# Patient Record
Sex: FEMALE | Race: WHITE | NOT HISPANIC OR LATINO | Employment: OTHER | URBAN - NONMETROPOLITAN AREA
[De-identification: names, ages, dates, MRNs, and addresses within clinical notes are randomized per-mention and may not be internally consistent; named-entity substitution may affect disease eponyms.]

---

## 2017-02-09 DIAGNOSIS — R11.0 NAUSEA: ICD-10-CM

## 2017-02-09 RX ORDER — PROMETHAZINE HYDROCHLORIDE 25 MG/1
25 TABLET ORAL EVERY 6 HOURS PRN
Qty: 30 TABLET | Refills: 1 | Status: CANCELLED | OUTPATIENT
Start: 2017-02-09

## 2017-02-09 RX ORDER — HYDROCODONE BITARTRATE AND ACETAMINOPHEN 7.5; 325 MG/1; MG/1
1 TABLET ORAL EVERY 6 HOURS PRN
Qty: 100 TABLET | Refills: 0 | Status: CANCELLED | OUTPATIENT
Start: 2017-02-09

## 2017-02-09 RX ORDER — PROMETHAZINE HYDROCHLORIDE 25 MG/1
25 TABLET ORAL EVERY 6 HOURS PRN
Qty: 30 TABLET | Refills: 1 | Status: SHIPPED | OUTPATIENT
Start: 2017-02-09 | End: 2017-02-21 | Stop reason: SDUPTHER

## 2017-02-21 ENCOUNTER — OFFICE VISIT (OUTPATIENT)
Dept: FAMILY MEDICINE CLINIC | Facility: CLINIC | Age: 57
End: 2017-02-21

## 2017-02-21 VITALS
SYSTOLIC BLOOD PRESSURE: 152 MMHG | HEIGHT: 60 IN | BODY MASS INDEX: 50.06 KG/M2 | WEIGHT: 255 LBS | DIASTOLIC BLOOD PRESSURE: 82 MMHG

## 2017-02-21 DIAGNOSIS — R11.0 NAUSEA: ICD-10-CM

## 2017-02-21 DIAGNOSIS — M19.072 ARTHRITIS OF LEFT ANKLE: Primary | ICD-10-CM

## 2017-02-21 PROCEDURE — 99214 OFFICE O/P EST MOD 30 MIN: CPT | Performed by: FAMILY MEDICINE

## 2017-02-21 RX ORDER — HYDROCODONE BITARTRATE AND ACETAMINOPHEN 7.5; 325 MG/1; MG/1
1 TABLET ORAL EVERY 6 HOURS PRN
Qty: 100 TABLET | Refills: 0 | Status: SHIPPED | OUTPATIENT
Start: 2017-02-21 | End: 2017-02-21 | Stop reason: SDUPTHER

## 2017-02-21 RX ORDER — HYDROCODONE BITARTRATE AND ACETAMINOPHEN 7.5; 325 MG/1; MG/1
1 TABLET ORAL EVERY 6 HOURS PRN
Qty: 100 TABLET | Refills: 0 | Status: SHIPPED | OUTPATIENT
Start: 2017-02-21 | End: 2017-04-12 | Stop reason: SDUPTHER

## 2017-02-21 RX ORDER — PROMETHAZINE HYDROCHLORIDE 25 MG/1
25 TABLET ORAL EVERY 6 HOURS PRN
Qty: 30 TABLET | Refills: 1 | Status: SHIPPED | OUTPATIENT
Start: 2017-02-21 | End: 2017-04-12 | Stop reason: SDUPTHER

## 2017-02-21 NOTE — PROGRESS NOTES
Subjective   Prema Curtis is a 57 y.o. female.  Patient is slightly mentally impaired and works at the [employer].  She's been having increasing times with pain in the joints including shoulders back and knees.  Most recently she is having increasing pain and stiffness in the left ankle which is maybe difficult for her to stand and walk.    History of Present Illness   Arthritis   Presents for follow-up visit. She complains of pain, stiffness and joint swelling. She reports no joint warmth. The symptoms have been worsening. Affected location knees, left ankle, shoulders. Her pain is at a severity of 8/10. Associated symptoms include fatigue, pain at night and pain while resting. Pertinent negatives include no diarrhea, dry eyes, dry mouth, dysuria, fever, rash, Raynaud's syndrome, uveitis or weight loss. Compliance with total regimen is %. Compliance with medications is %.       The following portions of the patient's history were reviewed and updated as appropriate: allergies, current medications, past family history, past medical history, past social history, past surgical history and problem list.    Review of Systems   Constitutional: Negative.    HENT: Negative.    Respiratory: Negative.  Negative for shortness of breath.    Cardiovascular: Negative.  Negative for chest pain.   Gastrointestinal: Negative.    Musculoskeletal: Positive for arthralgias, back pain, gait problem, joint swelling, myalgias and neck pain.   Skin: Negative.    Allergic/Immunologic: Negative for immunocompromised state.   Neurological: Negative for dizziness, tremors, seizures, syncope, weakness and numbness.   Hematological: Negative.    Psychiatric/Behavioral: Negative for agitation, confusion, dysphoric mood and sleep disturbance. The patient is nervous/anxious.        Objective   Physical Exam   Constitutional: She is oriented to person, place, and time. She appears well-developed and well-nourished.    HENT:   Head: Normocephalic and atraumatic.   Nose: Nose normal.   Mouth/Throat: Oropharynx is clear and moist.   Eyes: Conjunctivae and EOM are normal. Pupils are equal, round, and reactive to light.   Neck: Normal range of motion. Neck supple. No JVD present. No tracheal deviation present. No thyromegaly present.   Cardiovascular: Normal rate, regular rhythm, normal heart sounds and intact distal pulses.    No murmur heard.  Pulmonary/Chest: Effort normal and breath sounds normal. She has no wheezes.   Abdominal: Soft. Bowel sounds are normal. She exhibits no distension. There is no tenderness.   Musculoskeletal: She exhibits tenderness. She exhibits no edema.   Swelling and tenderness over the left ankle lateral malleolus.  Tenderness with crepitus noted in both knees anteriorly.  Low back tenderness, bilateral shoulder joint tenderness and decreased range of motion   Lymphadenopathy:     She has no cervical adenopathy.   Neurological: She is alert and oriented to person, place, and time. She displays normal reflexes. Coordination abnormal.   Skin: Skin is warm and dry. No rash noted.   Psychiatric: She has a normal mood and affect.   Nursing note and vitals reviewed.      Assessment/Plan   Prema was seen today for ankle pain.    Diagnoses and all orders for this visit:    Arthritis of left ankle    Nausea  -     promethazine (PHENERGAN) 25 MG tablet; Take 1 tablet by mouth Every 6 (Six) Hours As Needed for nausea.  -     XR Ankle 3+ View Left    Other orders  -     Discontinue: HYDROcodone-acetaminophen (NORCO) 7.5-325 MG per tablet; Take 1 tablet by mouth Every 6 (Six) Hours As Needed for moderate pain (4-6).  -     HYDROcodone-acetaminophen (NORCO) 7.5-325 MG per tablet; Take 1 tablet by mouth Every 6 (Six) Hours As Needed for moderate pain (4-6).    The patient has read and signed the [facility] Controlled Substance Contract.  I will continue to see patient for regular follow up appointments. Patient is  well controlled on the medication.  SHAYLA has been reviewed by me and is updated every 3 months. The patient is aware of the potential for addiction and dependence.  X-ray shows arthritis, bone spur heel spur.  Discussed with the patient today whether or not she may be able to continue working.  I've recommended that she consider a job where she can set if she has to continue working as a think the continuing arthritis pain will progress with exertion.    Continue with hydrocodone when needed for pain.  Prescribed an AFO for the left ankle, consider physical therapy if not improving

## 2017-03-21 ENCOUNTER — OFFICE VISIT (OUTPATIENT)
Dept: FAMILY MEDICINE CLINIC | Facility: CLINIC | Age: 57
End: 2017-03-21

## 2017-03-21 VITALS
WEIGHT: 250.2 LBS | DIASTOLIC BLOOD PRESSURE: 82 MMHG | HEIGHT: 60 IN | BODY MASS INDEX: 49.12 KG/M2 | SYSTOLIC BLOOD PRESSURE: 140 MMHG

## 2017-03-21 DIAGNOSIS — E55.9 VITAMIN D DEFICIENCY: ICD-10-CM

## 2017-03-21 DIAGNOSIS — I10 ESSENTIAL HYPERTENSION: Primary | ICD-10-CM

## 2017-03-21 DIAGNOSIS — M25.561 CHRONIC PAIN OF RIGHT KNEE: ICD-10-CM

## 2017-03-21 DIAGNOSIS — G89.29 CHRONIC PAIN OF RIGHT KNEE: ICD-10-CM

## 2017-03-21 DIAGNOSIS — E66.01 MORBID OBESITY, UNSPECIFIED OBESITY TYPE (HCC): ICD-10-CM

## 2017-03-21 PROCEDURE — 99214 OFFICE O/P EST MOD 30 MIN: CPT | Performed by: FAMILY MEDICINE

## 2017-03-21 NOTE — PROGRESS NOTES
Subjective   Prema Curtis is a 57 y.o. female.  She's here today to follow-up on weight issues as well as arthritis pretty having increasing pain in the right knee both at rest and with movement or walking.  She has been able to set more at work which is septic the pressure off her back in the office helped a bit    She is very proud that she's lost 5 pounds.  We discussed diet and exercise when tolerated.  Hopefully with weight loss she'll be able to tolerate more exercise    History of Present Illness   Obesity   This is a chronic problem. The current episode started more than 1 year ago. The problem occurs constantly. The problem has been gradually worsening. Associated symptoms include arthralgias and fatigue. Pertinent negatives include no abdominal pain, anorexia, change in bowel habit, chest pain, chills, congestion, coughing, diaphoresis, fever, headaches, joint swelling, myalgias, nausea, neck pain, numbness, rash, sore throat, swollen glands, urinary symptoms, vertigo, visual change, vomiting or weakness. The symptoms are aggravated by eating. Treatments tried: diet, exercise. The treatment provided mild relief.     The following portions of the patient's history were reviewed and updated as appropriate: allergies, current medications, past family history, past medical history, past social history, past surgical history and problem list.    Review of Systems   Constitutional: Negative.    HENT: Negative.    Respiratory: Negative.  Negative for shortness of breath.    Cardiovascular: Negative.  Negative for chest pain.   Gastrointestinal: Negative.    Musculoskeletal: Negative.  Negative for myalgias.   Skin: Negative.    Allergic/Immunologic: Negative for immunocompromised state.   Neurological: Negative for dizziness, tremors, seizures, syncope, weakness and numbness.   Hematological: Negative.    Psychiatric/Behavioral: Negative for agitation, confusion, dysphoric mood and sleep disturbance. The  patient is not nervous/anxious.        Objective   Physical Exam   Constitutional: She is oriented to person, place, and time. She appears well-developed and well-nourished.   HENT:   Head: Normocephalic and atraumatic.   Nose: Nose normal.   Mouth/Throat: Oropharynx is clear and moist.   Eyes: Conjunctivae and EOM are normal. Pupils are equal, round, and reactive to light.   Neck: Normal range of motion. Neck supple. No JVD present. No tracheal deviation present. No thyromegaly present.   Cardiovascular: Normal rate, regular rhythm, normal heart sounds and intact distal pulses.    No murmur heard.  Pulmonary/Chest: Effort normal and breath sounds normal. She has no wheezes.   Abdominal: Soft. Bowel sounds are normal. She exhibits no distension. There is no tenderness.   Musculoskeletal: She exhibits tenderness. She exhibits no edema.   Pain and stiffness in the right knee especially in the anterior lateral area   Lymphadenopathy:     She has no cervical adenopathy.   Neurological: She is alert and oriented to person, place, and time. Coordination normal.   Skin: Skin is warm and dry. No rash noted.   Psychiatric: She has a normal mood and affect.   Nursing note and vitals reviewed.      Assessment/Plan   Prema was seen today for follow-up.    Diagnoses and all orders for this visit:    Essential hypertension  -     CBC & Differential  -     Comprehensive Metabolic Panel  -     Lipid Panel  -     Vitamin B12 & Folate  -     T4, Free  -     TSH    Vitamin D deficiency  -     Vitamin D 25 Hydroxy    Chronic pain of right knee  -     XR Knee 3 View Right    Morbid obesity, unspecified obesity type     she is due for some labs and certainly will assess thyroid due to the difficulty she is having with weight loss    Recheck vitamin D as she has a history of deficiency    We'll x-ray the right knee and follow-up accordingly.    Continue with diet and exercise changes and she is commended for her weight loss

## 2017-03-22 LAB
ALBUMIN SERPL-MCNC: 3.9 G/DL (ref 3.2–5.5)
ALBUMIN/GLOB SERPL: 1.1 G/DL (ref 1–3)
ALP SERPL-CCNC: 74 U/L (ref 15–121)
ALT SERPL W P-5'-P-CCNC: 26 U/L (ref 10–60)
ANION GAP SERPL CALCULATED.3IONS-SCNC: 10 MMOL/L (ref 5–15)
AST SERPL-CCNC: 27 U/L (ref 10–60)
BASOPHILS # BLD AUTO: 0.03 10*3/MM3 (ref 0–0.2)
BASOPHILS NFR BLD AUTO: 0.4 % (ref 0–2)
BILIRUB SERPL-MCNC: 0.7 MG/DL (ref 0.2–1)
BUN BLD-MCNC: 14 MG/DL (ref 8–25)
BUN/CREAT SERPL: 20 (ref 7–25)
CALCIUM SPEC-SCNC: 9.4 MG/DL (ref 8.4–10.8)
CHLORIDE SERPL-SCNC: 102 MMOL/L (ref 100–112)
CHOLEST SERPL-MCNC: 234 MG/DL (ref 150–200)
CO2 SERPL-SCNC: 25 MMOL/L (ref 20–32)
CREAT BLD-MCNC: 0.7 MG/DL (ref 0.4–1.3)
DEPRECATED RDW RBC AUTO: 43.3 FL (ref 36.4–46.3)
EOSINOPHIL # BLD AUTO: 0.28 10*3/MM3 (ref 0–0.7)
EOSINOPHIL NFR BLD AUTO: 3.9 % (ref 0–7)
ERYTHROCYTE [DISTWIDTH] IN BLOOD BY AUTOMATED COUNT: 13.6 % (ref 11.5–14.5)
GFR SERPL CREATININE-BSD FRML MDRD: 86 ML/MIN/1.73 (ref 51–120)
GLOBULIN UR ELPH-MCNC: 3.6 GM/DL (ref 2.5–4.6)
GLUCOSE BLD-MCNC: 161 MG/DL (ref 70–100)
HCT VFR BLD AUTO: 42.1 % (ref 35–45)
HDLC SERPL-MCNC: 44 MG/DL (ref 35–100)
HGB BLD-MCNC: 13.6 G/DL (ref 12–15.5)
LDLC SERPL CALC-MCNC: 155 MG/DL
LDLC/HDLC SERPL: 3.52 {RATIO}
LYMPHOCYTES # BLD AUTO: 1.96 10*3/MM3 (ref 0.6–4.2)
LYMPHOCYTES NFR BLD AUTO: 27.2 % (ref 10–50)
MCH RBC QN AUTO: 28.5 PG (ref 26.5–34)
MCHC RBC AUTO-ENTMCNC: 32.3 G/DL (ref 31.4–36)
MCV RBC AUTO: 88.1 FL (ref 80–98)
MONOCYTES # BLD AUTO: 0.68 10*3/MM3 (ref 0–0.9)
MONOCYTES NFR BLD AUTO: 9.4 % (ref 0–12)
NEUTROPHILS # BLD AUTO: 4.25 10*3/MM3 (ref 2–8.6)
NEUTROPHILS NFR BLD AUTO: 59.1 % (ref 37–80)
PLATELET # BLD AUTO: 330 10*3/MM3 (ref 150–450)
PMV BLD AUTO: 10.5 FL (ref 8–12)
POTASSIUM BLD-SCNC: 3.9 MMOL/L (ref 3.4–5.4)
PROT SERPL-MCNC: 7.5 G/DL (ref 6.7–8.2)
RBC # BLD AUTO: 4.78 10*6/MM3 (ref 3.77–5.16)
SODIUM BLD-SCNC: 137 MMOL/L (ref 134–146)
TRIGL SERPL-MCNC: 176 MG/DL (ref 35–160)
VLDLC SERPL-MCNC: 35.2 MG/DL
WBC NRBC COR # BLD: 7.2 10*3/MM3 (ref 3.2–9.8)

## 2017-03-22 PROCEDURE — 80061 LIPID PANEL: CPT | Performed by: FAMILY MEDICINE

## 2017-03-22 PROCEDURE — 82306 VITAMIN D 25 HYDROXY: CPT | Performed by: FAMILY MEDICINE

## 2017-03-22 PROCEDURE — 84443 ASSAY THYROID STIM HORMONE: CPT | Performed by: FAMILY MEDICINE

## 2017-03-22 PROCEDURE — 82746 ASSAY OF FOLIC ACID SERUM: CPT | Performed by: FAMILY MEDICINE

## 2017-03-22 PROCEDURE — 36415 COLL VENOUS BLD VENIPUNCTURE: CPT | Performed by: FAMILY MEDICINE

## 2017-03-22 PROCEDURE — 85025 COMPLETE CBC W/AUTO DIFF WBC: CPT | Performed by: FAMILY MEDICINE

## 2017-03-22 PROCEDURE — 84439 ASSAY OF FREE THYROXINE: CPT | Performed by: FAMILY MEDICINE

## 2017-03-22 PROCEDURE — 80053 COMPREHEN METABOLIC PANEL: CPT | Performed by: FAMILY MEDICINE

## 2017-03-22 PROCEDURE — 82607 VITAMIN B-12: CPT | Performed by: FAMILY MEDICINE

## 2017-03-22 NOTE — PROGRESS NOTES
Please call the patient regarding her abnormal result.  Tell her her cholesterol was high.  We can send her information about low cholesterol diet if she would like.  Her blood sugars also a bit high and we need to talk about changing her medications.  She supposed to come in in June but I'd like to see her in one month to check back about her sugar

## 2017-03-23 DIAGNOSIS — E03.9 HYPOTHYROIDISM, UNSPECIFIED TYPE: Primary | ICD-10-CM

## 2017-03-23 LAB
25(OH)D3 SERPL-MCNC: <12.8 NG/ML (ref 30–100)
FOLATE SERPL-MCNC: 5.84 NG/ML (ref 2.76–21)
T4 FREE SERPL-MCNC: 1.13 NG/DL (ref 0.78–2.19)
TSH SERPL DL<=0.05 MIU/L-ACNC: 5.15 MIU/ML (ref 0.46–4.68)
VIT B12 BLD-MCNC: 314 PG/ML (ref 239–931)

## 2017-03-23 RX ORDER — ERGOCALCIFEROL 1.25 MG/1
50000 CAPSULE ORAL 2 TIMES WEEKLY
Qty: 8 CAPSULE | Refills: 5 | Status: SHIPPED | OUTPATIENT
Start: 2017-03-23 | End: 2017-04-12 | Stop reason: SDUPTHER

## 2017-03-23 RX ORDER — LEVOTHYROXINE SODIUM 0.03 MG/1
25 TABLET ORAL DAILY
Qty: 30 TABLET | Refills: 5 | Status: SHIPPED | OUTPATIENT
Start: 2017-03-23 | End: 2019-06-20

## 2017-03-23 NOTE — PROGRESS NOTES
Please call the patient regarding her abnormal result.  Needs vitamin D 50,000 units twice weekly, send in for a year and the other labs are still pending

## 2017-03-23 NOTE — PROGRESS NOTES
Please call the patient regarding her abnormal result.  Her thyroid is underactive.  Send in synthroid 25 mcg daily.  Tell her this will give her energy, may help lose weight.  Recheck a TSH in 6 weeks.

## 2017-04-12 ENCOUNTER — OFFICE VISIT (OUTPATIENT)
Dept: FAMILY MEDICINE CLINIC | Facility: CLINIC | Age: 57
End: 2017-04-12

## 2017-04-12 VITALS
DIASTOLIC BLOOD PRESSURE: 78 MMHG | BODY MASS INDEX: 47.71 KG/M2 | HEIGHT: 60 IN | WEIGHT: 243 LBS | SYSTOLIC BLOOD PRESSURE: 134 MMHG

## 2017-04-12 DIAGNOSIS — R73.9 HYPERGLYCEMIA: ICD-10-CM

## 2017-04-12 DIAGNOSIS — E78.5 HYPERLIPIDEMIA, UNSPECIFIED HYPERLIPIDEMIA TYPE: ICD-10-CM

## 2017-04-12 DIAGNOSIS — R11.0 NAUSEA: ICD-10-CM

## 2017-04-12 DIAGNOSIS — I10 ESSENTIAL HYPERTENSION: ICD-10-CM

## 2017-04-12 DIAGNOSIS — E55.9 VITAMIN D DEFICIENCY: Primary | ICD-10-CM

## 2017-04-12 PROCEDURE — 99214 OFFICE O/P EST MOD 30 MIN: CPT | Performed by: FAMILY MEDICINE

## 2017-04-12 RX ORDER — HYDROCODONE BITARTRATE AND ACETAMINOPHEN 7.5; 325 MG/1; MG/1
1 TABLET ORAL EVERY 6 HOURS PRN
Qty: 100 TABLET | Refills: 0 | Status: SHIPPED | OUTPATIENT
Start: 2017-04-12 | End: 2017-06-20 | Stop reason: SDUPTHER

## 2017-04-12 RX ORDER — ERGOCALCIFEROL 1.25 MG/1
50000 CAPSULE ORAL 2 TIMES WEEKLY
Qty: 8 CAPSULE | Refills: 5 | Status: SHIPPED | OUTPATIENT
Start: 2017-04-13 | End: 2019-06-20

## 2017-04-12 RX ORDER — PROMETHAZINE HYDROCHLORIDE 25 MG/1
25 TABLET ORAL EVERY 6 HOURS PRN
Qty: 30 TABLET | Refills: 5 | Status: SHIPPED | OUTPATIENT
Start: 2017-04-12 | End: 2017-06-20 | Stop reason: SDUPTHER

## 2017-04-12 NOTE — PROGRESS NOTES
Subjective   Prema Curtis is a 57 y.o. female.  She had recent labs and his here today to review them.  Has a history of hypothyroidism, hypertension.    History of Present Illness     The following portions of the patient's history were reviewed and updated as appropriate: allergies, current medications, past family history, past medical history, past social history, past surgical history and problem list.    Review of Systems   Constitutional: Positive for fatigue.   HENT: Negative.    Respiratory: Negative.  Negative for shortness of breath.    Cardiovascular: Negative.  Negative for chest pain.   Gastrointestinal: Negative.    Musculoskeletal: Positive for arthralgias, gait problem, joint swelling and myalgias.   Skin: Negative.    Allergic/Immunologic: Negative for immunocompromised state.   Neurological: Negative for dizziness, tremors, seizures, syncope, weakness and numbness.   Hematological: Negative.    Psychiatric/Behavioral: Negative for agitation, confusion, dysphoric mood and sleep disturbance. The patient is nervous/anxious.        Objective   Physical Exam   Constitutional: She is oriented to person, place, and time. She appears well-developed and well-nourished.   HENT:   Head: Normocephalic and atraumatic.   Nose: Nose normal.   Mouth/Throat: Oropharynx is clear and moist.   Eyes: Conjunctivae and EOM are normal. Pupils are equal, round, and reactive to light.   Neck: Normal range of motion. Neck supple. No JVD present. No tracheal deviation present. No thyromegaly present.   Cardiovascular: Normal rate, regular rhythm, normal heart sounds and intact distal pulses.    No murmur heard.  Pulmonary/Chest: Effort normal and breath sounds normal. She has no wheezes.   Abdominal: Soft. Bowel sounds are normal. She exhibits no distension. There is no tenderness.   Musculoskeletal: She exhibits tenderness. She exhibits no edema.   Pain and stiffness in the right knee especially in the anterior  lateral area   Lymphadenopathy:     She has no cervical adenopathy.   Neurological: She is alert and oriented to person, place, and time. Coordination normal.   Skin: Skin is warm and dry. No rash noted.   Psychiatric: She has a normal mood and affect.   Nursing note and vitals reviewed.    Office Visit on 03/21/2017   Component Date Value Ref Range Status   • Glucose 03/22/2017 161* 70 - 100 mg/dL Final   • BUN 03/22/2017 14  8 - 25 mg/dL Final   • Creatinine 03/22/2017 0.70  0.40 - 1.30 mg/dL Final   • Sodium 03/22/2017 137  134 - 146 mmol/L Final   • Potassium 03/22/2017 3.9  3.4 - 5.4 mmol/L Final   • Chloride 03/22/2017 102  100 - 112 mmol/L Final   • CO2 03/22/2017 25.0  20.0 - 32.0 mmol/L Final   • Calcium 03/22/2017 9.4  8.4 - 10.8 mg/dL Final   • Total Protein 03/22/2017 7.5  6.7 - 8.2 g/dL Final   • Albumin 03/22/2017 3.90  3.20 - 5.50 g/dL Final   • ALT (SGPT) 03/22/2017 26  10 - 60 U/L Final   • AST (SGOT) 03/22/2017 27  10 - 60 U/L Final   • Alkaline Phosphatase 03/22/2017 74  15 - 121 U/L Final   • Total Bilirubin 03/22/2017 0.7  0.2 - 1.0 mg/dL Final   • eGFR Non African Amer 03/22/2017 86  51 - 120 mL/min/1.73 Final   • Globulin 03/22/2017 3.6  2.5 - 4.6 gm/dL Final   • A/G Ratio 03/22/2017 1.1  1.0 - 3.0 g/dL Final   • BUN/Creatinine Ratio 03/22/2017 20.0  7.0 - 25.0 Final   • Anion Gap 03/22/2017 10.0  5.0 - 15.0 mmol/L Final   • Total Cholesterol 03/22/2017 234* 150 - 200 mg/dL Final   • Triglycerides 03/22/2017 176* 35 - 160 mg/dL Final   • HDL Cholesterol 03/22/2017 44  35 - 100 mg/dL Final   • LDL Cholesterol  03/22/2017 155  mg/dL Final   • VLDL Cholesterol 03/22/2017 35.2  mg/dL Final   • LDL/HDL Ratio 03/22/2017 3.52   Final   • Folate 03/22/2017 5.84  2.76 - 21.00 ng/mL Final   • Vitamin B-12 03/22/2017 314  239 - 931 pg/mL Final   • 25 Hydroxy, Vitamin D 03/22/2017 <12.8* 30.0 - 100.0 ng/ml Final   • Free T4 03/22/2017 1.13  0.78 - 2.19 ng/dL Final   • TSH 03/22/2017 5.150* 0.460 - 4.680  mIU/mL Final   • WBC 03/22/2017 7.20  3.20 - 9.80 10*3/mm3 Final   • RBC 03/22/2017 4.78  3.77 - 5.16 10*6/mm3 Final   • Hemoglobin 03/22/2017 13.6  12.0 - 15.5 g/dL Final   • Hematocrit 03/22/2017 42.1  35.0 - 45.0 % Final   • MCV 03/22/2017 88.1  80.0 - 98.0 fL Final   • MCH 03/22/2017 28.5  26.5 - 34.0 pg Final   • MCHC 03/22/2017 32.3  31.4 - 36.0 g/dL Final   • RDW 03/22/2017 13.6  11.5 - 14.5 % Final   • RDW-SD 03/22/2017 43.3  36.4 - 46.3 fl Final   • MPV 03/22/2017 10.5  8.0 - 12.0 fL Final   • Platelets 03/22/2017 330  150 - 450 10*3/mm3 Final   • Neutrophil % 03/22/2017 59.1  37.0 - 80.0 % Final   • Lymphocyte % 03/22/2017 27.2  10.0 - 50.0 % Final   • Monocyte % 03/22/2017 9.4  0.0 - 12.0 % Final   • Eosinophil % 03/22/2017 3.9  0.0 - 7.0 % Final   • Basophil % 03/22/2017 0.4  0.0 - 2.0 % Final   • Neutrophils, Absolute 03/22/2017 4.25  2.00 - 8.60 10*3/mm3 Final   • Lymphocytes, Absolute 03/22/2017 1.96  0.60 - 4.20 10*3/mm3 Final   • Monocytes, Absolute 03/22/2017 0.68  0.00 - 0.90 10*3/mm3 Final   • Eosinophils, Absolute 03/22/2017 0.28  0.00 - 0.70 10*3/mm3 Final   • Basophils, Absolute 03/22/2017 0.03  0.00 - 0.20 10*3/mm3 Final   ]    Assessment/Plan   Prema was seen today for labs only.    Diagnoses and all orders for this visit:    Vitamin D deficiency    Nausea  -     promethazine (PHENERGAN) 25 MG tablet; Take 1 tablet by mouth Every 6 (Six) Hours As Needed for Nausea.    Essential hypertension    Hyperlipidemia, unspecified hyperlipidemia type    Hyperglycemia    Other orders  -     HYDROcodone-acetaminophen (NORCO) 7.5-325 MG per tablet; Take 1 tablet by mouth Every 6 (Six) Hours As Needed for Moderate Pain (4-6).  -     vitamin D (ERGOCALCIFEROL) 05956 UNITS capsule capsule; Take 1 capsule by mouth 2 (Two) Times a Week.     will add vitamin D supplementation due to deficiency    Will have her check blood sugars and follow up in 3 months    Continue with current dose of Synthroid    Diet and  lifestyle changes encouraged for lipids, recheck in 6 months, consider statin therapy if not improving.

## 2017-04-18 RX ORDER — DEXTROMETHORPHAN HYDROBROMIDE AND PROMETHAZINE HYDROCHLORIDE 15; 6.25 MG/5ML; MG/5ML
5 SYRUP ORAL 4 TIMES DAILY PRN
Qty: 473 ML | Refills: 0 | Status: SHIPPED | OUTPATIENT
Start: 2017-04-18 | End: 2018-12-20

## 2017-06-08 DIAGNOSIS — Z12.31 ENCOUNTER FOR SCREENING MAMMOGRAM FOR MALIGNANT NEOPLASM OF BREAST: Primary | ICD-10-CM

## 2017-06-20 ENCOUNTER — OFFICE VISIT (OUTPATIENT)
Dept: FAMILY MEDICINE CLINIC | Facility: CLINIC | Age: 57
End: 2017-06-20

## 2017-06-20 VITALS
WEIGHT: 236 LBS | DIASTOLIC BLOOD PRESSURE: 78 MMHG | HEIGHT: 60 IN | SYSTOLIC BLOOD PRESSURE: 124 MMHG | BODY MASS INDEX: 46.33 KG/M2

## 2017-06-20 DIAGNOSIS — M25.50 MULTIPLE JOINT PAIN: Primary | ICD-10-CM

## 2017-06-20 DIAGNOSIS — R11.0 NAUSEA: ICD-10-CM

## 2017-06-20 PROCEDURE — 99213 OFFICE O/P EST LOW 20 MIN: CPT | Performed by: FAMILY MEDICINE

## 2017-06-20 RX ORDER — HYDROCODONE BITARTRATE AND ACETAMINOPHEN 7.5; 325 MG/1; MG/1
1 TABLET ORAL EVERY 6 HOURS PRN
Qty: 100 TABLET | Refills: 0 | Status: SHIPPED | OUTPATIENT
Start: 2017-06-20 | End: 2017-09-20 | Stop reason: SDUPTHER

## 2017-06-20 RX ORDER — PROMETHAZINE HYDROCHLORIDE 25 MG/1
25 TABLET ORAL EVERY 6 HOURS PRN
Qty: 30 TABLET | Refills: 5 | Status: SHIPPED | OUTPATIENT
Start: 2017-06-20 | End: 2017-12-20 | Stop reason: SDUPTHER

## 2017-06-20 NOTE — PROGRESS NOTES
Subjective   Prema Curtis is a 57 y.o. female.  She's here today to follow-up on chronic pain issues including back pain shoulder pain and knee pain.  Her job physically taxing on her and she finds it difficult to stand all day due to the pain.  She needs a refill of her pain medications.  Also, she has lost a significant amount of weight, up almost 20 pounds.  She's been doing this just with diet and exercise.    Back Pain   This is a new problem. The current episode started more than 1 month ago. The problem occurs constantly. The problem is unchanged. The pain is present in the gluteal and lumbar spine. The quality of the pain is described as aching, shooting, stabbing and burning. The pain radiates to the right knee and left knee. The pain is at a severity of 8/10. The pain is severe. The pain is worse during the day. The symptoms are aggravated by position, bending, standing and twisting. Stiffness is present at night, in the morning and all day. Pertinent negatives include no chest pain, numbness or weakness. Risk factors include obesity, lack of exercise, poor posture and sedentary lifestyle. She has tried analgesics and NSAIDs for the symptoms. The treatment provided mild relief.        The following portions of the patient's history were reviewed and updated as appropriate: allergies, current medications, past family history, past medical history, past social history, past surgical history and problem list.    Review of Systems   Constitutional: Negative.    HENT: Negative.    Respiratory: Negative.  Negative for shortness of breath.    Cardiovascular: Negative.  Negative for chest pain.   Gastrointestinal: Negative.    Musculoskeletal: Positive for back pain. Negative for myalgias.   Skin: Negative.    Allergic/Immunologic: Negative for immunocompromised state.   Neurological: Negative for dizziness, tremors, seizures, syncope, weakness and numbness.   Hematological: Negative.     Psychiatric/Behavioral: Negative for agitation, confusion, dysphoric mood and sleep disturbance. The patient is not nervous/anxious.        Objective   Physical Exam   Constitutional: She is oriented to person, place, and time. She appears well-developed and well-nourished.   HENT:   Head: Normocephalic and atraumatic.   Nose: Nose normal.   Mouth/Throat: Oropharynx is clear and moist.   Eyes: Conjunctivae and EOM are normal. Pupils are equal, round, and reactive to light.   Neck: Normal range of motion. Neck supple. No JVD present. No tracheal deviation present. No thyromegaly present.   Cardiovascular: Normal rate, regular rhythm, normal heart sounds and intact distal pulses.    No murmur heard.  Pulmonary/Chest: Effort normal and breath sounds normal. She has no wheezes.   Abdominal: Soft. Bowel sounds are normal. She exhibits no distension. There is no tenderness.   Musculoskeletal: Normal range of motion. She exhibits no edema.   Significant crepitus right anterior knee under the scar    Tenderness in the low back and over the SI joint areas   Lymphadenopathy:     She has no cervical adenopathy.   Neurological: She is alert and oriented to person, place, and time. She displays normal reflexes. Coordination normal.   Skin: Skin is warm and dry. No rash noted.   Psychiatric: She has a normal mood and affect.   Nursing note and vitals reviewed.      Assessment/Plan   Prema was seen today for med refill.    Diagnoses and all orders for this visit:    Multiple joint pain    Nausea  -     promethazine (PHENERGAN) 25 MG tablet; Take 1 tablet by mouth Every 6 (Six) Hours As Needed for Nausea.    Other orders  -     HYDROcodone-acetaminophen (NORCO) 7.5-325 MG per tablet; Take 1 tablet by mouth Every 6 (Six) Hours As Needed for Moderate Pain (4-6).      Refilled hydrocodone to use as needed for the knee and back pain and will get an x-ray of the back and follow up accordingly.  The patient at this point is not willing  to get anything different onto the knee if it worsens she will let me know    Refilled Phenergan to use when needed.

## 2017-06-22 ENCOUNTER — PROCEDURE VISIT (OUTPATIENT)
Dept: FAMILY MEDICINE CLINIC | Facility: CLINIC | Age: 57
End: 2017-06-22

## 2017-06-22 VITALS
BODY MASS INDEX: 46.33 KG/M2 | WEIGHT: 236 LBS | DIASTOLIC BLOOD PRESSURE: 70 MMHG | HEIGHT: 60 IN | SYSTOLIC BLOOD PRESSURE: 122 MMHG

## 2017-06-22 DIAGNOSIS — Z01.419 WOMEN'S ANNUAL ROUTINE GYNECOLOGICAL EXAMINATION: ICD-10-CM

## 2017-06-22 DIAGNOSIS — Z01.419 PAP TEST, AS PART OF ROUTINE GYNECOLOGICAL EXAMINATION: Primary | ICD-10-CM

## 2017-06-22 PROCEDURE — G0101 CA SCREEN;PELVIC/BREAST EXAM: HCPCS | Performed by: FAMILY MEDICINE

## 2017-06-22 PROCEDURE — G0123 SCREEN CERV/VAG THIN LAYER: HCPCS | Performed by: PATHOLOGY

## 2017-06-22 NOTE — PROGRESS NOTES
"Subjective   Chief Complaint   Patient presents with   • Gynecologic Exam     Prema Curtis is a 57 y.o. year old No obstetric history on file. presenting to be seen for her annual exam.  She is  0 para 0    She is not sexually active.  In the past 12 months there has not been new sexual partners.     She exercises regularly: yes.  She wears her seat belt:yes.  She has concerns about domestic violence: no.  She is taking Vit D and Calcium:no      LMP:several years    The following portions of the patient's history were reviewed and updated as appropriate:problem list, current medications, allergies, past family history, past medical history, past social history and past surgical history.    Smoking status: Never Smoker                                                              Smokeless status: Never Used                        Review of Systems      Objective   /70  Ht 60\" (152.4 cm)  Wt 236 lb (107 kg)  BMI 46.09 kg/m2    General:  well developed; well nourished  no acute distress   Skin:  No suspicious lesions seen   Thyroid: normal to inspection and palpation   Breasts:  Examined in supine position  Symmetric without masses or skin dimpling  Nipples normal without inversion, lesions or discharge  There are no palpable axillary nodes   Abdomen: soft, non-tender; no masses  no umbilical or inginual hernias are present  no hepato-splenomegaly   Psych: alert,oriented, in NAD with a full range of affect, normal behavior and no psychotic features   Pelvis: Clinical staff was present for exam  External genitalia:  normal appearance of the external genitalia including Bartholin's and [place]'s glands.  :  urethral meatus normal; urethral hypermobility is absent.  Vaginal:  normal pink mucosa without prolapse or lesions.  Cervix:  normal appearance.  Uterus:  anteverted;  Adnexa:  non palpable bilaterally.   She has a mild bladder prolapse and urethral prolapse noted.  She is having no " symptoms.    Lab Review   No data reviewed    Imaging  No data reviewed       Assessment   1. Annual gynecologic exam     Plan   1. Patient reports that she has never been sexually active and has no plans to be.  If this Pap is normal she will not likely need another but will continue with pelvic exams.  She is scheduled for her mammogram next month    No orders of the defined types were placed in this encounter.         This note was electronically signed.    Olga Rojas MD  June 22, 2017

## 2017-06-29 LAB
LAB AP CASE REPORT: NORMAL
LAB AP GYN ADDITIONAL INFORMATION: NORMAL
LAB AP GYN OTHER FINDINGS: NORMAL
Lab: NORMAL
PATH INTERP SPEC-IMP: NORMAL
STAT OF ADQ CVX/VAG CYTO-IMP: NORMAL

## 2017-08-28 DIAGNOSIS — M25.561 CHRONIC PAIN OF RIGHT KNEE: Primary | ICD-10-CM

## 2017-08-28 DIAGNOSIS — G89.29 CHRONIC PAIN OF RIGHT KNEE: Primary | ICD-10-CM

## 2017-08-29 ENCOUNTER — OFFICE VISIT (OUTPATIENT)
Dept: ORTHOPEDIC SURGERY | Facility: CLINIC | Age: 57
End: 2017-08-29

## 2017-08-29 VITALS — WEIGHT: 239 LBS | HEIGHT: 60 IN | BODY MASS INDEX: 46.92 KG/M2

## 2017-08-29 DIAGNOSIS — Z96.651 PRESENCE OF RIGHT ARTIFICIAL KNEE JOINT: ICD-10-CM

## 2017-08-29 DIAGNOSIS — M25.561 ACUTE PAIN OF RIGHT KNEE: Primary | ICD-10-CM

## 2017-08-29 DIAGNOSIS — I10 ESSENTIAL HYPERTENSION: ICD-10-CM

## 2017-08-29 PROCEDURE — 99214 OFFICE O/P EST MOD 30 MIN: CPT | Performed by: ORTHOPAEDIC SURGERY

## 2017-09-20 ENCOUNTER — OFFICE VISIT (OUTPATIENT)
Dept: FAMILY MEDICINE CLINIC | Facility: CLINIC | Age: 57
End: 2017-09-20

## 2017-09-20 VITALS
BODY MASS INDEX: 46.13 KG/M2 | HEIGHT: 60 IN | WEIGHT: 235 LBS | SYSTOLIC BLOOD PRESSURE: 124 MMHG | TEMPERATURE: 98 F | HEART RATE: 84 BPM | DIASTOLIC BLOOD PRESSURE: 74 MMHG

## 2017-09-20 DIAGNOSIS — M25.561 CHRONIC PAIN OF RIGHT KNEE: ICD-10-CM

## 2017-09-20 DIAGNOSIS — G89.29 CHRONIC PAIN OF RIGHT KNEE: ICD-10-CM

## 2017-09-20 DIAGNOSIS — M54.50 BILATERAL LOW BACK PAIN WITHOUT SCIATICA, UNSPECIFIED CHRONICITY: Primary | ICD-10-CM

## 2017-09-20 PROCEDURE — 99213 OFFICE O/P EST LOW 20 MIN: CPT | Performed by: FAMILY MEDICINE

## 2017-09-20 RX ORDER — HYDROCODONE BITARTRATE AND ACETAMINOPHEN 7.5; 325 MG/1; MG/1
1 TABLET ORAL EVERY 6 HOURS PRN
Qty: 100 TABLET | Refills: 0 | Status: SHIPPED | OUTPATIENT
Start: 2017-09-20 | End: 2017-12-20 | Stop reason: SDUPTHER

## 2017-09-20 NOTE — PROGRESS NOTES
"Subjective   Prmea Curtis is a 57 y.o. female.  She's here today to follow-up on chronic pain issues, especially knee pain. She fell 2-3 weeks ago and had x-rays, which were negative.  She had a \"deep bruise\" and it is still painful to walk or stand for a long time.  Continues to have chronic back pain issues.    Back Pain   This is a new problem. The current episode started more than 1 month ago. The problem occurs constantly. The problem is unchanged. The pain is present in the gluteal and lumbar spine. The quality of the pain is described as aching, shooting, stabbing and burning. The pain radiates to the right knee and left knee. The pain is at a severity of 8/10. The pain is severe. The pain is worse during the day. The symptoms are aggravated by position, bending, standing and twisting. Stiffness is present at night, in the morning and all day. Pertinent negatives include no chest pain, numbness or weakness. Risk factors include obesity, lack of exercise, poor posture and sedentary lifestyle. She has tried analgesics and NSAIDs for the symptoms. The treatment provided mild relief.        The following portions of the patient's history were reviewed and updated as appropriate: allergies, current medications, past family history, past medical history, past social history, past surgical history and problem list.    Review of Systems   Constitutional: Negative.    HENT: Negative.    Respiratory: Negative.  Negative for shortness of breath.    Cardiovascular: Negative.  Negative for chest pain.   Gastrointestinal: Negative.    Musculoskeletal: Positive for back pain. Negative for myalgias.   Skin: Negative.    Allergic/Immunologic: Negative for immunocompromised state.   Neurological: Negative for dizziness, tremors, seizures, syncope, weakness and numbness.   Hematological: Negative.    Psychiatric/Behavioral: Negative for agitation, confusion, dysphoric mood and sleep disturbance. The patient is not " nervous/anxious.      Objective   Physical Exam   Constitutional: She is oriented to person, place, and time. She appears well-developed and well-nourished.   HENT:   Head: Normocephalic and atraumatic.   Nose: Nose normal.   Mouth/Throat: Oropharynx is clear and moist.   Eyes: Conjunctivae and EOM are normal. Pupils are equal, round, and reactive to light.   Neck: Normal range of motion. Neck supple. No JVD present. No tracheal deviation present. No thyromegaly present.   Cardiovascular: Normal rate, regular rhythm, normal heart sounds and intact distal pulses.    No murmur heard.  Pulmonary/Chest: Effort normal and breath sounds normal. She has no wheezes.   Abdominal: Soft. Bowel sounds are normal. She exhibits no distension. There is no tenderness.   Musculoskeletal: Normal range of motion. She exhibits no edema.   Significant crepitus right anterior knee under the scar    Tenderness in the low back and over the SI joint areas   Lymphadenopathy:     She has no cervical adenopathy.   Neurological: She is alert and oriented to person, place, and time. She displays normal reflexes. Coordination normal.   Skin: Skin is warm and dry. No rash noted.   Psychiatric: She has a normal mood and affect.   Nursing note and vitals reviewed.    Assessment/Plan   Prema was seen today for follow-up.    Diagnoses and all orders for this visit:    Bilateral low back pain without sciatica, unspecified chronicity    Chronic pain of right knee    Other orders  -     HYDROcodone-acetaminophen (NORCO) 7.5-325 MG per tablet; Take 1 tablet by mouth Every 6 (Six) Hours As Needed for Moderate Pain .      Refilled hydrocodone to use as needed for the knee and back pain.    The patient has read and signed the [facility] Controlled Substance Contract.  I will continue to see patient for regular follow up appointments. Patient is well controlled on the medication.  SHAYLA has been reviewed by me and is updated every 3 months. The patient  is aware of the potential for addiction and dependence.   Gave a noted her request for her to work no more than 3 days per week for the next month.

## 2017-12-20 ENCOUNTER — OFFICE VISIT (OUTPATIENT)
Dept: FAMILY MEDICINE CLINIC | Facility: CLINIC | Age: 57
End: 2017-12-20

## 2017-12-20 VITALS
BODY MASS INDEX: 46.13 KG/M2 | WEIGHT: 235 LBS | HEIGHT: 60 IN | DIASTOLIC BLOOD PRESSURE: 80 MMHG | SYSTOLIC BLOOD PRESSURE: 156 MMHG

## 2017-12-20 DIAGNOSIS — J30.89 CHRONIC NONSEASONAL ALLERGIC RHINITIS DUE TO OTHER ALLERGEN: ICD-10-CM

## 2017-12-20 DIAGNOSIS — I10 ESSENTIAL HYPERTENSION: ICD-10-CM

## 2017-12-20 DIAGNOSIS — M25.50 MULTIPLE JOINT PAIN: Primary | ICD-10-CM

## 2017-12-20 DIAGNOSIS — R11.0 NAUSEA: ICD-10-CM

## 2017-12-20 PROCEDURE — 99214 OFFICE O/P EST MOD 30 MIN: CPT | Performed by: FAMILY MEDICINE

## 2017-12-20 RX ORDER — PROMETHAZINE HYDROCHLORIDE 25 MG/1
25 TABLET ORAL EVERY 6 HOURS PRN
Qty: 30 TABLET | Refills: 5 | Status: SHIPPED | OUTPATIENT
Start: 2017-12-20 | End: 2018-03-14 | Stop reason: SDUPTHER

## 2017-12-20 RX ORDER — HYDROCODONE BITARTRATE AND ACETAMINOPHEN 7.5; 325 MG/1; MG/1
1 TABLET ORAL EVERY 6 HOURS PRN
Qty: 100 TABLET | Refills: 0 | Status: SHIPPED | OUTPATIENT
Start: 2017-12-20 | End: 2018-03-12 | Stop reason: SDUPTHER

## 2017-12-20 RX ORDER — MONTELUKAST SODIUM 10 MG/1
TABLET ORAL
Qty: 30 TABLET | Refills: 5 | Status: SHIPPED | OUTPATIENT
Start: 2017-12-20 | End: 2022-06-09

## 2017-12-20 NOTE — PROGRESS NOTES
Subjective   Prema Curtis is a 57 y.o. female.  She's here today to follow-up on chronic pain issues, especially knee pain.  She needs a refill of her pain medication.  She is taking it only when needed, as instructed.    She has a history of some allergic rhinitis symptoms.  She had specific allergy testing which was negative so she went off all of her allergy medicine and now is having rhinorrhea, sneezing and congestion.      Back Pain   This is a new problem. The current episode started more than 1 month ago. The problem occurs constantly. The problem is unchanged. The pain is present in the gluteal and lumbar spine. The quality of the pain is described as aching, shooting, stabbing and burning. The pain radiates to the right knee and left knee. The pain is at a severity of 8/10. The pain is severe. The pain is worse during the day. The symptoms are aggravated by position, bending, standing and twisting. Stiffness is present at night, in the morning and all day. Pertinent negatives include no chest pain, numbness or weakness. Risk factors include obesity, lack of exercise, poor posture and sedentary lifestyle. She has tried analgesics and NSAIDs for the symptoms. The treatment provided mild relief.      The following portions of the patient's history were reviewed and updated as appropriate: allergies, current medications, past family history, past medical history, past social history, past surgical history and problem list.    Review of Systems   Constitutional: Negative.    HENT: Positive for congestion, postnasal drip, rhinorrhea, sinus pain and sneezing.    Respiratory: Positive for cough. Negative for shortness of breath.    Cardiovascular: Negative.  Negative for chest pain.   Gastrointestinal: Negative.    Genitourinary: Positive for frequency.   Musculoskeletal: Positive for arthralgias, back pain, joint swelling and myalgias.   Skin: Negative.    Allergic/Immunologic: Negative for  immunocompromised state.   Neurological: Negative for dizziness, tremors, seizures, syncope, weakness and numbness.   Hematological: Negative.    Psychiatric/Behavioral: Negative for agitation, confusion, dysphoric mood and sleep disturbance. The patient is not nervous/anxious.      Objective   Physical Exam   Constitutional: She is oriented to person, place, and time. She appears well-developed and well-nourished.   HENT:   Head: Normocephalic and atraumatic.   Nose: Nose normal.   Mouth/Throat: Oropharynx is clear and moist.   Eyes: Conjunctivae and EOM are normal. Pupils are equal, round, and reactive to light.   Neck: Normal range of motion. Neck supple. No JVD present. No tracheal deviation present. No thyromegaly present.   Cardiovascular: Normal rate, regular rhythm, normal heart sounds and intact distal pulses.    No murmur heard.  Pulmonary/Chest: Effort normal and breath sounds normal. She has no wheezes.   Abdominal: Soft. Bowel sounds are normal. She exhibits no distension. There is no tenderness.   Musculoskeletal: Normal range of motion. She exhibits no edema.   Significant crepitus right anterior knee under the scar    Tenderness in the low back and over the SI joint areas   Lymphadenopathy:     She has no cervical adenopathy.   Neurological: She is alert and oriented to person, place, and time. She displays normal reflexes. Coordination normal.   Skin: Skin is warm and dry. No rash noted.   Psychiatric: She has a normal mood and affect.   Nursing note and vitals reviewed.    Assessment/Plan   Prema was seen today for med refill.    Diagnoses and all orders for this visit:    Multiple joint pain    Nausea  -     promethazine (PHENERGAN) 25 MG tablet; Take 1 tablet by mouth Every 6 (Six) Hours As Needed for Nausea.    Chronic nonseasonal allergic rhinitis due to other allergen    Essential hypertension    Other orders  -     HYDROcodone-acetaminophen (NORCO) 7.5-325 MG per tablet; Take 1 tablet by  mouth Every 6 (Six) Hours As Needed for Moderate Pain .  -     montelukast (SINGULAIR) 10 MG tablet; 1 tablet daily    Refilled hydrocodone to use as needed for the knee and back pain.    The patient has read and signed the [facility] Controlled Substance Contract.  I will continue to see patient for regular follow up appointments. Patient is well controlled on the medication.  SHAYLA has been reviewed by me and is updated every 3 months. The patient is aware of the potential for addiction and dependence.   Blood pressure is elevated today but she has been taking some over-the-counter cold medications.  She will monitor at home and contact me if not improving over the next week back to her baseline.  Will go back on Singulair for allergy symptoms as needed as it was effective for her symptoms before.  Contact me if not improving within 1-2 weeks back on the medicine.        This document has been electronically signed by Olga Rojas MD on December 20, 2017 1:26 PM

## 2018-01-17 ENCOUNTER — OFFICE VISIT (OUTPATIENT)
Dept: FAMILY MEDICINE CLINIC | Facility: CLINIC | Age: 58
End: 2018-01-17

## 2018-01-17 VITALS
SYSTOLIC BLOOD PRESSURE: 138 MMHG | BODY MASS INDEX: 46.13 KG/M2 | HEIGHT: 60 IN | WEIGHT: 235 LBS | DIASTOLIC BLOOD PRESSURE: 70 MMHG

## 2018-01-17 DIAGNOSIS — M25.512 ACUTE PAIN OF LEFT SHOULDER: ICD-10-CM
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
## 2018-01-17 DIAGNOSIS — S20.212S CONTUSION OF LEFT CHEST WALL, SEQUELA: ICD-10-CM

## 2018-01-17 PROCEDURE — 99214 OFFICE O/P EST MOD 30 MIN: CPT | Performed by: FAMILY MEDICINE
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
## 2018-02-12 ENCOUNTER — OFFICE VISIT (OUTPATIENT)
Dept: FAMILY MEDICINE CLINIC | Facility: CLINIC | Age: 58
End: 2018-02-12

## 2018-02-12 VITALS
WEIGHT: 255.4 LBS | SYSTOLIC BLOOD PRESSURE: 150 MMHG | DIASTOLIC BLOOD PRESSURE: 94 MMHG | BODY MASS INDEX: 59.11 KG/M2 | HEIGHT: 55 IN

## 2018-02-12 DIAGNOSIS — S49.92XD INJURY OF LEFT SHOULDER, SUBSEQUENT ENCOUNTER: Primary | ICD-10-CM
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
## 2018-02-12 PROCEDURE — 99213 OFFICE O/P EST LOW 20 MIN: CPT | Performed by: FAMILY MEDICINE

## 2018-02-12 RX ORDER — CYCLOBENZAPRINE HCL 10 MG
TABLET ORAL
Refills: 0 | COMMUNITY
Start: 2018-01-29 | End: 2020-10-26
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
## 2018-03-12 DIAGNOSIS — R11.0 NAUSEA: ICD-10-CM

## 2018-03-13 RX ORDER — HYDROCODONE BITARTRATE AND ACETAMINOPHEN 7.5; 325 MG/1; MG/1
1 TABLET ORAL EVERY 6 HOURS PRN
Qty: 100 TABLET | Refills: 0 | Status: SHIPPED | OUTPATIENT
Start: 2018-03-13 | End: 2018-04-20 | Stop reason: SDUPTHER

## 2018-03-14 DIAGNOSIS — R11.0 NAUSEA: ICD-10-CM

## 2018-03-14 RX ORDER — PROMETHAZINE HYDROCHLORIDE 25 MG/1
25 TABLET ORAL EVERY 6 HOURS PRN
Qty: 30 TABLET | Refills: 5 | Status: SHIPPED | OUTPATIENT
Start: 2018-03-14 | End: 2018-04-20 | Stop reason: SDUPTHER

## 2018-03-21 ENCOUNTER — OFFICE VISIT (OUTPATIENT)
Dept: FAMILY MEDICINE CLINIC | Facility: CLINIC | Age: 58
End: 2018-03-21

## 2018-03-21 VITALS
HEIGHT: 60 IN | BODY MASS INDEX: 50.22 KG/M2 | WEIGHT: 255.8 LBS | DIASTOLIC BLOOD PRESSURE: 82 MMHG | SYSTOLIC BLOOD PRESSURE: 140 MMHG

## 2018-03-21 DIAGNOSIS — G89.29 CHRONIC LEFT SHOULDER PAIN: ICD-10-CM

## 2018-03-21 DIAGNOSIS — M25.512 CHRONIC LEFT SHOULDER PAIN: ICD-10-CM
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
## 2018-03-21 PROCEDURE — 99213 OFFICE O/P EST LOW 20 MIN: CPT | Performed by: FAMILY MEDICINE

## 2018-03-21 RX ORDER — CYCLOSPORINE 0.5 MG/ML
1 EMULSION OPHTHALMIC EVERY 12 HOURS
Qty: 5.5 ML | Refills: 3 | Status: SHIPPED | OUTPATIENT
Start: 2018-03-21 | End: 2019-06-20
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
## 2018-04-18 ENCOUNTER — TELEPHONE (OUTPATIENT)
Dept: FAMILY MEDICINE CLINIC | Facility: CLINIC | Age: 58
End: 2018-04-18

## 2018-04-20 ENCOUNTER — OFFICE VISIT (OUTPATIENT)
Dept: FAMILY MEDICINE CLINIC | Facility: CLINIC | Age: 58
End: 2018-04-20

## 2018-04-20 VITALS
SYSTOLIC BLOOD PRESSURE: 150 MMHG | HEIGHT: 60 IN | WEIGHT: 255.6 LBS | BODY MASS INDEX: 50.18 KG/M2 | DIASTOLIC BLOOD PRESSURE: 82 MMHG

## 2018-04-20 DIAGNOSIS — M25.512 ACUTE PAIN OF LEFT SHOULDER: ICD-10-CM

## 2018-04-20 DIAGNOSIS — R11.0 NAUSEA: ICD-10-CM
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
## 2018-04-20 PROCEDURE — 99213 OFFICE O/P EST LOW 20 MIN: CPT | Performed by: FAMILY MEDICINE

## 2018-04-20 RX ORDER — IBUPROFEN 800 MG/1
800 TABLET ORAL 3 TIMES DAILY PRN
Qty: 90 TABLET | Refills: 5 | Status: SHIPPED | OUTPATIENT
Start: 2018-04-20 | End: 2020-10-26

## 2018-04-20 RX ORDER — PROMETHAZINE HYDROCHLORIDE 25 MG/1
25 TABLET ORAL EVERY 6 HOURS PRN
Qty: 30 TABLET | Refills: 5 | Status: SHIPPED | OUTPATIENT
Start: 2018-04-20 | End: 2018-07-20 | Stop reason: SDUPTHER

## 2018-04-20 RX ORDER — HYDROCODONE BITARTRATE AND ACETAMINOPHEN 7.5; 325 MG/1; MG/1
1 TABLET ORAL EVERY 6 HOURS PRN
Qty: 100 TABLET | Refills: 0 | Status: SHIPPED | OUTPATIENT
Start: 2018-04-20 | End: 2018-07-20 | Stop reason: SDUPTHER

## 2018-04-20 RX ORDER — ALBUTEROL SULFATE 90 UG/1
2 AEROSOL, METERED RESPIRATORY (INHALATION) EVERY 6 HOURS PRN
Qty: 8 G | Refills: 5 | Status: SHIPPED | OUTPATIENT
Start: 2018-04-20 | End: 2018-12-20 | Stop reason: SDUPTHER
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
## 2018-07-20 ENCOUNTER — OFFICE VISIT (OUTPATIENT)
Dept: FAMILY MEDICINE CLINIC | Facility: CLINIC | Age: 58
End: 2018-07-20

## 2018-07-20 VITALS
BODY MASS INDEX: 50.06 KG/M2 | WEIGHT: 255 LBS | DIASTOLIC BLOOD PRESSURE: 72 MMHG | SYSTOLIC BLOOD PRESSURE: 148 MMHG | HEIGHT: 60 IN

## 2018-07-20 DIAGNOSIS — M25.512 CHRONIC LEFT SHOULDER PAIN: ICD-10-CM

## 2018-07-20 DIAGNOSIS — G89.29 CHRONIC LEFT SHOULDER PAIN: ICD-10-CM
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
## 2018-07-20 DIAGNOSIS — R11.0 NAUSEA: ICD-10-CM

## 2018-07-20 PROCEDURE — 99214 OFFICE O/P EST MOD 30 MIN: CPT | Performed by: FAMILY MEDICINE

## 2018-07-20 RX ORDER — HYDROCODONE BITARTRATE AND ACETAMINOPHEN 7.5; 325 MG/1; MG/1
1 TABLET ORAL EVERY 6 HOURS PRN
Qty: 100 TABLET | Refills: 0 | Status: SHIPPED | OUTPATIENT
Start: 2018-07-20 | End: 2018-08-22 | Stop reason: SDUPTHER

## 2018-07-20 RX ORDER — PROMETHAZINE HYDROCHLORIDE 25 MG/1
25 TABLET ORAL EVERY 6 HOURS PRN
Qty: 30 TABLET | Refills: 5 | Status: SHIPPED | OUTPATIENT
Start: 2018-07-20 | End: 2018-11-09 | Stop reason: SDUPTHER

## 2018-07-20 RX ORDER — PROMETHAZINE HYDROCHLORIDE 25 MG/1
25 TABLET ORAL EVERY 6 HOURS PRN
Qty: 30 TABLET | Refills: 5 | Status: SHIPPED | OUTPATIENT
Start: 2018-07-20 | End: 2018-07-20 | Stop reason: SDUPTHER

## 2018-07-20 RX ORDER — HYDROCODONE BITARTRATE AND ACETAMINOPHEN 7.5; 325 MG/1; MG/1
1 TABLET ORAL EVERY 6 HOURS PRN
Qty: 100 TABLET | Refills: 0 | Status: SHIPPED | OUTPATIENT
Start: 2018-07-20 | End: 2018-07-20 | Stop reason: SDUPTHER
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
## 2018-08-22 ENCOUNTER — OFFICE VISIT (OUTPATIENT)
Dept: FAMILY MEDICINE CLINIC | Facility: CLINIC | Age: 58
End: 2018-08-22

## 2018-08-22 VITALS
SYSTOLIC BLOOD PRESSURE: 140 MMHG | BODY MASS INDEX: 48.93 KG/M2 | DIASTOLIC BLOOD PRESSURE: 88 MMHG | WEIGHT: 249.2 LBS | HEIGHT: 60 IN

## 2018-08-22 DIAGNOSIS — Z23 NEED FOR VACCINATION: ICD-10-CM

## 2018-08-22 DIAGNOSIS — S49.92XS INJURY OF LEFT SHOULDER, SEQUELA: Primary | ICD-10-CM

## 2018-08-22 PROCEDURE — 90715 TDAP VACCINE 7 YRS/> IM: CPT | Performed by: FAMILY MEDICINE

## 2018-08-22 PROCEDURE — 90471 IMMUNIZATION ADMIN: CPT | Performed by: FAMILY MEDICINE

## 2018-08-22 PROCEDURE — 99214 OFFICE O/P EST MOD 30 MIN: CPT | Performed by: FAMILY MEDICINE

## 2018-08-22 RX ORDER — HYDROCODONE BITARTRATE AND ACETAMINOPHEN 7.5; 325 MG/1; MG/1
1 TABLET ORAL EVERY 6 HOURS PRN
Qty: 100 TABLET | Refills: 0 | Status: SHIPPED | OUTPATIENT
Start: 2018-08-22 | End: 2018-09-20 | Stop reason: SDUPTHER
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
## 2018-09-06 DIAGNOSIS — S49.92XS INJURY OF LEFT SHOULDER, SEQUELA: ICD-10-CM
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
## 2018-09-20 ENCOUNTER — OFFICE VISIT (OUTPATIENT)
Dept: FAMILY MEDICINE CLINIC | Facility: CLINIC | Age: 58
End: 2018-09-20

## 2018-09-20 VITALS
BODY MASS INDEX: 48.69 KG/M2 | SYSTOLIC BLOOD PRESSURE: 158 MMHG | WEIGHT: 248 LBS | DIASTOLIC BLOOD PRESSURE: 88 MMHG | HEIGHT: 60 IN

## 2018-09-20 DIAGNOSIS — M67.912 TENDINOPATHY OF LEFT ROTATOR CUFF: Primary | ICD-10-CM

## 2018-09-20 PROCEDURE — 99214 OFFICE O/P EST MOD 30 MIN: CPT | Performed by: FAMILY MEDICINE

## 2018-09-20 RX ORDER — HYDROCODONE BITARTRATE AND ACETAMINOPHEN 7.5; 325 MG/1; MG/1
1 TABLET ORAL EVERY 6 HOURS PRN
Qty: 100 TABLET | Refills: 0 | Status: SHIPPED | OUTPATIENT
Start: 2018-09-20 | End: 2018-11-09 | Stop reason: SDUPTHER
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
## 2018-11-09 DIAGNOSIS — R11.0 NAUSEA: ICD-10-CM

## 2018-11-09 RX ORDER — PROMETHAZINE HYDROCHLORIDE 25 MG/1
25 TABLET ORAL EVERY 6 HOURS PRN
Qty: 30 TABLET | Refills: 1 | Status: SHIPPED | OUTPATIENT
Start: 2018-11-09 | End: 2018-11-29 | Stop reason: SDUPTHER

## 2018-11-09 RX ORDER — HYDROCODONE BITARTRATE AND ACETAMINOPHEN 7.5; 325 MG/1; MG/1
1 TABLET ORAL EVERY 6 HOURS PRN
Qty: 100 TABLET | Refills: 0 | Status: SHIPPED | OUTPATIENT
Start: 2018-11-09 | End: 2018-12-20 | Stop reason: SDUPTHER

## 2018-11-29 DIAGNOSIS — R11.0 NAUSEA: ICD-10-CM

## 2018-11-29 RX ORDER — PROMETHAZINE HYDROCHLORIDE 25 MG/1
25 TABLET ORAL EVERY 6 HOURS PRN
Qty: 30 TABLET | Refills: 1 | Status: SHIPPED | OUTPATIENT
Start: 2018-11-29 | End: 2018-12-20 | Stop reason: SDUPTHER

## 2018-12-20 ENCOUNTER — OFFICE VISIT (OUTPATIENT)
Dept: FAMILY MEDICINE CLINIC | Facility: CLINIC | Age: 58
End: 2018-12-20

## 2018-12-20 ENCOUNTER — LAB (OUTPATIENT)
Dept: LAB | Facility: OTHER | Age: 58
End: 2018-12-20

## 2018-12-20 VITALS
BODY MASS INDEX: 46.48 KG/M2 | TEMPERATURE: 97.9 F | DIASTOLIC BLOOD PRESSURE: 84 MMHG | WEIGHT: 238 LBS | HEART RATE: 100 BPM | SYSTOLIC BLOOD PRESSURE: 162 MMHG

## 2018-12-20 DIAGNOSIS — I10 ESSENTIAL HYPERTENSION: ICD-10-CM

## 2018-12-20 DIAGNOSIS — R11.0 NAUSEA: ICD-10-CM

## 2018-12-20 DIAGNOSIS — M25.50 MULTIPLE JOINT PAIN: Primary | ICD-10-CM

## 2018-12-20 DIAGNOSIS — R06.2 WHEEZING: ICD-10-CM

## 2018-12-20 DIAGNOSIS — D17.9 LIPOMA, UNSPECIFIED SITE: ICD-10-CM

## 2018-12-20 LAB
ALBUMIN SERPL-MCNC: 4.8 G/DL (ref 3.5–5)
ALBUMIN/GLOB SERPL: 1.5 G/DL (ref 1.1–1.8)
ALP SERPL-CCNC: 103 U/L (ref 38–126)
ALT SERPL W P-5'-P-CCNC: 43 U/L
ANION GAP SERPL CALCULATED.3IONS-SCNC: 8 MMOL/L (ref 5–15)
AST SERPL-CCNC: 56 U/L (ref 14–36)
BASOPHILS # BLD AUTO: 0.04 10*3/MM3 (ref 0–0.2)
BASOPHILS NFR BLD AUTO: 0.4 % (ref 0–2)
BILIRUB SERPL-MCNC: 0.9 MG/DL (ref 0.2–1.3)
BUN BLD-MCNC: 13 MG/DL (ref 7–17)
BUN/CREAT SERPL: 18.3 (ref 7–25)
CALCIUM SPEC-SCNC: 9.6 MG/DL (ref 8.4–10.2)
CHLORIDE SERPL-SCNC: 103 MMOL/L (ref 98–107)
CHOLEST SERPL-MCNC: 238 MG/DL (ref 150–200)
CO2 SERPL-SCNC: 28 MMOL/L (ref 22–30)
CREAT BLD-MCNC: 0.71 MG/DL (ref 0.52–1.04)
DEPRECATED RDW RBC AUTO: 43.8 FL (ref 36.4–46.3)
EOSINOPHIL # BLD AUTO: 0.29 10*3/MM3 (ref 0–0.7)
EOSINOPHIL NFR BLD AUTO: 2.8 % (ref 0–7)
ERYTHROCYTE [DISTWIDTH] IN BLOOD BY AUTOMATED COUNT: 13.6 % (ref 11.5–14.5)
GFR SERPL CREATININE-BSD FRML MDRD: 84 ML/MIN/1.73 (ref 51–120)
GLOBULIN UR ELPH-MCNC: 3.3 GM/DL (ref 2.3–3.5)
GLUCOSE BLD-MCNC: 148 MG/DL (ref 74–99)
HCT VFR BLD AUTO: 42.8 % (ref 35–45)
HDLC SERPL-MCNC: 48 MG/DL (ref 40–59)
HGB BLD-MCNC: 13.7 G/DL (ref 12–15.5)
LDLC SERPL CALC-MCNC: 148 MG/DL
LDLC/HDLC SERPL: 3.08 {RATIO} (ref 0–3.22)
LYMPHOCYTES # BLD AUTO: 2.63 10*3/MM3 (ref 0.6–4.2)
LYMPHOCYTES NFR BLD AUTO: 25.5 % (ref 10–50)
MCH RBC QN AUTO: 28.7 PG (ref 26.5–34)
MCHC RBC AUTO-ENTMCNC: 32 G/DL (ref 31.4–36)
MCV RBC AUTO: 89.5 FL (ref 80–98)
MONOCYTES # BLD AUTO: 0.98 10*3/MM3 (ref 0–0.9)
MONOCYTES NFR BLD AUTO: 9.5 % (ref 0–12)
NEUTROPHILS # BLD AUTO: 6.37 10*3/MM3 (ref 2–8.6)
NEUTROPHILS NFR BLD AUTO: 61.8 % (ref 37–80)
PLATELET # BLD AUTO: 309 10*3/MM3 (ref 150–450)
PMV BLD AUTO: 10.3 FL (ref 8–12)
POTASSIUM BLD-SCNC: 3.8 MMOL/L (ref 3.4–5)
PROT SERPL-MCNC: 8.1 G/DL (ref 6.3–8.2)
RBC # BLD AUTO: 4.78 10*6/MM3 (ref 3.77–5.16)
SODIUM BLD-SCNC: 139 MMOL/L (ref 137–145)
T4 FREE SERPL-MCNC: 1.16 NG/DL (ref 0.78–2.19)
TRIGL SERPL-MCNC: 210 MG/DL
TSH SERPL DL<=0.05 MIU/L-ACNC: 4.68 MIU/ML (ref 0.46–4.68)
VLDLC SERPL-MCNC: 42 MG/DL
WBC NRBC COR # BLD: 10.31 10*3/MM3 (ref 3.2–9.8)

## 2018-12-20 PROCEDURE — 99214 OFFICE O/P EST MOD 30 MIN: CPT | Performed by: FAMILY MEDICINE

## 2018-12-20 PROCEDURE — 85025 COMPLETE CBC W/AUTO DIFF WBC: CPT | Performed by: FAMILY MEDICINE

## 2018-12-20 PROCEDURE — 84439 ASSAY OF FREE THYROXINE: CPT | Performed by: FAMILY MEDICINE

## 2018-12-20 PROCEDURE — 36415 COLL VENOUS BLD VENIPUNCTURE: CPT | Performed by: FAMILY MEDICINE

## 2018-12-20 PROCEDURE — 84443 ASSAY THYROID STIM HORMONE: CPT | Performed by: FAMILY MEDICINE

## 2018-12-20 PROCEDURE — 80061 LIPID PANEL: CPT | Performed by: FAMILY MEDICINE

## 2018-12-20 PROCEDURE — 80053 COMPREHEN METABOLIC PANEL: CPT | Performed by: FAMILY MEDICINE

## 2018-12-20 RX ORDER — HYDROCODONE BITARTRATE AND ACETAMINOPHEN 7.5; 325 MG/1; MG/1
1 TABLET ORAL EVERY 6 HOURS PRN
Qty: 100 TABLET | Refills: 0 | Status: SHIPPED | OUTPATIENT
Start: 2018-12-20 | End: 2019-01-21 | Stop reason: SDUPTHER

## 2018-12-20 RX ORDER — LOSARTAN POTASSIUM 100 MG/1
100 TABLET ORAL DAILY
Qty: 30 TABLET | Refills: 5 | Status: SHIPPED | OUTPATIENT
Start: 2018-12-20 | End: 2019-05-21 | Stop reason: ALTCHOICE

## 2018-12-20 RX ORDER — ALBUTEROL SULFATE 90 UG/1
2 AEROSOL, METERED RESPIRATORY (INHALATION) EVERY 6 HOURS PRN
Qty: 8 G | Refills: 5 | Status: SHIPPED | OUTPATIENT
Start: 2018-12-20 | End: 2020-07-06

## 2018-12-20 RX ORDER — PROMETHAZINE HYDROCHLORIDE 25 MG/1
25 TABLET ORAL EVERY 6 HOURS PRN
Qty: 30 TABLET | Refills: 1 | Status: SHIPPED | OUTPATIENT
Start: 2018-12-20 | End: 2019-01-16 | Stop reason: SDUPTHER

## 2018-12-20 NOTE — PROGRESS NOTES
Subjective   Prema Curtis is a 58 y.o. female.  She's here today for follow-up on joint pain refill medications.   She still having a lot of pain in her left shoulder , knees, and several other joints.  Also she notes a lump in the right rib cage area in the mid axillary line.  She says that she has bumped it on her sink a few times due to distinct location.  It is not painful.  She only noticed it for the first time a few days ago.  She has some occasional wheezing and needs a refill of her albuterol inhaler which seems to help with this.  Her blood pressures been running high over the past several visits and she's noted at home and often has been she feels it's associated with pain but it is staying all the time    History of Present Illness   The following portions of the patient's history were reviewed and updated as appropriate: allergies, current medications, past family history, past medical history, past social history, past surgical history and problem list.    Review of Systems   Constitutional: Negative.    HENT: Positive for sinus pain. Negative for congestion, postnasal drip, rhinorrhea and sneezing.    Respiratory: Negative for cough and shortness of breath.    Cardiovascular: Negative.    Gastrointestinal: Negative.    Musculoskeletal: Positive for arthralgias, joint swelling and myalgias.   Skin: Negative.    Allergic/Immunologic: Negative for immunocompromised state.   Neurological: Negative for dizziness, tremors, seizures and syncope.   Hematological: Negative.    Psychiatric/Behavioral: Negative for agitation, confusion, dysphoric mood and sleep disturbance. The patient is not nervous/anxious.    All other systems reviewed and are negative.    Objective   Physical Exam   Constitutional: She is oriented to person, place, and time. She appears well-developed and well-nourished.   HENT:   Head: Normocephalic and atraumatic.   Nose: Nose normal.   Mouth/Throat: Oropharynx is clear and moist.    Eyes: Conjunctivae and EOM are normal. Pupils are equal, round, and reactive to light.   Neck: Normal range of motion. Neck supple. No JVD present. No tracheal deviation present. No thyromegaly present.   Cardiovascular: Normal rate, regular rhythm, normal heart sounds and intact distal pulses.   No murmur heard.  Pulmonary/Chest: Effort normal and breath sounds normal. She has no wheezes.   Abdominal: Soft. Bowel sounds are normal. She exhibits no distension. There is no tenderness.   Musculoskeletal: She exhibits tenderness. She exhibits no edema.   Decreased range of motion in the left shoulder in all directions with pain anteriorly   Lymphadenopathy:     She has no cervical adenopathy.   Neurological: She is alert and oriented to person, place, and time. She displays normal reflexes. Coordination abnormal.   Skin: Skin is warm and dry. No rash noted.   Psychiatric: She has a normal mood and affect.   Nursing note and vitals reviewed.    Assessment/Plan   Prema was seen today for follow-up.    Diagnoses and all orders for this visit:    Multiple joint pain    Lipoma, unspecified site    Nausea  -     promethazine (PHENERGAN) 25 MG tablet; Take 1 tablet by mouth Every 6 (Six) Hours As Needed for Nausea.    Essential hypertension  -     CBC & Differential; Future  -     Comprehensive Metabolic Panel  -     Lipid Panel; Future  -     TSH  -     T4, Free    Wheezing    Other orders  -     albuterol sulfate HFA (PROAIR HFA) 108 (90 Base) MCG/ACT inhaler; Inhale 2 puffs Every 6 (Six) Hours As Needed for Wheezing or Shortness of Air.  -     HYDROcodone-acetaminophen (NORCO) 7.5-325 MG per tablet; Take 1 tablet by mouth Every 6 (Six) Hours As Needed for Moderate Pain .  -     mupirocin (BACTROBAN) 2 % ointment; Apply  topically to the appropriate area as directed 3 (Three) Times a Day.  -     losartan (COZAAR) 100 MG tablet; Take 1 tablet by mouth Daily.    The patient has read and signed the [facility]  Substance Contract.  I will continue to see patient for regular follow up appointments. Patient is well controlled on the medication.  SHAYLA has been reviewed by me and is updated every 3 months. The patient is aware of the potential for addiction and dependence.   Continue Norco for pain in this patient who is not a good NSAID candidate.    We'll add losartan for hypertension as it has been running consistently high    Refilled albuterol for wheezing     Reassurance given regarding the lipoma that she is advised to check it periodically if she feels it's enlarging or changing to return immediately          This document has been electronically signed by Olga Rojas MD on December 20, 2018 2:07 PM

## 2018-12-20 NOTE — PROGRESS NOTES
Please call the patient regarding her abnormal result.  To like to see her back in a month and recheck some labs.  2 of her liver enzymes are mildly elevated and this may be because her cholesterol is high.  Her white blood cell count is also elevated which may be from her recent infection.  Please order a CBC and CMP and have her do this in a month

## 2018-12-21 DIAGNOSIS — D72.829 LEUKOCYTOSIS, UNSPECIFIED TYPE: ICD-10-CM

## 2018-12-21 DIAGNOSIS — R74.8 ELEVATED LIVER ENZYMES: Primary | ICD-10-CM

## 2018-12-21 NOTE — PROGRESS NOTES
Please call the patient regarding her abnormal result.  Cholesterol is a little high but a tiny bit better than last time.  Thyroid is okay.

## 2019-01-16 DIAGNOSIS — R11.0 NAUSEA: ICD-10-CM

## 2019-01-16 RX ORDER — PROMETHAZINE HYDROCHLORIDE 25 MG/1
25 TABLET ORAL EVERY 6 HOURS PRN
Qty: 30 TABLET | Refills: 1 | Status: SHIPPED | OUTPATIENT
Start: 2019-01-16 | End: 2019-01-21 | Stop reason: SDUPTHER

## 2019-01-21 ENCOUNTER — OFFICE VISIT (OUTPATIENT)
Dept: FAMILY MEDICINE CLINIC | Facility: CLINIC | Age: 59
End: 2019-01-21

## 2019-01-21 ENCOUNTER — LAB (OUTPATIENT)
Dept: LAB | Facility: OTHER | Age: 59
End: 2019-01-21

## 2019-01-21 VITALS
DIASTOLIC BLOOD PRESSURE: 82 MMHG | SYSTOLIC BLOOD PRESSURE: 160 MMHG | HEIGHT: 60 IN | BODY MASS INDEX: 46.13 KG/M2 | WEIGHT: 235 LBS

## 2019-01-21 DIAGNOSIS — R74.8 ELEVATED LIVER ENZYMES: ICD-10-CM

## 2019-01-21 DIAGNOSIS — D72.829 LEUKOCYTOSIS, UNSPECIFIED TYPE: ICD-10-CM

## 2019-01-21 DIAGNOSIS — M54.50 BILATERAL LOW BACK PAIN WITHOUT SCIATICA, UNSPECIFIED CHRONICITY: ICD-10-CM

## 2019-01-21 DIAGNOSIS — J06.9 ACUTE URI: Primary | ICD-10-CM

## 2019-01-21 DIAGNOSIS — R11.0 NAUSEA: ICD-10-CM

## 2019-01-21 LAB
ALBUMIN SERPL-MCNC: 4.5 G/DL (ref 3.5–5)
ALBUMIN/GLOB SERPL: 1.2 G/DL (ref 1.1–1.8)
ALP SERPL-CCNC: 117 U/L (ref 38–126)
ALT SERPL W P-5'-P-CCNC: 48 U/L
ANION GAP SERPL CALCULATED.3IONS-SCNC: 5 MMOL/L (ref 5–15)
AST SERPL-CCNC: 50 U/L (ref 14–36)
BASOPHILS # BLD AUTO: 0.03 10*3/MM3 (ref 0–0.2)
BASOPHILS NFR BLD AUTO: 0.3 % (ref 0–2)
BILIRUB SERPL-MCNC: 0.7 MG/DL (ref 0.2–1.3)
BUN BLD-MCNC: 15 MG/DL (ref 7–17)
BUN/CREAT SERPL: 18.3 (ref 7–25)
CALCIUM SPEC-SCNC: 9.5 MG/DL (ref 8.4–10.2)
CHLORIDE SERPL-SCNC: 103 MMOL/L (ref 98–107)
CO2 SERPL-SCNC: 29 MMOL/L (ref 22–30)
CREAT BLD-MCNC: 0.82 MG/DL (ref 0.52–1.04)
DEPRECATED RDW RBC AUTO: 43.5 FL (ref 36.4–46.3)
EOSINOPHIL # BLD AUTO: 0.32 10*3/MM3 (ref 0–0.7)
EOSINOPHIL NFR BLD AUTO: 3.3 % (ref 0–7)
ERYTHROCYTE [DISTWIDTH] IN BLOOD BY AUTOMATED COUNT: 13.5 % (ref 11.5–14.5)
GFR SERPL CREATININE-BSD FRML MDRD: 71 ML/MIN/1.73 (ref 51–120)
GLOBULIN UR ELPH-MCNC: 3.7 GM/DL (ref 2.3–3.5)
GLUCOSE BLD-MCNC: 230 MG/DL (ref 74–99)
HCT VFR BLD AUTO: 42.6 % (ref 35–45)
HGB BLD-MCNC: 13.8 G/DL (ref 12–15.5)
LYMPHOCYTES # BLD AUTO: 2.21 10*3/MM3 (ref 0.6–4.2)
LYMPHOCYTES NFR BLD AUTO: 23 % (ref 10–50)
MCH RBC QN AUTO: 29.2 PG (ref 26.5–34)
MCHC RBC AUTO-ENTMCNC: 32.4 G/DL (ref 31.4–36)
MCV RBC AUTO: 90.1 FL (ref 80–98)
MONOCYTES # BLD AUTO: 0.96 10*3/MM3 (ref 0–0.9)
MONOCYTES NFR BLD AUTO: 10 % (ref 0–12)
NEUTROPHILS # BLD AUTO: 6.08 10*3/MM3 (ref 2–8.6)
NEUTROPHILS NFR BLD AUTO: 63.4 % (ref 37–80)
PLATELET # BLD AUTO: 369 10*3/MM3 (ref 150–450)
PMV BLD AUTO: 10.1 FL (ref 8–12)
POTASSIUM BLD-SCNC: 3.9 MMOL/L (ref 3.4–5)
PROT SERPL-MCNC: 8.2 G/DL (ref 6.3–8.2)
RBC # BLD AUTO: 4.73 10*6/MM3 (ref 3.77–5.16)
SODIUM BLD-SCNC: 137 MMOL/L (ref 137–145)
WBC NRBC COR # BLD: 9.6 10*3/MM3 (ref 3.2–9.8)

## 2019-01-21 PROCEDURE — 99214 OFFICE O/P EST MOD 30 MIN: CPT | Performed by: FAMILY MEDICINE

## 2019-01-21 PROCEDURE — 80053 COMPREHEN METABOLIC PANEL: CPT | Performed by: FAMILY MEDICINE

## 2019-01-21 PROCEDURE — 85025 COMPLETE CBC W/AUTO DIFF WBC: CPT | Performed by: FAMILY MEDICINE

## 2019-01-21 PROCEDURE — 36415 COLL VENOUS BLD VENIPUNCTURE: CPT | Performed by: FAMILY MEDICINE

## 2019-01-21 RX ORDER — HYDROCODONE BITARTRATE AND ACETAMINOPHEN 7.5; 325 MG/1; MG/1
1 TABLET ORAL EVERY 6 HOURS PRN
Qty: 100 TABLET | Refills: 0 | Status: SHIPPED | OUTPATIENT
Start: 2019-01-21 | End: 2019-02-21 | Stop reason: SDUPTHER

## 2019-01-21 RX ORDER — PROMETHAZINE HYDROCHLORIDE 25 MG/1
25 TABLET ORAL EVERY 6 HOURS PRN
Qty: 30 TABLET | Refills: 1 | Status: SHIPPED | OUTPATIENT
Start: 2019-01-21 | End: 2019-02-21 | Stop reason: SDUPTHER

## 2019-02-21 ENCOUNTER — OFFICE VISIT (OUTPATIENT)
Dept: FAMILY MEDICINE CLINIC | Facility: CLINIC | Age: 59
End: 2019-02-21

## 2019-02-21 VITALS
BODY MASS INDEX: 46.53 KG/M2 | HEIGHT: 60 IN | WEIGHT: 237 LBS | DIASTOLIC BLOOD PRESSURE: 82 MMHG | SYSTOLIC BLOOD PRESSURE: 136 MMHG

## 2019-02-21 DIAGNOSIS — R11.0 NAUSEA: ICD-10-CM

## 2019-02-21 DIAGNOSIS — M25.561 CHRONIC PAIN OF RIGHT KNEE: ICD-10-CM

## 2019-02-21 DIAGNOSIS — Z12.31 ENCOUNTER FOR SCREENING MAMMOGRAM FOR MALIGNANT NEOPLASM OF BREAST: ICD-10-CM

## 2019-02-21 DIAGNOSIS — G89.29 CHRONIC PAIN OF RIGHT KNEE: ICD-10-CM

## 2019-02-21 DIAGNOSIS — M54.50 BILATERAL LOW BACK PAIN WITHOUT SCIATICA, UNSPECIFIED CHRONICITY: Primary | ICD-10-CM

## 2019-02-21 PROCEDURE — 99214 OFFICE O/P EST MOD 30 MIN: CPT | Performed by: FAMILY MEDICINE

## 2019-02-21 RX ORDER — PROMETHAZINE HYDROCHLORIDE 25 MG/1
25 TABLET ORAL EVERY 6 HOURS PRN
Qty: 30 TABLET | Refills: 1 | Status: SHIPPED | OUTPATIENT
Start: 2019-02-21 | End: 2019-03-22 | Stop reason: SDUPTHER

## 2019-02-21 RX ORDER — HYDROCODONE BITARTRATE AND ACETAMINOPHEN 7.5; 325 MG/1; MG/1
1 TABLET ORAL EVERY 6 HOURS PRN
Qty: 100 TABLET | Refills: 0 | Status: SHIPPED | OUTPATIENT
Start: 2019-02-21 | End: 2019-03-22 | Stop reason: SDUPTHER

## 2019-02-21 NOTE — PROGRESS NOTES
Subjective   Prema Curtis is a 59 y.o. female.  She's here today for follow-up on joint pain refill medications.    A refill of hydrocodone.  She tries to take it only when needed for the more severe pain but does take it daily.    History of Present Illness   The following portions of the patient's history were reviewed and updated as appropriate: allergies, current medications, past family history, past medical history, past social history, past surgical history and problem list.    Review of Systems   Constitutional: Negative.    HENT: Negative for postnasal drip, rhinorrhea and sneezing.    Respiratory: Negative for shortness of breath.    Cardiovascular: Negative.    Gastrointestinal: Negative.    Skin: Negative.    Allergic/Immunologic: Negative for immunocompromised state.   Neurological: Negative for dizziness, tremors, seizures and syncope.   Hematological: Negative.    Psychiatric/Behavioral: Negative for agitation, confusion, dysphoric mood and sleep disturbance. The patient is not nervous/anxious.    All other systems reviewed and are negative.    Objective   Physical Exam   Constitutional: She is oriented to person, place, and time. She appears well-developed and well-nourished.   HENT:   Head: Normocephalic and atraumatic.   Nose: Nose normal.   Mouth/Throat: Oropharynx is clear and moist.   TMs retracted, posterior pharynx shows mild lymphoid hyperplasia but no significant tonsillar enlargement or erythema   Eyes: Conjunctivae and EOM are normal. Pupils are equal, round, and reactive to light.   Neck: Normal range of motion. Neck supple. No JVD present. No tracheal deviation present. No thyromegaly present.   Cardiovascular: Normal rate, regular rhythm, normal heart sounds and intact distal pulses.   No murmur heard.  Pulmonary/Chest: Effort normal and breath sounds normal. She has no wheezes.   Abdominal: Soft. Bowel sounds are normal. She exhibits no distension. There is no tenderness.    Musculoskeletal: She exhibits tenderness. She exhibits no edema.   Decreased range of motion in the left shoulder in all directions with pain anteriorly   Lymphadenopathy:     She has no cervical adenopathy.   Neurological: She is alert and oriented to person, place, and time. She displays normal reflexes. Coordination abnormal.   Skin: Skin is warm and dry. No rash noted.   Psychiatric: She has a normal mood and affect.   Nursing note and vitals reviewed.    Assessment/Plan   Prema was seen today for follow-up.    Diagnoses and all orders for this visit:    Bilateral low back pain without sciatica, unspecified chronicity    Chronic pain of right knee    Nausea  -     promethazine (PHENERGAN) 25 MG tablet; Take 1 tablet by mouth Every 6 (Six) Hours As Needed for Nausea.    Encounter for screening mammogram for malignant neoplasm of breast  -     Mammo Screening Digital Tomosynthesis Bilateral With CAD; Future    Other orders  -     HYDROcodone-acetaminophen (NORCO) 7.5-325 MG per tablet; Take 1 tablet by mouth Every 6 (Six) Hours As Needed for Moderate Pain .    The patient has read and signed the [facility] Controlled Substance Contract.  I will continue to see patient for regular follow up appointments. Patient is well controlled on the medication.  SHAYLA has been reviewed by me and is updated every 3 months. The patient is aware of the potential for addiction and dependence.   We will order screening mammogram is due    Continue with hydrocodone when needed for pain issues related to knee and joint pain.    Refilled Phenergan that she uses intermittently for nausea        This document has been electronically signed by Olga Rojas MD on February 21, 2019 3:11 PM

## 2019-03-22 DIAGNOSIS — R11.0 NAUSEA: ICD-10-CM

## 2019-03-22 RX ORDER — PROMETHAZINE HYDROCHLORIDE 25 MG/1
25 TABLET ORAL EVERY 6 HOURS PRN
Qty: 30 TABLET | Refills: 1 | Status: SHIPPED | OUTPATIENT
Start: 2019-03-22 | End: 2019-04-22 | Stop reason: SDUPTHER

## 2019-03-22 RX ORDER — HYDROCODONE BITARTRATE AND ACETAMINOPHEN 7.5; 325 MG/1; MG/1
1 TABLET ORAL EVERY 6 HOURS PRN
Qty: 100 TABLET | Refills: 0 | Status: SHIPPED | OUTPATIENT
Start: 2019-03-22 | End: 2019-04-22 | Stop reason: SDUPTHER

## 2019-04-22 DIAGNOSIS — R11.0 NAUSEA: ICD-10-CM

## 2019-04-22 RX ORDER — HYDROCODONE BITARTRATE AND ACETAMINOPHEN 7.5; 325 MG/1; MG/1
1 TABLET ORAL EVERY 6 HOURS PRN
Qty: 100 TABLET | Refills: 0 | Status: SHIPPED | OUTPATIENT
Start: 2019-04-22 | End: 2019-05-21 | Stop reason: SDUPTHER

## 2019-04-23 RX ORDER — PROMETHAZINE HYDROCHLORIDE 25 MG/1
TABLET ORAL
Qty: 30 TABLET | Refills: 1 | Status: SHIPPED | OUTPATIENT
Start: 2019-04-23 | End: 2019-05-21 | Stop reason: SDUPTHER

## 2019-05-21 ENCOUNTER — OFFICE VISIT (OUTPATIENT)
Dept: FAMILY MEDICINE CLINIC | Facility: CLINIC | Age: 59
End: 2019-05-21

## 2019-05-21 VITALS
BODY MASS INDEX: 46.13 KG/M2 | HEIGHT: 60 IN | WEIGHT: 235 LBS | DIASTOLIC BLOOD PRESSURE: 86 MMHG | SYSTOLIC BLOOD PRESSURE: 144 MMHG

## 2019-05-21 DIAGNOSIS — R11.0 NAUSEA: ICD-10-CM

## 2019-05-21 DIAGNOSIS — M25.50 MULTIPLE JOINT PAIN: ICD-10-CM

## 2019-05-21 DIAGNOSIS — I10 ESSENTIAL HYPERTENSION: Primary | ICD-10-CM

## 2019-05-21 PROCEDURE — 99214 OFFICE O/P EST MOD 30 MIN: CPT | Performed by: FAMILY MEDICINE

## 2019-05-21 RX ORDER — PROMETHAZINE HYDROCHLORIDE 25 MG/1
25 TABLET ORAL EVERY 6 HOURS PRN
Qty: 30 TABLET | Refills: 1 | Status: SHIPPED | OUTPATIENT
Start: 2019-05-21 | End: 2019-06-06 | Stop reason: SDUPTHER

## 2019-05-21 RX ORDER — HYDROCODONE BITARTRATE AND ACETAMINOPHEN 7.5; 325 MG/1; MG/1
1 TABLET ORAL EVERY 6 HOURS PRN
Qty: 100 TABLET | Refills: 0 | Status: SHIPPED | OUTPATIENT
Start: 2019-05-21 | End: 2019-06-20 | Stop reason: SDUPTHER

## 2019-05-21 RX ORDER — LISINOPRIL 10 MG/1
10 TABLET ORAL DAILY
Qty: 30 TABLET | Refills: 5 | Status: SHIPPED | OUTPATIENT
Start: 2019-05-21 | End: 2019-08-22 | Stop reason: SDUPTHER

## 2019-05-21 RX ORDER — AZELASTINE 1 MG/ML
2 SPRAY, METERED NASAL 2 TIMES DAILY
Refills: 11 | COMMUNITY
Start: 2019-04-27

## 2019-05-21 NOTE — PROGRESS NOTES
Subjective   Prema Curtis is a 59 y.o. female.  She's here today for follow-up on joint pain refill medications.  She would like a refill of her pain medicine, hydrocodone.  She tries to take it only when needed for the more severe pain but does take it daily.  She is having some popping and cracking in the right knee but the pain there is better.  She has pain in other joints however.  Also, she still losing weight and is very pleased with this.  She wants to refill Phenergan that she takes on occasion for nausea    She is on losartan and her blood pressure still a bit high.  She got a letter from her insurance company that it is recalled and she needs to switch to something else.    History of Present Illness   The following portions of the patient's history were reviewed and updated as appropriate: allergies, current medications, past family history, past medical history, past social history, past surgical history and problem list.    Review of Systems   Constitutional: Negative.    HENT: Negative for postnasal drip, rhinorrhea and sneezing.    Respiratory: Negative for shortness of breath.    Cardiovascular: Negative.    Gastrointestinal: Negative.    Skin: Negative.    Allergic/Immunologic: Negative for immunocompromised state.   Neurological: Negative for dizziness, tremors, seizures and syncope.   Hematological: Negative.    Psychiatric/Behavioral: Negative for agitation, confusion, dysphoric mood and sleep disturbance. The patient is not nervous/anxious.    All other systems reviewed and are negative.    Objective   Physical Exam   Constitutional: She is oriented to person, place, and time. She appears well-developed and well-nourished.   HENT:   Head: Normocephalic and atraumatic.   Nose: Nose normal.   Mouth/Throat: Oropharynx is clear and moist.   Eyes: Conjunctivae and EOM are normal. Pupils are equal, round, and reactive to light.   Neck: Normal range of motion. Neck supple. No JVD present. No  tracheal deviation present. No thyromegaly present.   Cardiovascular: Normal rate, regular rhythm, normal heart sounds and intact distal pulses.   No murmur heard.  Pulmonary/Chest: Effort normal and breath sounds normal. She has no wheezes.   Abdominal: Soft. Bowel sounds are normal. She exhibits no distension. There is no tenderness.   Musculoskeletal: She exhibits tenderness. She exhibits no edema.   Decreased range of motion in the left shoulder in all directions with pain anteriorly   Lymphadenopathy:     She has no cervical adenopathy.   Neurological: She is alert and oriented to person, place, and time. She displays normal reflexes. Coordination abnormal.   Skin: Skin is warm and dry. No rash noted.   Psychiatric: She has a normal mood and affect.   Nursing note and vitals reviewed.    Assessment/Plan   Prema was seen today for follow-up.    Diagnoses and all orders for this visit:    Essential hypertension  -     CBC & Differential; Future  -     Comprehensive Metabolic Panel  -     LDL Cholesterol, Direct; Future  -     TSH    Nausea  -     promethazine (PHENERGAN) 25 MG tablet; Take 1 tablet by mouth Every 6 (Six) Hours As Needed for Nausea. for nausea    Multiple joint pain    Other orders  -     HYDROcodone-acetaminophen (NORCO) 7.5-325 MG per tablet; Take 1 tablet by mouth Every 6 (Six) Hours As Needed for Moderate Pain .  -     lisinopril (PRINIVIL,ZESTRIL) 10 MG tablet; Take 1 tablet by mouth Daily.    The patient has read and signed the [facility] Controlled Substance Contract.  I will continue to see patient for regular follow up appointments. Patient is well controlled on the medication.  SHAYLA has been reviewed by me and is updated every 3 months. The patient is aware of the potential for addiction and dependence.     Continue with hydrocodone when needed for pain issues related to knee and joint pain.    We will switch to lisinopril for hypertension.  She had never tried this before but she  is advised if she develops cough or other side effects to contact me    Refilled Phenergan that she uses intermittently for nausea  Labs are ordered for her to get for her return visit in June        This document has been electronically signed by Olga Rojas MD on May 21, 2019 2:43 PM

## 2019-06-06 DIAGNOSIS — R11.0 NAUSEA: ICD-10-CM

## 2019-06-06 RX ORDER — PROMETHAZINE HYDROCHLORIDE 25 MG/1
TABLET ORAL
Qty: 30 TABLET | Refills: 1 | Status: SHIPPED | OUTPATIENT
Start: 2019-06-06 | End: 2019-06-20 | Stop reason: SDUPTHER

## 2019-06-18 ENCOUNTER — LAB (OUTPATIENT)
Dept: LAB | Facility: OTHER | Age: 59
End: 2019-06-18

## 2019-06-18 DIAGNOSIS — I10 ESSENTIAL HYPERTENSION: ICD-10-CM

## 2019-06-18 LAB
ALBUMIN SERPL-MCNC: 4.2 G/DL (ref 3.5–5)
ALBUMIN/GLOB SERPL: 1.2 G/DL (ref 1.1–1.8)
ALP SERPL-CCNC: 95 U/L (ref 38–126)
ALT SERPL W P-5'-P-CCNC: 32 U/L
ANION GAP SERPL CALCULATED.3IONS-SCNC: 11 MMOL/L (ref 5–15)
ARTICHOKE IGE QN: 184 MG/DL (ref 0–100)
AST SERPL-CCNC: 32 U/L (ref 14–36)
BASOPHILS # BLD AUTO: 0.03 10*3/MM3 (ref 0–0.2)
BASOPHILS NFR BLD AUTO: 0.4 % (ref 0–1.5)
BILIRUB SERPL-MCNC: 0.5 MG/DL (ref 0.2–1.3)
BUN BLD-MCNC: 17 MG/DL (ref 7–23)
BUN/CREAT SERPL: 23.6 (ref 7–25)
CALCIUM SPEC-SCNC: 9.7 MG/DL (ref 8.4–10.2)
CHLORIDE SERPL-SCNC: 105 MMOL/L (ref 101–112)
CO2 SERPL-SCNC: 28 MMOL/L (ref 22–30)
CREAT BLD-MCNC: 0.72 MG/DL (ref 0.52–1.04)
DEPRECATED RDW RBC AUTO: 44.2 FL (ref 37–54)
EOSINOPHIL # BLD AUTO: 0.29 10*3/MM3 (ref 0–0.4)
EOSINOPHIL NFR BLD AUTO: 3.5 % (ref 0.3–6.2)
ERYTHROCYTE [DISTWIDTH] IN BLOOD BY AUTOMATED COUNT: 13.6 % (ref 12.3–15.4)
GFR SERPL CREATININE-BSD FRML MDRD: 83 ML/MIN/1.73 (ref 45–104)
GLOBULIN UR ELPH-MCNC: 3.6 GM/DL (ref 2.3–3.5)
GLUCOSE BLD-MCNC: 244 MG/DL (ref 70–99)
HCT VFR BLD AUTO: 42.8 % (ref 34–46.6)
HGB BLD-MCNC: 14 G/DL (ref 12–15.9)
LYMPHOCYTES # BLD AUTO: 2.26 10*3/MM3 (ref 0.7–3.1)
LYMPHOCYTES NFR BLD AUTO: 27.4 % (ref 19.6–45.3)
MCH RBC QN AUTO: 29.7 PG (ref 26.6–33)
MCHC RBC AUTO-ENTMCNC: 32.7 G/DL (ref 31.5–35.7)
MCV RBC AUTO: 90.7 FL (ref 79–97)
MONOCYTES # BLD AUTO: 0.77 10*3/MM3 (ref 0.1–0.9)
MONOCYTES NFR BLD AUTO: 9.3 % (ref 5–12)
NEUTROPHILS # BLD AUTO: 4.9 10*3/MM3 (ref 1.7–7)
NEUTROPHILS NFR BLD AUTO: 59.4 % (ref 42.7–76)
PLATELET # BLD AUTO: 324 10*3/MM3 (ref 140–450)
PMV BLD AUTO: 10.7 FL (ref 6–12)
POTASSIUM BLD-SCNC: 4.4 MMOL/L (ref 3.4–5)
PROT SERPL-MCNC: 7.8 G/DL (ref 6.3–8.6)
RBC # BLD AUTO: 4.72 10*6/MM3 (ref 3.77–5.28)
SODIUM BLD-SCNC: 144 MMOL/L (ref 137–145)
TSH SERPL DL<=0.05 MIU/L-ACNC: 5.96 MIU/ML (ref 0.27–4.2)
WBC NRBC COR # BLD: 8.25 10*3/MM3 (ref 3.4–10.8)

## 2019-06-18 PROCEDURE — 36415 COLL VENOUS BLD VENIPUNCTURE: CPT | Performed by: FAMILY MEDICINE

## 2019-06-18 PROCEDURE — 85025 COMPLETE CBC W/AUTO DIFF WBC: CPT | Performed by: FAMILY MEDICINE

## 2019-06-18 PROCEDURE — 84443 ASSAY THYROID STIM HORMONE: CPT | Performed by: FAMILY MEDICINE

## 2019-06-18 PROCEDURE — 83721 ASSAY OF BLOOD LIPOPROTEIN: CPT | Performed by: FAMILY MEDICINE

## 2019-06-18 PROCEDURE — 80053 COMPREHEN METABOLIC PANEL: CPT | Performed by: FAMILY MEDICINE

## 2019-06-20 ENCOUNTER — OFFICE VISIT (OUTPATIENT)
Dept: FAMILY MEDICINE CLINIC | Facility: CLINIC | Age: 59
End: 2019-06-20

## 2019-06-20 VITALS
SYSTOLIC BLOOD PRESSURE: 148 MMHG | BODY MASS INDEX: 46.13 KG/M2 | HEIGHT: 60 IN | DIASTOLIC BLOOD PRESSURE: 84 MMHG | WEIGHT: 235 LBS

## 2019-06-20 DIAGNOSIS — E03.9 HYPOTHYROIDISM, UNSPECIFIED TYPE: ICD-10-CM

## 2019-06-20 DIAGNOSIS — M54.50 BILATERAL LOW BACK PAIN WITHOUT SCIATICA, UNSPECIFIED CHRONICITY: Primary | ICD-10-CM

## 2019-06-20 DIAGNOSIS — I10 ESSENTIAL HYPERTENSION: ICD-10-CM

## 2019-06-20 DIAGNOSIS — R11.0 NAUSEA: ICD-10-CM

## 2019-06-20 DIAGNOSIS — E78.01 FAMILIAL HYPERCHOLESTEROLEMIA: ICD-10-CM

## 2019-06-20 DIAGNOSIS — E11.8 TYPE 2 DIABETES MELLITUS WITH COMPLICATION, WITHOUT LONG-TERM CURRENT USE OF INSULIN (HCC): ICD-10-CM

## 2019-06-20 PROCEDURE — 99214 OFFICE O/P EST MOD 30 MIN: CPT | Performed by: FAMILY MEDICINE

## 2019-06-20 RX ORDER — LEVOTHYROXINE SODIUM 0.03 MG/1
25 TABLET ORAL DAILY
Qty: 30 TABLET | Refills: 5 | Status: SHIPPED | OUTPATIENT
Start: 2019-06-20 | End: 2019-11-19 | Stop reason: SDUPTHER

## 2019-06-20 RX ORDER — PROMETHAZINE HYDROCHLORIDE 25 MG/1
25 TABLET ORAL EVERY 6 HOURS PRN
Qty: 30 TABLET | Refills: 1 | Status: SHIPPED | OUTPATIENT
Start: 2019-06-20 | End: 2019-07-16 | Stop reason: SDUPTHER

## 2019-06-20 RX ORDER — ROSUVASTATIN CALCIUM 5 MG/1
5 TABLET, COATED ORAL DAILY
Qty: 30 TABLET | Refills: 5 | Status: SHIPPED | OUTPATIENT
Start: 2019-06-20 | End: 2019-11-19 | Stop reason: SDUPTHER

## 2019-06-20 RX ORDER — HYDROCODONE BITARTRATE AND ACETAMINOPHEN 7.5; 325 MG/1; MG/1
1 TABLET ORAL EVERY 6 HOURS PRN
Qty: 100 TABLET | Refills: 0 | Status: SHIPPED | OUTPATIENT
Start: 2019-06-20 | End: 2019-07-10 | Stop reason: SDUPTHER

## 2019-06-20 NOTE — PROGRESS NOTES
"Subjective   Prema Curtis is a 59 y.o. female.  She's here today for follow-up on joint pain, refill medications, and review recent labs.  She is been feeling tired overall and sluggish.  She says she feels \"lazy.\"  On review of her labs she has several new concerns.  She has clinical hypothyroidism with an elevated TSH and symptoms including fatigue weight gain and constipation.  Her blood sugar is 244, fasting.  We discussed diabetes today and spent some time teaching about this and the diet.  Her cholesterol is also elevated and we added medication and discuss changes in her diet for this.  She would like to refill the hydrocodone that she takes when needed for the low back pain.    History of Present Illness   The following portions of the patient's history were reviewed and updated as appropriate: allergies, current medications, past family history, past medical history, past social history, past surgical history and problem list.  Fatigue   This is a recurrent problem. The current episode started more than 1 month ago. The problem occurs constantly. The problem has been unchanged. Associated symptoms include fatigue. Pertinent negatives include no anorexia, arthralgias, change in bowel habit, chest pain, chills, congestion, coughing, diaphoresis, fever, headaches, joint swelling, myalgias, nausea, neck pain, numbness, rash, sore throat, swollen glands, urinary symptoms, vertigo, visual change, vomiting or weakness. The symptoms are aggravated by exertion. He has tried rest for the symptoms. The treatment provided mild relief.     Review of Systems   Constitutional: Negative.    HENT: Negative for postnasal drip, rhinorrhea and sneezing.    Respiratory: Negative for shortness of breath.    Cardiovascular: Negative.    Gastrointestinal: Negative.    Skin: Negative.    Allergic/Immunologic: Negative for immunocompromised state.   Neurological: Negative for dizziness, tremors, seizures and syncope. "   Hematological: Negative.    Psychiatric/Behavioral: Negative for agitation, confusion, dysphoric mood and sleep disturbance. The patient is not nervous/anxious.    All other systems reviewed and are negative.    Objective   Physical Exam   Constitutional: She is oriented to person, place, and time. She appears well-developed and well-nourished.   HENT:   Head: Normocephalic and atraumatic.   Nose: Nose normal.   Mouth/Throat: Oropharynx is clear and moist.   Eyes: Conjunctivae and EOM are normal. Pupils are equal, round, and reactive to light.   Neck: Normal range of motion. Neck supple. No JVD present. No tracheal deviation present. No thyromegaly present.   Cardiovascular: Normal rate, regular rhythm, normal heart sounds and intact distal pulses.   No murmur heard.  Pulmonary/Chest: Effort normal and breath sounds normal. She has no wheezes.   Abdominal: Soft. Bowel sounds are normal. She exhibits no distension. There is no tenderness.   Musculoskeletal: She exhibits tenderness. She exhibits no edema.   Decreased range of motion in the left shoulder in all directions with pain anteriorly   Lymphadenopathy:     She has no cervical adenopathy.   Neurological: She is alert and oriented to person, place, and time. She displays normal reflexes. Coordination abnormal.   Skin: Skin is warm and dry. No rash noted.   Psychiatric: She has a normal mood and affect.   Nursing note and vitals reviewed.    Lab on 06/18/2019   Component Date Value Ref Range Status   • LDL Cholesterol  06/18/2019 184* 0 - 100 mg/dL Final   • WBC 06/18/2019 8.25  3.40 - 10.80 10*3/mm3 Final   • RBC 06/18/2019 4.72  3.77 - 5.28 10*6/mm3 Final   • Hemoglobin 06/18/2019 14.0  12.0 - 15.9 g/dL Final   • Hematocrit 06/18/2019 42.8  34.0 - 46.6 % Final   • MCV 06/18/2019 90.7  79.0 - 97.0 fL Final   • MCH 06/18/2019 29.7  26.6 - 33.0 pg Final   • MCHC 06/18/2019 32.7  31.5 - 35.7 g/dL Final   • RDW 06/18/2019 13.6  12.3 - 15.4 % Final   • RDW-SD  06/18/2019 44.2  37.0 - 54.0 fl Final   • MPV 06/18/2019 10.7  6.0 - 12.0 fL Final   • Platelets 06/18/2019 324  140 - 450 10*3/mm3 Final   • Neutrophil % 06/18/2019 59.4  42.7 - 76.0 % Final   • Lymphocyte % 06/18/2019 27.4  19.6 - 45.3 % Final   • Monocyte % 06/18/2019 9.3  5.0 - 12.0 % Final   • Eosinophil % 06/18/2019 3.5  0.3 - 6.2 % Final   • Basophil % 06/18/2019 0.4  0.0 - 1.5 % Final   • Neutrophils, Absolute 06/18/2019 4.90  1.70 - 7.00 10*3/mm3 Final   • Lymphocytes, Absolute 06/18/2019 2.26  0.70 - 3.10 10*3/mm3 Final   • Monocytes, Absolute 06/18/2019 0.77  0.10 - 0.90 10*3/mm3 Final   • Eosinophils, Absolute 06/18/2019 0.29  0.00 - 0.40 10*3/mm3 Final   • Basophils, Absolute 06/18/2019 0.03  0.00 - 0.20 10*3/mm3 Final   Office Visit on 05/21/2019   Component Date Value Ref Range Status   • Glucose 06/18/2019 244* 70 - 99 mg/dL Final   • BUN 06/18/2019 17  7 - 23 mg/dL Final   • Creatinine 06/18/2019 0.72  0.52 - 1.04 mg/dL Final   • Sodium 06/18/2019 144  137 - 145 mmol/L Final   • Potassium 06/18/2019 4.4  3.4 - 5.0 mmol/L Final   • Chloride 06/18/2019 105  101 - 112 mmol/L Final   • CO2 06/18/2019 28.0  22.0 - 30.0 mmol/L Final   • Calcium 06/18/2019 9.7  8.4 - 10.2 mg/dL Final   • Total Protein 06/18/2019 7.8  6.3 - 8.6 g/dL Final   • Albumin 06/18/2019 4.20  3.50 - 5.00 g/dL Final   • ALT (SGPT) 06/18/2019 32  <=35 U/L Final   • AST (SGOT) 06/18/2019 32  14 - 36 U/L Final   • Alkaline Phosphatase 06/18/2019 95  38 - 126 U/L Final   • Total Bilirubin 06/18/2019 0.5  0.2 - 1.3 mg/dL Final   • eGFR Non African Amer 06/18/2019 83  45 - 104 mL/min/1.73 Final   • Globulin 06/18/2019 3.6* 2.3 - 3.5 gm/dL Final   • A/G Ratio 06/18/2019 1.2  1.1 - 1.8 g/dL Final   • BUN/Creatinine Ratio 06/18/2019 23.6  7.0 - 25.0 Final   • Anion Gap 06/18/2019 11.0  5.0 - 15.0 mmol/L Final   • TSH 06/18/2019 5.960* 0.270 - 4.200 mIU/mL Final     Assessment/Plan   Prema was seen today for med refill.    Diagnoses and all  orders for this visit:    Bilateral low back pain without sciatica, unspecified chronicity    Essential hypertension    Nausea  -     promethazine (PHENERGAN) 25 MG tablet; Take 1 tablet by mouth Every 6 (Six) Hours As Needed for Nausea. for nausea    Familial hypercholesterolemia    Hypothyroidism, unspecified type    Type 2 diabetes mellitus with complication, without long-term current use of insulin (CMS/[facility])    Other orders  -     HYDROcodone-acetaminophen (NORCO) 7.5-325 MG per tablet; Take 1 tablet by mouth Every 6 (Six) Hours As Needed for Moderate Pain .  -     rosuvastatin (CRESTOR) 5 MG tablet; Take 1 tablet by mouth Daily.  -     levothyroxine (SYNTHROID, LEVOTHROID) 25 MCG tablet; Take 1 tablet by mouth Daily.  -     Empagliflozin 25 MG tablet; Take 25 mg by mouth Every Morning Before Breakfast.    The patient has read and signed the [facility] Controlled Substance Contract.  I will continue to see patient for regular follow up appointments. Patient is well controlled on the medication.  SHAYLA has been reviewed by me and is updated every 3 months. The patient is aware of the potential for addiction and dependence.     Continue with hydrocodone when needed for pain issues related to knee and joint pain.    Spent a good deal of time today with the patient educating about diabetic diet, testing her blood sugars and recommend that she test blood sugars at least daily and when needed.  Recheck with me in 1 month.  Gave handouts on diabetic diet changes as well, low carbohydrate and will start Jardiance.    Will start Crestor for hyperlipidemia and recheck an LDL in 3 months    Will start Synthroid for symptomatic hypothyroidism and recheck a TSH in approximately 2 months              This document has been electronically signed by Olga Rojas MD on June 20, 2019 1:33 PM

## 2019-07-10 ENCOUNTER — LAB (OUTPATIENT)
Dept: LAB | Facility: OTHER | Age: 59
End: 2019-07-10

## 2019-07-10 ENCOUNTER — OFFICE VISIT (OUTPATIENT)
Dept: FAMILY MEDICINE CLINIC | Facility: CLINIC | Age: 59
End: 2019-07-10

## 2019-07-10 VITALS
DIASTOLIC BLOOD PRESSURE: 70 MMHG | HEIGHT: 60 IN | WEIGHT: 228 LBS | BODY MASS INDEX: 44.76 KG/M2 | SYSTOLIC BLOOD PRESSURE: 142 MMHG

## 2019-07-10 DIAGNOSIS — J30.89 ALLERGIC RHINITIS DUE TO OTHER ALLERGIC TRIGGER, UNSPECIFIED SEASONALITY: ICD-10-CM

## 2019-07-10 DIAGNOSIS — E11.8 TYPE 2 DIABETES MELLITUS WITH COMPLICATION, WITHOUT LONG-TERM CURRENT USE OF INSULIN (HCC): Primary | ICD-10-CM

## 2019-07-10 DIAGNOSIS — T78.40XS ALLERGIC REACTION, SEQUELA: ICD-10-CM

## 2019-07-10 DIAGNOSIS — M25.50 MULTIPLE JOINT PAIN: ICD-10-CM

## 2019-07-10 PROCEDURE — 86003 ALLG SPEC IGE CRUDE XTRC EA: CPT | Performed by: FAMILY MEDICINE

## 2019-07-10 PROCEDURE — 36415 COLL VENOUS BLD VENIPUNCTURE: CPT | Performed by: FAMILY MEDICINE

## 2019-07-10 PROCEDURE — 83036 HEMOGLOBIN GLYCOSYLATED A1C: CPT | Performed by: FAMILY MEDICINE

## 2019-07-10 PROCEDURE — 99214 OFFICE O/P EST MOD 30 MIN: CPT | Performed by: FAMILY MEDICINE

## 2019-07-10 PROCEDURE — 86008 ALLG SPEC IGE RECOMB EA: CPT | Performed by: FAMILY MEDICINE

## 2019-07-10 RX ORDER — HYDROCODONE BITARTRATE AND ACETAMINOPHEN 7.5; 325 MG/1; MG/1
1 TABLET ORAL EVERY 6 HOURS PRN
Qty: 100 TABLET | Refills: 0 | Status: SHIPPED | OUTPATIENT
Start: 2019-07-10 | End: 2019-08-22 | Stop reason: SDUPTHER

## 2019-07-10 RX ORDER — LANCETS 23 GAUGE
1 EACH MISCELLANEOUS DAILY
Qty: 100 EACH | Refills: 5 | Status: SHIPPED | OUTPATIENT
Start: 2019-07-10 | End: 2020-02-07

## 2019-07-10 NOTE — PROGRESS NOTES
Subjective   Prema Curtis is a 59 y.o. female.  She's here today for follow-up on type 2 diabetes mellitus, recently diagnosed.  She is been exceptionally compliant with the diet and has done a lot of studying on it.  She is being very careful with her foods now.  She has lost another 7 pounds.  She is testing her blood sugars every day.  She would like to refill the hydrocodone that she takes when needed for the low back pain.  She continues to have a lot of allergy symptoms and chronic rhinitis.  She feels like some foods that she eats often trigger it and would like to have an allergy test done.    Diabetes   Pertinent negatives for hypoglycemia include no confusion, dizziness, nervousness/anxiousness, seizures or tremors.      The following portions of the patient's history were reviewed and updated as appropriate: allergies, current medications, past family history, past medical history, past social history, past surgical history and problem list.      Review of Systems   Constitutional: Negative.    HENT: Negative for postnasal drip, rhinorrhea and sneezing.    Respiratory: Negative for shortness of breath.    Cardiovascular: Negative.    Gastrointestinal: Negative.    Skin: Negative.    Allergic/Immunologic: Negative for immunocompromised state.   Neurological: Negative for dizziness, tremors, seizures and syncope.   Hematological: Negative.    Psychiatric/Behavioral: Negative for agitation, confusion, dysphoric mood and sleep disturbance. The patient is not nervous/anxious.    All other systems reviewed and are negative.    Objective   Physical Exam   Constitutional: She is oriented to person, place, and time. She appears well-developed and well-nourished.   HENT:   Head: Normocephalic and atraumatic.   Nose: Nose normal.   Mouth/Throat: Oropharynx is clear and moist.   Eyes: Conjunctivae and EOM are normal. Pupils are equal, round, and reactive to light.   Neck: Normal range of motion. Neck supple.  No JVD present. No tracheal deviation present. No thyromegaly present.   Cardiovascular: Normal rate, regular rhythm, normal heart sounds and intact distal pulses.   No murmur heard.  Pulmonary/Chest: Effort normal and breath sounds normal. She has no wheezes.   Abdominal: Soft. Bowel sounds are normal. She exhibits no distension. There is no tenderness.   Musculoskeletal: She exhibits tenderness. She exhibits no edema.   Decreased range of motion in the left shoulder in all directions with pain anteriorly   Lymphadenopathy:     She has no cervical adenopathy.   Neurological: She is alert and oriented to person, place, and time. She displays normal reflexes. Coordination abnormal.   Skin: Skin is warm and dry. No rash noted.   Psychiatric: She has a normal mood and affect.   Nursing note and vitals reviewed.    Lab on 06/18/2019   Component Date Value Ref Range Status   • LDL Cholesterol  06/18/2019 184* 0 - 100 mg/dL Final   • WBC 06/18/2019 8.25  3.40 - 10.80 10*3/mm3 Final   • RBC 06/18/2019 4.72  3.77 - 5.28 10*6/mm3 Final   • Hemoglobin 06/18/2019 14.0  12.0 - 15.9 g/dL Final   • Hematocrit 06/18/2019 42.8  34.0 - 46.6 % Final   • MCV 06/18/2019 90.7  79.0 - 97.0 fL Final   • MCH 06/18/2019 29.7  26.6 - 33.0 pg Final   • MCHC 06/18/2019 32.7  31.5 - 35.7 g/dL Final   • RDW 06/18/2019 13.6  12.3 - 15.4 % Final   • RDW-SD 06/18/2019 44.2  37.0 - 54.0 fl Final   • MPV 06/18/2019 10.7  6.0 - 12.0 fL Final   • Platelets 06/18/2019 324  140 - 450 10*3/mm3 Final   • Neutrophil % 06/18/2019 59.4  42.7 - 76.0 % Final   • Lymphocyte % 06/18/2019 27.4  19.6 - 45.3 % Final   • Monocyte % 06/18/2019 9.3  5.0 - 12.0 % Final   • Eosinophil % 06/18/2019 3.5  0.3 - 6.2 % Final   • Basophil % 06/18/2019 0.4  0.0 - 1.5 % Final   • Neutrophils, Absolute 06/18/2019 4.90  1.70 - 7.00 10*3/mm3 Final   • Lymphocytes, Absolute 06/18/2019 2.26  0.70 - 3.10 10*3/mm3 Final   • Monocytes, Absolute 06/18/2019 0.77  0.10 - 0.90 10*3/mm3  Final   • Eosinophils, Absolute 06/18/2019 0.29  0.00 - 0.40 10*3/mm3 Final   • Basophils, Absolute 06/18/2019 0.03  0.00 - 0.20 10*3/mm3 Final     Assessment/Plan   Prema was seen today for diabetes.    Diagnoses and all orders for this visit:    Type 2 diabetes mellitus with complication, without long-term current use of insulin (CMS/[facility])  -     Hemoglobin A1c    Allergic reaction, sequela  -     Allergen Food Panel; Future  -     Alpha - Gal Panel; Future  -     Allergens, Zone 5; Future    Allergic rhinitis due to other allergic trigger, unspecified seasonality   -     Allergen Food Panel; Future  -     Alpha - Gal Panel; Future  -     Allergens, Zone 5; Future    Multiple joint pain    Other orders  -     Lancets 28G misc; 1 each Daily. May use brand insurance pays for  -     glucose blood test strip; Test daily may use which brand insurance will pay for  -     HYDROcodone-acetaminophen (NORCO) 7.5-325 MG per tablet; Take 1 tablet by mouth Every 6 (Six) Hours As Needed for Moderate Pain .    The patient has read and signed the [facility] Controlled Substance Contract.  I will continue to see patient for regular follow up appointments. Patient is well controlled on the medication.  SHAYLA has been reviewed by me and is updated every 3 months. The patient is aware of the potential for addiction and dependence.     Continue with hydrocodone when needed for pain issues related to knee and joint pain.    Continue with testing blood sugars daily and when needed, diet changes as above, Jardiance, recheck in 1 month again.    We will get the allergy testing as above due to the allergic reaction symptoms following food or other exposures.              This document has been electronically signed by Olga Rojas MD on July 10, 2019 3:13 PM

## 2019-07-11 LAB — HBA1C MFR BLD: 7.8 % (ref 4.8–5.6)

## 2019-07-14 LAB
A ALTERNATA IGE QN: <0.1 KU/L
A FUMIGATUS IGE QN: <0.1 KU/L
AMER ROACH IGE QN: <0.1 KU/L
BAHIA GRASS IGE QN: <0.1 KU/L
BAYBERRY POLN IGE QN: <0.1 KU/L
BERMUDA GRASS IGE QN: <0.1 KU/L
BOXELDER IGE QN: <0.1 KU/L
C HERBARUM IGE QN: <0.1 KU/L
CAT DANDER IGG QN: <0.1 KU/L
COMMON RAGWEED IGE QN: <0.1 KU/L
CONV CLASS DESCRIPTION: NORMAL
D FARINAE IGE QN: <0.1 KU/L
D PTERONYSS IGE QN: <0.1 KU/L
DOG DANDER IGE QN: <0.1 KU/L
DOG FENNEL IGE QN: <0.1 KU/L
ENGL PLANTAIN IGE QN: <0.1 KU/L
GOOSEFOOT IGE QN: <0.1 KU/L
GUM-TREE IGE QN: <0.1 KU/L
ITALIAN CYPRESS IGE QN: <0.1 KU/L
JOHNSON GRASS IGE QN: <0.1 KU/L
M RACEMOSUS IGE QN: <0.1 KU/L
P NOTATUM IGE QN: <0.1 KU/L
PEPPER TREE IGE QN: <0.1 KU/L
PER RYE GRASS IGE QN: <0.1 KU/L
QUEEN PALM IGE QN: <0.1 KU/L
S BOTRYOSUM IGE QN: <0.1 KU/L
SHEEP SORREL IGE QN: <0.1 KU/L
T210-IGE PRIVET, COMMON: <0.1 KU/L
VIRG LIVE OAK IGE QN: <0.1 KU/L
WHITE ELM IGE QN: <0.1 KU/L

## 2019-07-15 LAB
BARLEY IGE QN: <0.1 KU/L
BEEF IGE QN: 0.39 KU/L
CABBAGE IGE QN: <0.1 KU/L
CARROT IGE QN: <0.1 KU/L
CHICKEN MEAT IGE QN: <0.1 KU/L
CODFISH IGE QN: <0.1 KU/L
CONV CLASS DESCRIPTION: ABNORMAL
CORN IGE QN: <0.1 KU/L
COW MILK IGE QN: <0.1 KU/L
CRAB IGE QN: <0.1 KU/L
EGG WHITE IGE QN: <0.1 KU/L
GRAPE IGE QN: <0.1 KU/L
GREEN PEPPER IGE QN: <0.1 KU/L
LETTUCE IGE QN: <0.1 KU/L
OAT IGE QN: <0.1 KU/L
ORANGE IGE QN: <0.1 KU/L
PEANUT IGE QN: <0.1 KU/L
PORK IGE QN: 0.21 KU/L
POTATO IGE QN: <0.1 KU/L
RICE IGE QN: <0.1 KU/L
RYE IGE: <0.1 KU/L
SHRIMP IGE: <0.1 KU/L
SOYBEAN IGE QN: <0.1 KU/L
TOMATO IGE QN: <0.1 KU/L
TUNA IGE QN: <0.1 KU/L
WHEAT IGE QN: <0.1 KU/L
WHITE BEAN IGE QN: <0.1 KU/L

## 2019-07-15 NOTE — PROGRESS NOTES
Please call the patient regarding her abnormal result.  Tell her the pork and beef allergies are the only ones that showed up.  No other allergies are seen.

## 2019-07-16 DIAGNOSIS — R11.0 NAUSEA: ICD-10-CM

## 2019-07-16 LAB
ALPHA GAL IGE: 1.69 KU/L
BEEF IGE QN: 0.52 KU/L
LAMB IGE QN: <0.1 KU/L
Lab: 0
Lab: 1
Lab: ABNORMAL
PORK IGE: 0.24 KU/L

## 2019-07-16 RX ORDER — PROMETHAZINE HYDROCHLORIDE 25 MG/1
TABLET ORAL
Qty: 30 TABLET | Refills: 1 | Status: SHIPPED | OUTPATIENT
Start: 2019-07-16 | End: 2019-08-22 | Stop reason: SDUPTHER

## 2019-07-16 RX ORDER — EPINEPHRINE 0.3 MG/.3ML
0.3 INJECTION SUBCUTANEOUS ONCE
Qty: 1 EACH | Refills: 0 | Status: SHIPPED | OUTPATIENT
Start: 2019-07-16 | End: 2019-07-16

## 2019-07-16 NOTE — PROGRESS NOTES
Please call the patient regarding her abnormal result.  She does have alpha gal.  Needs EpiPen's called in.  I think it would probably be best to see if she can just come in and we can sit down with her to explain what to eat, etc.

## 2019-07-22 ENCOUNTER — OFFICE VISIT (OUTPATIENT)
Dept: FAMILY MEDICINE CLINIC | Facility: CLINIC | Age: 59
End: 2019-07-22

## 2019-07-22 VITALS
SYSTOLIC BLOOD PRESSURE: 126 MMHG | BODY MASS INDEX: 44.17 KG/M2 | HEIGHT: 60 IN | WEIGHT: 225 LBS | DIASTOLIC BLOOD PRESSURE: 62 MMHG

## 2019-07-22 DIAGNOSIS — Z91.018 ALLERGY TO GALACTOSE-ALPHA-1,3-GALACTOSE: Primary | ICD-10-CM

## 2019-07-22 DIAGNOSIS — E11.8 TYPE 2 DIABETES MELLITUS WITH COMPLICATION, WITHOUT LONG-TERM CURRENT USE OF INSULIN (HCC): ICD-10-CM

## 2019-07-22 PROCEDURE — 99213 OFFICE O/P EST LOW 20 MIN: CPT | Performed by: FAMILY MEDICINE

## 2019-07-22 NOTE — PROGRESS NOTES
Subjective   Prema Curtis is a 59 y.o. female.  She's here today for follow-up on on some allergies.  She was recently found to have alpha gal syndrome.  She has noticed rhinorrhea and respiratory congestion/coughing with dairy products.  She tested negative for dairy allergy but still seems symptomatic.  She is doing very well with her diabetic diet and her blood sugars are running around 1 41-1 48 most of the time, fasting.    History of Present Illness   The following portions of the patient's history were reviewed and updated as appropriate: allergies, current medications, past family history, past medical history, past social history, past surgical history and problem list.      Review of Systems   Constitutional: Negative.    HENT: Positive for congestion and rhinorrhea. Negative for postnasal drip and sneezing.    Respiratory: Negative for shortness of breath.    Cardiovascular: Negative.    Gastrointestinal: Negative.    Musculoskeletal: Positive for arthralgias.   Skin: Positive for rash.   Allergic/Immunologic: Positive for food allergies. Negative for immunocompromised state.   Neurological: Negative for syncope.   Hematological: Negative.    Psychiatric/Behavioral: Negative for agitation, dysphoric mood and sleep disturbance.   All other systems reviewed and are negative.    Objective   Physical Exam   Constitutional: She is oriented to person, place, and time. She appears well-developed and well-nourished.   HENT:   Head: Normocephalic and atraumatic.   Nose: Nose normal.   Mouth/Throat: Oropharynx is clear and moist.   Eyes: Conjunctivae and EOM are normal. Pupils are equal, round, and reactive to light.   Neck: Normal range of motion. Neck supple. No JVD present. No tracheal deviation present. No thyromegaly present.   Cardiovascular: Normal rate, regular rhythm, normal heart sounds and intact distal pulses.   No murmur heard.  Pulmonary/Chest: Effort normal and breath sounds normal. She has no  wheezes.   Abdominal: Soft. Bowel sounds are normal. She exhibits no distension. There is no tenderness.   Musculoskeletal: She exhibits tenderness. She exhibits no edema.   Decreased range of motion in the left shoulder in all directions with pain anteriorly   Lymphadenopathy:     She has no cervical adenopathy.   Neurological: She is alert and oriented to person, place, and time. She displays normal reflexes. Coordination abnormal.   Skin: Skin is warm and dry. No rash noted.   Psychiatric: She has a normal mood and affect.   Nursing note and vitals reviewed.    Lab on 07/10/2019   Component Date Value Ref Range Status   • Class Description 07/10/2019 Comment   Final        Levels of Specific IgE       Class  Description of Class      ---------------------------  -----  --------------------                     < 0.10         0         Negative             0.10 -    0.31         0/I       Equivocal/Low             0.32 -    0.55         I         Low             0.56 -    1.40         II        Moderate             1.41 -    3.90         III       High             3.91 -   19.00         IV        Very High            19.01 -  100.00         V         Very High                    >100.00         VI        Very High   • Egg White 07/10/2019 <0.10  Class 0 kU/L Final   • Milk, Cow's 07/10/2019 <0.10  Class 0 kU/L Final   • CodFish 07/10/2019 <0.10  Class 0 kU/L Final   • Wheat 07/10/2019 <0.10  Class 0 kU/L Final   • Rye 07/10/2019 <0.10  Class 0 kU/L Final   • Barley 07/10/2019 <0.10  Class 0 kU/L Final   • Oats 07/10/2019 <0.10  Class 0 kU/L Final   • Corn 07/10/2019 <0.10  Class 0 kU/L Final   • Rice 07/10/2019 <0.10  Class 0 kU/L Final   • Peanut 07/10/2019 <0.10  Class 0 kU/L Final   • Soybean 07/10/2019 <0.10  Class 0 kU/L Final   • White Bean 07/10/2019 <0.10  Class 0 kU/L Final   • Crab 07/10/2019 <0.10  Class 0 kU/L Final   • Shrimp 07/10/2019 <0.10  Class 0 kU/L Final   • Tomato 07/10/2019 <0.10  Class 0  kU/L Final   • Pork 07/10/2019 0.21* Class 0/I kU/L Final   • Beef 07/10/2019 0.39* Class I kU/L Final   • Carrot 07/10/2019 <0.10  Class 0 kU/L Final   • Orange 07/10/2019 <0.10  Class 0 kU/L Final   • Potato 07/10/2019 <0.10  Class 0 kU/L Final   • Tuna 07/10/2019 <0.10  Class 0 kU/L Final   • Chicken 07/10/2019 <0.10  Class 0 kU/L Final   • Green Bell Pepper 07/10/2019 <0.10  Class 0 kU/L Final   • Lettuce 07/10/2019 <0.10  Class 0 kU/L Final   • Cabbage 07/10/2019 <0.10  Class 0 kU/L Final   • Grape 07/10/2019 <0.10  Class 0 kU/L Final   • Beef 07/10/2019 0.52* <0.35 kU/L Final   • Class Description 07/10/2019 1   Final   • Saleem 07/10/2019 <0.10  <0.35 kU/L Final   • Class Interpretation 07/10/2019 0   Final   • Pork 07/10/2019 0.24  <0.35 kU/L Final   • Class Interpretation 07/10/2019 0/1   Final    The test method is the [employer] ImmunoCAP allergen-specific  IgE system. CLASS INTERPRETATION   <0.10 kU/L= 0,  Negative; 0.10 - 0.34 kU/L= 0/1, Equivocal/Borderline;  0.35 - 0.69  kU/L=1, Low Positive; 0.70 - 3.49 kU/L=2,  Moderate Positive;  3.50  - 17.49 kU/L=3, High Positive;  17.50 - 49.99 kU/L= 4, Very High Positive; 50.00 - 99.99  kU/L= 5, Very High Positive;   >99.99 kU/L=6, Very High  Positive  *This test was developed and its performance  characteristics determined by [employer]. It has not  been cleared or approved by the [place]. Food and Drug  Administration.   • Alpha Gal IgE 07/10/2019 1.69* <0.10 kU/L Final    Previous reports (RICHARD 2009;[id number]) have demonstrated  that patients with IgE antibodies to  kqgyocpla-t-6,3-galactose are at risk for delayed  anaphylaxis, angioedema, or urticaria following  consumption of beef, pork, or lamb.   • Class Description 07/10/2019 Comment   Final        Levels of Specific IgE       Class  Description of Class      ---------------------------  -----  --------------------                     < 0.10         0         Negative             0.10 -    0.31          0/I       Equivocal/Low             0.32 -    0.55         I         Low             0.56 -    1.40         II        Moderate             1.41 -    3.90         III       High             3.91 -   19.00         IV        Very High            19.01 -  100.00         V         Very High                    >100.00         VI        Very High   • D. pteronyssinus (dust mite) 07/10/2019 <0.10  Class 0 kU/L Final   • D. farinae (dust mite) 07/10/2019 <0.10  Class 0 kU/L Final   • Cat Dander 07/10/2019 <0.10  Class 0 kU/L Final   • Dog Dander, IgE 07/10/2019 <0.10  Class 0 kU/L Final   • [place] Grass 07/10/2019 <0.10  Class 0 kU/L Final   • [place], Perennial 07/10/2019 <0.10  Class 0 kU/L Final   • [place]he Grass 07/10/2019 <0.10  Class 0 kU/L Final   • [place] Grass 07/10/2019 <0.10  Class 0 kU/L Final   • Cockroach, American 07/10/2019 <0.10  Class 0 kU/L Final   • Penicillium chrysogen 07/10/2019 <0.10  Class 0 kU/L Final   • Cladosporium herbarum 07/10/2019 <0.10  Class 0 kU/L Final   • Aspergillus fumigatus 07/10/2019 <0.10  Class 0 kU/L Final   • Mucor racemosus 07/10/2019 <0.10  Class 0 kU/L Final   • Alternaria alternata 07/10/2019 <0.10  Class 0 kU/L Final   • Stemphylium herbarum 07/10/2019 <0.10  Class 0 kU/L Final   • Elm, American 07/10/2019 <0.10  Class 0 kU/L Final   • Eucalyptus 07/10/2019 <0.10  Class 0 kU/L Final   • Maple/[place] 07/10/2019 <0.10  Class 0 kU/L Final   • [place], Italian 07/10/2019 <0.10  Class 0 kU/L Final   • Pepper Tree 07/10/2019 <0.10  Class 0 kU/L Final   • [place], Live/[place] 07/10/2019 <0.10  Class 0 kU/L Final   • Privet, Common 07/10/2019 <0.10  Class 0 kU/L Final   • Bayberry 07/10/2019 <0.10  Class 0 kU/L Final   • Pickard Palm 07/10/2019 <0.10  Class 0 kU/L Final   • Ragweed, Common/Short 07/10/2019 <0.10  Class 0 kU/L Final   • English Plantain 07/10/2019 <0.10  Class 0 kU/L Final   • Lamb's Quarter 07/10/2019 <0.10  Class 0 kU/L Final   • Sheep Sorrel 07/10/2019 <0.10  Class 0  kU/L Final   • Dog Fennel 07/10/2019 <0.10  Class 0 kU/L Final   Office Visit on 07/10/2019   Component Date Value Ref Range Status   • Hemoglobin A1C 07/10/2019 7.80* 4.80 - 5.60 % Final     Assessment/Plan   Prema was seen today for follow-up.    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  Discussed food preparation at home and in restaurants.  Gave a handout with information to her to take home and study on as well.  Recheck with me in 1 month.    Continue to test blood sugars daily and when needed and continue with diabetic diet and weight loss efforts.              This document has been electronically signed by Olga Rojas MD on July 22, 2019 9:34 AM

## 2019-08-22 ENCOUNTER — OFFICE VISIT (OUTPATIENT)
Dept: FAMILY MEDICINE CLINIC | Facility: CLINIC | Age: 59
End: 2019-08-22

## 2019-08-22 VITALS
SYSTOLIC BLOOD PRESSURE: 158 MMHG | BODY MASS INDEX: 43.35 KG/M2 | DIASTOLIC BLOOD PRESSURE: 72 MMHG | HEIGHT: 60 IN | WEIGHT: 220.8 LBS

## 2019-08-22 DIAGNOSIS — E11.8 TYPE 2 DIABETES MELLITUS WITH COMPLICATION, WITHOUT LONG-TERM CURRENT USE OF INSULIN (HCC): Primary | ICD-10-CM

## 2019-08-22 DIAGNOSIS — Z91.018 ALLERGY TO GALACTOSE-ALPHA-1,3-GALACTOSE: ICD-10-CM

## 2019-08-22 DIAGNOSIS — R11.0 NAUSEA: ICD-10-CM

## 2019-08-22 DIAGNOSIS — I10 ESSENTIAL HYPERTENSION: ICD-10-CM

## 2019-08-22 PROCEDURE — 99214 OFFICE O/P EST MOD 30 MIN: CPT | Performed by: FAMILY MEDICINE

## 2019-08-22 RX ORDER — LISINOPRIL 10 MG/1
10 TABLET ORAL DAILY
Qty: 30 TABLET | Refills: 5 | Status: SHIPPED | OUTPATIENT
Start: 2019-08-22 | End: 2019-08-22 | Stop reason: SDUPTHER

## 2019-08-22 RX ORDER — HYDROCODONE BITARTRATE AND ACETAMINOPHEN 7.5; 325 MG/1; MG/1
1 TABLET ORAL EVERY 6 HOURS PRN
Qty: 100 TABLET | Refills: 0 | Status: SHIPPED | OUTPATIENT
Start: 2019-08-22 | End: 2019-09-23 | Stop reason: SDUPTHER

## 2019-08-22 RX ORDER — PROMETHAZINE HYDROCHLORIDE 25 MG/1
25 TABLET ORAL EVERY 6 HOURS PRN
Qty: 30 TABLET | Refills: 1 | Status: SHIPPED | OUTPATIENT
Start: 2019-08-22 | End: 2019-09-23 | Stop reason: SDUPTHER

## 2019-08-22 RX ORDER — LISINOPRIL 10 MG/1
10 TABLET ORAL DAILY
Qty: 30 TABLET | Refills: 5 | Status: SHIPPED | OUTPATIENT
Start: 2019-08-22 | End: 2020-10-26

## 2019-08-22 RX ORDER — HYDROCODONE BITARTRATE AND ACETAMINOPHEN 7.5; 325 MG/1; MG/1
1 TABLET ORAL EVERY 6 HOURS PRN
Qty: 100 TABLET | Refills: 0 | Status: SHIPPED | OUTPATIENT
Start: 2019-08-22 | End: 2019-08-22 | Stop reason: SDUPTHER

## 2019-08-22 RX ORDER — PROMETHAZINE HYDROCHLORIDE 25 MG/1
25 TABLET ORAL EVERY 6 HOURS PRN
Qty: 30 TABLET | Refills: 1 | Status: SHIPPED | OUTPATIENT
Start: 2019-08-22 | End: 2019-08-22 | Stop reason: SDUPTHER

## 2019-08-22 NOTE — PROGRESS NOTES
Subjective   Prema Curtis is a 59 y.o. female.  She's here today for follow-up on on newly diagnosed type 2 diabetes and some allergy issues.  She has drastically changed her diet due to the alpha gal diet and the diabetes.  She is continuing to lose weight.  She is down 15 pounds in the last month.  She is being very compliant with diet and testing her sugars every day.  They rarely run over 115.  She is doing an excellent job overall with her diet.  She is feeling better and with the weight loss is noted sleeping a bit of less pain in her back and joints.  She would like to have a refill of her hydrocodone to take however when needed    History of Present Illness   The following portions of the patient's history were reviewed and updated as appropriate: allergies, current medications, past family history, past medical history, past social history, past surgical history and problem list.      Review of Systems   Constitutional: Negative.    HENT: Positive for congestion and rhinorrhea. Negative for postnasal drip and sneezing.    Respiratory: Negative for shortness of breath.    Cardiovascular: Negative.    Gastrointestinal: Negative.    Musculoskeletal: Positive for arthralgias.   Skin: Positive for rash.   Allergic/Immunologic: Positive for food allergies. Negative for immunocompromised state.   Neurological: Negative for syncope.   Hematological: Negative.    Psychiatric/Behavioral: Negative for agitation, dysphoric mood and sleep disturbance.   All other systems reviewed and are negative.    Objective   Physical Exam   Constitutional: She is oriented to person, place, and time. She appears well-developed and well-nourished.   HENT:   Head: Normocephalic and atraumatic.   Nose: Nose normal.   Mouth/Throat: Oropharynx is clear and moist.   Eyes: Conjunctivae and EOM are normal. Pupils are equal, round, and reactive to light.   Neck: Normal range of motion. Neck supple. No JVD present. No tracheal deviation  present. No thyromegaly present.   Cardiovascular: Normal rate, regular rhythm, normal heart sounds and intact distal pulses.   No murmur heard.  Pulmonary/Chest: Effort normal and breath sounds normal. She has no wheezes.   Abdominal: Soft. Bowel sounds are normal. She exhibits no distension. There is no tenderness.   Musculoskeletal: She exhibits tenderness. She exhibits no edema.   Decreased range of motion in the left shoulder in all directions with pain anteriorly   Lymphadenopathy:     She has no cervical adenopathy.   Neurological: She is alert and oriented to person, place, and time. She displays normal reflexes. Coordination abnormal.   Skin: Skin is warm and dry. No rash noted.   Psychiatric: She has a normal mood and affect.   Nursing note and vitals reviewed.      Assessment/Plan   Prema was seen today for follow-up.    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 for nausea  -     promethazine (PHENERGAN) 25 MG tablet; Take 1 tablet by mouth Every 6 (Six) Hours As Needed for Nausea. for nausea    Other orders  -     Discontinue: HYDROcodone-acetaminophen (NORCO) 7.5-325 MG per tablet; Take 1 tablet by mouth Every 6 (Six) Hours As Needed for Moderate Pain .  -     Discontinue: lisinopril (PRINIVIL,ZESTRIL) 10 MG tablet; Take 1 tablet by mouth Daily.  -     HYDROcodone-acetaminophen (NORCO) 7.5-325 MG per tablet; Take 1 tablet by mouth Every 6 (Six) Hours As Needed for Moderate Pain .  -     lisinopril (PRINIVIL,ZESTRIL) 10 MG tablet; Take 1 tablet by mouth Daily.    Continue to strictly follow the mammal free diet.  She did not test positive for cows milk allergy though and is advised that she may drink this.   He had questions about this.    Continue hydrocodone for joint pain when needed    Refilled lisinopril.  Blood pressures a bit elevated today but has not been running this high usually at home.  Phenergan given to have at home for when she has nausea.    Continue to test blood sugars daily and when needed and continue with diabetic diet and weight loss efforts.  She is commended on her tremendous efforts.              This document has been electronically signed by Olga Rojas MD on August 22, 2019 2:29 PM

## 2019-09-23 DIAGNOSIS — R11.0 NAUSEA: ICD-10-CM

## 2019-09-23 RX ORDER — PROMETHAZINE HYDROCHLORIDE 25 MG/1
25 TABLET ORAL EVERY 6 HOURS PRN
Qty: 30 TABLET | Refills: 1 | Status: SHIPPED | OUTPATIENT
Start: 2019-09-23 | End: 2019-10-22 | Stop reason: SDUPTHER

## 2019-09-23 RX ORDER — HYDROCODONE BITARTRATE AND ACETAMINOPHEN 7.5; 325 MG/1; MG/1
1 TABLET ORAL EVERY 6 HOURS PRN
Qty: 100 TABLET | Refills: 0 | Status: SHIPPED | OUTPATIENT
Start: 2019-09-23 | End: 2019-10-22 | Stop reason: SDUPTHER

## 2019-10-04 DIAGNOSIS — R11.0 NAUSEA: ICD-10-CM

## 2019-10-04 RX ORDER — PROMETHAZINE HYDROCHLORIDE 25 MG/1
TABLET ORAL
Qty: 30 TABLET | Refills: 1 | Status: SHIPPED | OUTPATIENT
Start: 2019-10-04 | End: 2019-10-22

## 2019-10-22 ENCOUNTER — OFFICE VISIT (OUTPATIENT)
Dept: FAMILY MEDICINE CLINIC | Facility: CLINIC | Age: 59
End: 2019-10-22

## 2019-10-22 VITALS
BODY MASS INDEX: 42.8 KG/M2 | DIASTOLIC BLOOD PRESSURE: 82 MMHG | HEIGHT: 60 IN | WEIGHT: 218 LBS | SYSTOLIC BLOOD PRESSURE: 132 MMHG

## 2019-10-22 DIAGNOSIS — R11.0 NAUSEA: ICD-10-CM

## 2019-10-22 DIAGNOSIS — E11.8 TYPE 2 DIABETES MELLITUS WITH COMPLICATION, WITHOUT LONG-TERM CURRENT USE OF INSULIN (HCC): Primary | ICD-10-CM

## 2019-10-22 DIAGNOSIS — E66.01 MORBID OBESITY, UNSPECIFIED OBESITY TYPE (HCC): ICD-10-CM

## 2019-10-22 DIAGNOSIS — Z00.00 INITIAL MEDICARE ANNUAL WELLNESS VISIT: ICD-10-CM

## 2019-10-22 DIAGNOSIS — Z91.018 ALLERGY TO GALACTOSE-ALPHA-1,3-GALACTOSE: ICD-10-CM

## 2019-10-22 DIAGNOSIS — L20.89 OTHER ATOPIC DERMATITIS: ICD-10-CM

## 2019-10-22 DIAGNOSIS — I10 ESSENTIAL HYPERTENSION: ICD-10-CM

## 2019-10-22 PROCEDURE — 99213 OFFICE O/P EST LOW 20 MIN: CPT | Performed by: FAMILY MEDICINE

## 2019-10-22 PROCEDURE — G0438 PPPS, INITIAL VISIT: HCPCS | Performed by: FAMILY MEDICINE

## 2019-10-22 RX ORDER — HYDROCODONE BITARTRATE AND ACETAMINOPHEN 7.5; 325 MG/1; MG/1
1 TABLET ORAL EVERY 6 HOURS PRN
Qty: 100 TABLET | Refills: 0 | Status: SHIPPED | OUTPATIENT
Start: 2019-10-22 | End: 2019-11-19 | Stop reason: SDUPTHER

## 2019-10-22 RX ORDER — PROMETHAZINE HYDROCHLORIDE 25 MG/1
25 TABLET ORAL EVERY 6 HOURS PRN
Qty: 30 TABLET | Refills: 1 | Status: SHIPPED | OUTPATIENT
Start: 2019-10-22 | End: 2019-11-19 | Stop reason: SDUPTHER

## 2019-10-22 NOTE — PROGRESS NOTES
"Subjective   Prema Curtis is a 59 y.o. female.  She's here today for follow-up on on newly diagnosed type 2 diabetes, hypertension.  She has been exemplary as far as following recommendations.  She still losing weight and eating as healthy as she can.  She has cut out carbohydrates and is doing a good job with grocery shopping and avoiding sugars.  She does feel better but would like to have a refill of her pain medicine to take when needed if she is not a good NSAID candidate.    History of Present Illness   The following portions of the patient's history were reviewed and updated as appropriate: allergies, current medications, past family history, past medical history, past social history, past surgical history and problem list.      Review of Systems   Constitutional: Negative.    HENT: Negative for postnasal drip and sneezing.    Respiratory: Negative for shortness of breath.    Cardiovascular: Negative.    Gastrointestinal: Negative.    Musculoskeletal: Positive for arthralgias.   Skin: Positive for rash.   Allergic/Immunologic: Positive for food allergies. Negative for immunocompromised state.   Neurological: Negative for syncope.   Hematological: Negative.    Psychiatric/Behavioral: Negative for agitation, dysphoric mood and sleep disturbance.   All other systems reviewed and are negative.    Objective    Vitals:    10/22/19 1412   BP: 132/82   Weight: 98.9 kg (218 lb)   Height: 152.4 cm (60\")   PainSc:   8   PainLoc: Back     Physical Exam   Constitutional: She is oriented to person, place, and time. She appears well-developed and well-nourished.   HENT:   Head: Normocephalic and atraumatic.   Nose: Nose normal.   Mouth/Throat: Oropharynx is clear and moist.   Eyes: Conjunctivae and EOM are normal. Pupils are equal, round, and reactive to light.   Neck: Normal range of motion. Neck supple. No JVD present. No tracheal deviation present. No thyromegaly present.   Cardiovascular: Normal rate, regular " rhythm, normal heart sounds and intact distal pulses.   No murmur heard.  Pulmonary/Chest: Effort normal and breath sounds normal. She has no wheezes.   Abdominal: Soft. Bowel sounds are normal. She exhibits no distension. There is no tenderness.   Musculoskeletal: She exhibits tenderness. She exhibits no edema.   Decreased range of motion in the left shoulder in all directions with pain anteriorly   Lymphadenopathy:     She has no cervical adenopathy.   Neurological: She is alert and oriented to person, place, and time. She displays normal reflexes. Coordination abnormal.   Skin: Skin is warm and dry. No rash noted.   Psychiatric: She has a normal mood and affect.   Nursing note and vitals reviewed.      Assessment/Plan   Continue hydrocodone for joint pain when needed  The patient has read and signed the [facility] Controlled Substance Contract.  I will continue to see patient for regular follow up appointments. Patient is well controlled on the medication.  SHAYLA has been reviewed by me and is updated every 3 months. The patient is aware of the potential for addiction and dependence.     Continue lisinopril for hypertension and Jardiance for diabetes.    Continue to test blood sugars daily and when needed and continue with diabetic diet and weight loss efforts.  She is commended on her tremendous efforts.            Prema was seen today for follow-up.    Diagnoses and all orders for this visit:    Type 2 diabetes mellitus with complication, without long-term current use of insulin (CMS/[facility])    Nausea  -     promethazine (PHENERGAN) 25 MG tablet; Take 1 tablet by mouth Every 6 (Six) Hours As Needed for Nausea. for nausea    Essential hypertension    Other orders  -     HYDROcodone-acetaminophen (NORCO) 7.5-325 MG per tablet; Take 1 tablet by mouth Every 6 (Six) Hours As Needed for Moderate Pain .  -     Empagliflozin 25 MG tablet; Take 25 mg by mouth Every Morning Before Breakfast.  -     glucose blood test  strip; Test daily and Prn. EZ Contour Next strips.          This document has been electronically signed by Olga Rojas MD on October 22, 2019 2:44 PM

## 2019-10-22 NOTE — PROGRESS NOTES
The ABCs of the Annual Wellness Visit  Initial Medicare Wellness Visit    Chief Complaint   Patient presents with   • Follow-up     2 month follow up & medicare visit.       Subjective   History of Present Illness:  Prema Curtis is a 59 y.o. female who presents for an Initial Medicare Wellness Visit.    HEALTH RISK ASSESSMENT    Recent Hospitalizations:  No hospitalization(s) within the last year.    Current Medical Providers:  Patient Care Team:  Olga Rojas MD as PCP - General    Smoking Status:  Social History     Tobacco Use   Smoking Status Never Smoker   Smokeless Tobacco Never Used       Alcohol Consumption:  Social History     Substance and Sexual Activity   Alcohol Use No       Depression Screen:   PHQ-2/PHQ-9 Depression Screening 10/22/2019   Little interest or pleasure in doing things 1   Feeling down, depressed, or hopeless 1   Trouble falling or staying asleep, or sleeping too much 0   Feeling tired or having little energy 1   Poor appetite or overeating 0   Feeling bad about yourself - or that you are a failure or have let yourself or your family down 0   Trouble concentrating on things, such as reading the newspaper or watching television 0   Moving or speaking so slowly that other people could have noticed. Or the opposite - being so fidgety or restless that you have been moving around a lot more than usual 0   Thoughts that you would be better off dead, or of hurting yourself in some way 0   Total Score 3   If you checked off any problems, how difficult have these problems made it for you to do your work, take care of things at home, or get along with other people? Somewhat difficult       Fall Risk Screen:  STEADI Fall Risk Assessment has not been completed.    Health Habits and Functional and Cognitive Screening:  No flowsheet data found.      Does the patient have evidence of cognitive impairment? No    Asprin use counseling:Does not need ASA (and currently is not on  it)    Age-appropriate Screening Schedule:  Refer to the list below for future screening recommendations based on patient's age, sex and/or medical conditions. Orders for these recommended tests are listed in the plan section. The patient has been provided with a written plan.    Health Maintenance   Topic Date Due   • PNEUMOCOCCAL VACCINE (19-64 MEDIUM RISK) (1 of 1 - PPSV23) 10/22/2019 (Originally 2/17/1979)   • URINE MICROALBUMIN  11/09/2019 (Originally 1960)   • ZOSTER VACCINE (1 of 2) 09/23/2024 (Originally 2/17/2009)   • HEMOGLOBIN A1C  01/10/2020   • DIABETIC EYE EXAM  02/19/2020   • LIPID PANEL  06/18/2020   • PAP SMEAR  06/22/2020   • DIABETIC FOOT EXAM  10/22/2020   • MAMMOGRAM  10/14/2021   • COLONOSCOPY  03/21/2027   • TDAP/TD VACCINES (3 - Td) 08/22/2028   • INFLUENZA VACCINE  Addressed          The following portions of the patient's history were reviewed and updated as appropriate: allergies, current medications, past family history, past medical history, past social history, past surgical history and problem list.    Outpatient Medications Prior to Visit   Medication Sig Dispense Refill   • albuterol sulfate HFA (PROAIR HFA) 108 (90 Base) MCG/ACT inhaler Inhale 2 puffs Every 6 (Six) Hours As Needed for Wheezing or Shortness of Air. 8 g 5   • azelastine (ASTELIN) 0.1 % nasal spray 2 sprays 2 (Two) Times a Day.  11   • clotrimazole-betamethasone (LOTRISONE) 1-0.05 % lotion use bid as directed     • cyclobenzaprine (FLEXERIL) 10 MG tablet   0   • ibuprofen (ADVIL,MOTRIN) 800 MG tablet Take 1 tablet by mouth 3 (Three) Times a Day As Needed for Mild Pain . 90 tablet 5   • Lancets 28G misc 1 each Daily. May use brand insurance pays for 100 each 5   • levothyroxine (SYNTHROID, LEVOTHROID) 25 MCG tablet Take 1 tablet by mouth Daily. 30 tablet 5   • lisinopril (PRINIVIL,ZESTRIL) 10 MG tablet Take 1 tablet by mouth Daily. 30 tablet 5   • montelukast (SINGULAIR) 10 MG tablet 1 tablet daily 30 tablet 5   •  mupirocin (BACTROBAN) 2 % ointment Apply  topically to the appropriate area as directed 3 (Three) Times a Day. 30 g 5   • rosuvastatin (CRESTOR) 5 MG tablet Take 1 tablet by mouth Daily. 30 tablet 5   • Empagliflozin 25 MG tablet Take 25 mg by mouth Every Morning Before Breakfast. 30 tablet 5   • glucose blood test strip Test daily and Prn. EZ Contour Next strips. 50 each 5   • HYDROcodone-acetaminophen (NORCO) 7.5-325 MG per tablet Take 1 tablet by mouth Every 6 (Six) Hours As Needed for Moderate Pain . 100 tablet 0   • promethazine (PHENERGAN) 25 MG tablet Take 1 tablet by mouth Every 6 (Six) Hours As Needed for Nausea. for nausea 30 tablet 1   • promethazine (PHENERGAN) 25 MG tablet TAKE ONE TABLET BY MOUTH EVERY 6 HOURS AS NEEDED FOR NAUSEA 30 tablet 1     No facility-administered medications prior to visit.        Patient Active Problem List   Diagnosis   • Allergic reaction   • Vitamin B12 deficiency (non anemic)   • Tinea corporis   • Tenderness over spine   • Superficial injury of breast without infection   • Skin rash   • Psoriasis of scalp   • Plantar fasciitis   • Obesity   • Myalgia   • Knee pain   • Hypertensive disorder   • Hidradenitis suppurativa   • H/O mammogram   • VETO (generalized anxiety disorder)   • Foot pain   • Fatigue   • Essential hypertension   • Enlarged lymph nodes   • Drug therapy   • Cutaneous abscess   • Community acquired pneumonia   • Anxious mood   • Ankle joint pain   • Chronic pain of right knee   • Multiple joint pain   • Presence of right artificial knee joint   • Bilateral low back pain without sciatica   • Type 2 diabetes mellitus with complication, without long-term current use of insulin (CMS/[facility])       Advanced Care Planning:  Patient does not have an advance directive - not interested in additional information    Review of Systems   Constitutional: Negative.    HENT: Negative for postnasal drip and sneezing.    Respiratory: Negative for shortness of breath.   "  Cardiovascular: Negative.    Gastrointestinal: Negative.    Musculoskeletal: Positive for arthralgias.   Skin: Positive for rash.   Allergic/Immunologic: Positive for food allergies. Negative for immunocompromised state.   Neurological: Negative for syncope.   Hematological: Negative.    Psychiatric/Behavioral: Negative for agitation, dysphoric mood and sleep disturbance.   All other systems reviewed and are negative.      Compared to one year ago, the patient feels her physical health is better.  Compared to one year ago, the patient feels her mental health is better.    Reviewed chart for potential of high risk medication in the elderly: yes  Reviewed chart for potential of harmful drug interactions in the elderly:yes    Objective         Vitals:    10/22/19 1412   BP: 132/82   Weight: 98.9 kg (218 lb)   Height: 152.4 cm (60\")   PainSc:   8   PainLoc: Back       Body mass index is 42.58 kg/m².  Discussed the patient's BMI with her. The BMI is above average; BMI management plan is completed.    Physical Exam   Constitutional: She is oriented to person, place, and time. She appears well-developed and well-nourished.   HENT:   Head: Normocephalic and atraumatic.   Nose: Nose normal.   Mouth/Throat: Oropharynx is clear and moist.   Eyes: Conjunctivae and EOM are normal. Pupils are equal, round, and reactive to light.   Neck: Normal range of motion. Neck supple. No JVD present. No tracheal deviation present. No thyromegaly present.   Cardiovascular: Normal rate, regular rhythm, normal heart sounds and intact distal pulses.   No murmur heard.  Pulmonary/Chest: Effort normal and breath sounds normal. She has no wheezes.   Abdominal: Soft. Bowel sounds are normal. She exhibits no distension. There is no tenderness.   Musculoskeletal: She exhibits tenderness. She exhibits no edema.   Decreased range of motion in the left shoulder in all directions with pain anteriorly   Lymphadenopathy:     She has no cervical " adenopathy.   Neurological: She is alert and oriented to person, place, and time. She displays normal reflexes. Coordination abnormal.   Skin: Skin is warm and dry. No rash noted.   Psychiatric: She has a normal mood and affect.   Nursing note and vitals reviewed.            Assessment/Plan   Medicare Risks and Personalized Health Plan  CMS Preventative Services Quick Reference  Diabetic Lab Screening   Obesity/Overweight     The above risks/problems have been discussed with the patient.  Pertinent information has been shared with the patient in the After Visit Summary.  Follow up plans and orders are seen below in the Assessment/Plan Section.    Diagnoses and all orders for this visit:    1. Type 2 diabetes mellitus with complication, without long-term current use of insulin (CMS/[facility]) (Primary)  -     CBC & Differential; Future  -     Comprehensive Metabolic Panel  -     Hemoglobin A1c  -     LDL Cholesterol, Direct; Future    2. Nausea  -     promethazine (PHENERGAN) 25 MG tablet; Take 1 tablet by mouth Every 6 (Six) Hours As Needed for Nausea. for nausea  Dispense: 30 tablet; Refill: 1    3. Essential hypertension    4. 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 Other atopic dermatitis   -     Alpha - Gal Panel; Future    6. Morbid obesity, unspecified obesity type (CMS/[facility])    7. Initial Medicare annual wellness visit    Other orders  -     HYDROcodone-acetaminophen (NORCO) 7.5-325 MG per tablet; Take 1 tablet by mouth Every 6 (Six) Hours As Needed for Moderate Pain .  Dispense: 100 tablet; Refill: 0  -     Empagliflozin 25 MG tablet; Take 25 mg by mouth Every Morning Before Breakfast.  Dispense: 30 tablet; Refill: 5  -     glucose blood test strip; Test daily and Prn. EZ Contour Next strips.  Dispense: 50 each; Refill: 5      Follow Up:  Return in about 3 months (around 1/22/2020) for Recheck.     An After Visit Summary and PPPS were given to the patient.             This  document has been electronically signed by Olga Rojas MD on October 22, 2019 3:47 PM

## 2019-11-19 DIAGNOSIS — R11.0 NAUSEA: ICD-10-CM

## 2019-11-19 RX ORDER — HYDROCODONE BITARTRATE AND ACETAMINOPHEN 7.5; 325 MG/1; MG/1
1 TABLET ORAL EVERY 6 HOURS PRN
Qty: 100 TABLET | Refills: 0 | Status: SHIPPED | OUTPATIENT
Start: 2019-11-19 | End: 2019-12-16 | Stop reason: SDUPTHER

## 2019-11-19 RX ORDER — LEVOTHYROXINE SODIUM 0.03 MG/1
25 TABLET ORAL DAILY
Qty: 30 TABLET | Refills: 5 | Status: SHIPPED | OUTPATIENT
Start: 2019-11-19 | End: 2020-01-21 | Stop reason: SDUPTHER

## 2019-11-19 RX ORDER — PROMETHAZINE HYDROCHLORIDE 25 MG/1
25 TABLET ORAL EVERY 6 HOURS PRN
Qty: 30 TABLET | Refills: 1 | Status: SHIPPED | OUTPATIENT
Start: 2019-11-19 | End: 2019-12-16 | Stop reason: SDUPTHER

## 2019-11-19 RX ORDER — ROSUVASTATIN CALCIUM 5 MG/1
5 TABLET, COATED ORAL DAILY
Qty: 30 TABLET | Refills: 5 | Status: SHIPPED | OUTPATIENT
Start: 2019-11-19 | End: 2020-01-21 | Stop reason: SDUPTHER

## 2019-12-16 DIAGNOSIS — R11.0 NAUSEA: ICD-10-CM

## 2019-12-16 DIAGNOSIS — M25.50 MULTIPLE JOINT PAIN: Primary | ICD-10-CM

## 2019-12-16 RX ORDER — PROMETHAZINE HYDROCHLORIDE 25 MG/1
25 TABLET ORAL EVERY 6 HOURS PRN
Qty: 30 TABLET | Refills: 1 | Status: SHIPPED | OUTPATIENT
Start: 2019-12-16 | End: 2020-01-02

## 2019-12-16 RX ORDER — HYDROCODONE BITARTRATE AND ACETAMINOPHEN 7.5; 325 MG/1; MG/1
1 TABLET ORAL EVERY 6 HOURS PRN
Qty: 100 TABLET | Refills: 0 | Status: SHIPPED | OUTPATIENT
Start: 2019-12-16 | End: 2020-01-13 | Stop reason: SDUPTHER

## 2020-01-02 DIAGNOSIS — R11.0 NAUSEA: ICD-10-CM

## 2020-01-02 RX ORDER — PROMETHAZINE HYDROCHLORIDE 25 MG/1
TABLET ORAL
Qty: 30 TABLET | Refills: 1 | Status: SHIPPED | OUTPATIENT
Start: 2020-01-02 | End: 2020-01-13 | Stop reason: SDUPTHER

## 2020-01-13 ENCOUNTER — LAB (OUTPATIENT)
Dept: LAB | Facility: OTHER | Age: 60
End: 2020-01-13

## 2020-01-13 DIAGNOSIS — E11.8 TYPE 2 DIABETES MELLITUS WITH COMPLICATION, WITHOUT LONG-TERM CURRENT USE OF INSULIN (HCC): ICD-10-CM

## 2020-01-13 DIAGNOSIS — R11.0 NAUSEA: ICD-10-CM

## 2020-01-13 DIAGNOSIS — M25.50 MULTIPLE JOINT PAIN: ICD-10-CM

## 2020-01-13 DIAGNOSIS — Z91.018 ALLERGY TO GALACTOSE-ALPHA-1,3-GALACTOSE: ICD-10-CM

## 2020-01-13 DIAGNOSIS — L20.89 OTHER ATOPIC DERMATITIS: ICD-10-CM

## 2020-01-13 LAB
ALBUMIN SERPL-MCNC: 4.3 G/DL (ref 3.5–5)
ALBUMIN/GLOB SERPL: 1.3 G/DL (ref 1.1–1.8)
ALP SERPL-CCNC: 81 U/L (ref 38–126)
ALT SERPL W P-5'-P-CCNC: 16 U/L
ANION GAP SERPL CALCULATED.3IONS-SCNC: 5 MMOL/L (ref 5–15)
ARTICHOKE IGE QN: 109 MG/DL (ref 0–100)
AST SERPL-CCNC: 18 U/L (ref 14–36)
BASOPHILS # BLD AUTO: 0.03 10*3/MM3 (ref 0–0.2)
BASOPHILS NFR BLD AUTO: 0.4 % (ref 0–1.5)
BILIRUB SERPL-MCNC: 0.5 MG/DL (ref 0.2–1.3)
BUN BLD-MCNC: 16 MG/DL (ref 7–23)
BUN/CREAT SERPL: 21.9 (ref 7–25)
CALCIUM SPEC-SCNC: 9.6 MG/DL (ref 8.4–10.2)
CHLORIDE SERPL-SCNC: 105 MMOL/L (ref 101–112)
CO2 SERPL-SCNC: 30 MMOL/L (ref 22–30)
CREAT BLD-MCNC: 0.73 MG/DL (ref 0.52–1.04)
DEPRECATED RDW RBC AUTO: 44.5 FL (ref 37–54)
EOSINOPHIL # BLD AUTO: 0.27 10*3/MM3 (ref 0–0.4)
EOSINOPHIL NFR BLD AUTO: 4 % (ref 0.3–6.2)
ERYTHROCYTE [DISTWIDTH] IN BLOOD BY AUTOMATED COUNT: 13.7 % (ref 12.3–15.4)
GFR SERPL CREATININE-BSD FRML MDRD: 81 ML/MIN/1.73 (ref 45–104)
GLOBULIN UR ELPH-MCNC: 3.4 GM/DL (ref 2.3–3.5)
GLUCOSE BLD-MCNC: 122 MG/DL (ref 70–99)
HBA1C MFR BLD: 6.1 % (ref 4.8–5.6)
HCT VFR BLD AUTO: 42.6 % (ref 34–46.6)
HGB BLD-MCNC: 13.7 G/DL (ref 12–15.9)
LYMPHOCYTES # BLD AUTO: 1.99 10*3/MM3 (ref 0.7–3.1)
LYMPHOCYTES NFR BLD AUTO: 29.4 % (ref 19.6–45.3)
MCH RBC QN AUTO: 29.1 PG (ref 26.6–33)
MCHC RBC AUTO-ENTMCNC: 32.2 G/DL (ref 31.5–35.7)
MCV RBC AUTO: 90.4 FL (ref 79–97)
MONOCYTES # BLD AUTO: 0.86 10*3/MM3 (ref 0.1–0.9)
MONOCYTES NFR BLD AUTO: 12.7 % (ref 5–12)
NEUTROPHILS # BLD AUTO: 3.62 10*3/MM3 (ref 1.7–7)
NEUTROPHILS NFR BLD AUTO: 53.5 % (ref 42.7–76)
PLATELET # BLD AUTO: 268 10*3/MM3 (ref 140–450)
PMV BLD AUTO: 10 FL (ref 6–12)
POTASSIUM BLD-SCNC: 4.2 MMOL/L (ref 3.4–5)
PROT SERPL-MCNC: 7.7 G/DL (ref 6.3–8.6)
RBC # BLD AUTO: 4.71 10*6/MM3 (ref 3.77–5.28)
SODIUM BLD-SCNC: 140 MMOL/L (ref 137–145)
WBC NRBC COR # BLD: 6.77 10*3/MM3 (ref 3.4–10.8)

## 2020-01-13 PROCEDURE — 80053 COMPREHEN METABOLIC PANEL: CPT | Performed by: FAMILY MEDICINE

## 2020-01-13 PROCEDURE — 85025 COMPLETE CBC W/AUTO DIFF WBC: CPT | Performed by: FAMILY MEDICINE

## 2020-01-13 PROCEDURE — 86003 ALLG SPEC IGE CRUDE XTRC EA: CPT | Performed by: FAMILY MEDICINE

## 2020-01-13 PROCEDURE — 83036 HEMOGLOBIN GLYCOSYLATED A1C: CPT | Performed by: FAMILY MEDICINE

## 2020-01-13 PROCEDURE — 83721 ASSAY OF BLOOD LIPOPROTEIN: CPT | Performed by: FAMILY MEDICINE

## 2020-01-13 PROCEDURE — 86008 ALLG SPEC IGE RECOMB EA: CPT | Performed by: FAMILY MEDICINE

## 2020-01-13 PROCEDURE — 36415 COLL VENOUS BLD VENIPUNCTURE: CPT | Performed by: FAMILY MEDICINE

## 2020-01-13 RX ORDER — PROMETHAZINE HYDROCHLORIDE 25 MG/1
25 TABLET ORAL EVERY 8 HOURS PRN
Qty: 30 TABLET | Refills: 1 | Status: SHIPPED | OUTPATIENT
Start: 2020-01-13 | End: 2020-01-21 | Stop reason: SDUPTHER

## 2020-01-13 RX ORDER — HYDROCODONE BITARTRATE AND ACETAMINOPHEN 7.5; 325 MG/1; MG/1
1 TABLET ORAL EVERY 6 HOURS PRN
Qty: 100 TABLET | Refills: 0 | Status: SHIPPED | OUTPATIENT
Start: 2020-01-13 | End: 2020-02-10 | Stop reason: SDUPTHER

## 2020-01-17 LAB
ALPHA GAL IGE: 0.51 KU/L
BEEF IGE QN: 0.17 KU/L
LAMB IGE QN: <0.1 KU/L
Lab: 0
Lab: 0
Lab: ABNORMAL
PORK IGE: <0.1 KU/L

## 2020-01-17 NOTE — PROGRESS NOTES
Please call the patient regarding her abnormal result.  Alpha gal has gone down quite a bit but it is still in the positive range.  I feel like in 6 more months it will probably be negative but she should continue the food precautions until then

## 2020-01-21 ENCOUNTER — OFFICE VISIT (OUTPATIENT)
Dept: FAMILY MEDICINE CLINIC | Facility: CLINIC | Age: 60
End: 2020-01-21

## 2020-01-21 VITALS
SYSTOLIC BLOOD PRESSURE: 134 MMHG | WEIGHT: 210 LBS | BODY MASS INDEX: 41.23 KG/M2 | DIASTOLIC BLOOD PRESSURE: 80 MMHG | HEIGHT: 60 IN

## 2020-01-21 DIAGNOSIS — R19.00 ABDOMINAL WALL MASS OF RIGHT FLANK: ICD-10-CM

## 2020-01-21 DIAGNOSIS — E66.01 CLASS 3 SEVERE OBESITY DUE TO EXCESS CALORIES WITH SERIOUS COMORBIDITY AND BODY MASS INDEX (BMI) OF 40.0 TO 44.9 IN ADULT (HCC): ICD-10-CM

## 2020-01-21 DIAGNOSIS — R11.0 NAUSEA: ICD-10-CM

## 2020-01-21 DIAGNOSIS — E66.01 MORBID OBESITY, UNSPECIFIED OBESITY TYPE (HCC): ICD-10-CM

## 2020-01-21 DIAGNOSIS — M25.561 RECURRENT PAIN OF RIGHT KNEE: ICD-10-CM

## 2020-01-21 DIAGNOSIS — E11.8 TYPE 2 DIABETES MELLITUS WITH COMPLICATION, WITHOUT LONG-TERM CURRENT USE OF INSULIN (HCC): Primary | ICD-10-CM

## 2020-01-21 PROCEDURE — 99214 OFFICE O/P EST MOD 30 MIN: CPT | Performed by: FAMILY MEDICINE

## 2020-01-21 RX ORDER — PROMETHAZINE HYDROCHLORIDE 25 MG/1
25 TABLET ORAL EVERY 8 HOURS PRN
Qty: 30 TABLET | Refills: 1 | Status: SHIPPED | OUTPATIENT
Start: 2020-01-21 | End: 2020-01-22

## 2020-01-21 RX ORDER — ROSUVASTATIN CALCIUM 5 MG/1
5 TABLET, COATED ORAL DAILY
Qty: 30 TABLET | Refills: 5 | Status: SHIPPED | OUTPATIENT
Start: 2020-01-21 | End: 2020-01-22 | Stop reason: SDUPTHER

## 2020-01-21 RX ORDER — LEVOTHYROXINE SODIUM 0.03 MG/1
25 TABLET ORAL DAILY
Qty: 30 TABLET | Refills: 5 | Status: SHIPPED | OUTPATIENT
Start: 2020-01-21 | End: 2020-04-27

## 2020-01-21 NOTE — PROGRESS NOTES
"Subjective   Prema Curtis is a 59 y.o. female.  She's here today for follow-up on on newly diagnosed type 2 diabetes, hypertension.  She has alpha gal and recent titers were still in the positive range but much lower.  We discussed rechecking titers again in 6 months.  She has been extremely compliant with her diabetic diet and is continuing to lose weight.  She is lost another 8 pounds since her last visit.  Her A1c is down to 6.1.  She is commended on her efforts.    She noticed a mass on her right side in the last few days.  It is a bit sore.  She denies any specific trauma to it and she feels like she might be getting a similar mass coming up on the left flank area.    She is having increasing pain in her knee.  She had a knee replacement approximately 9 years ago.  X-rays done in the last couple years showed good alignment but recently she feels like it is giving out on her.    History of Present Illness   The following portions of the patient's history were reviewed and updated as appropriate: allergies, current medications, past family history, past medical history, past social history, past surgical history and problem list.      Review of Systems   Constitutional: Negative.    HENT: Negative for postnasal drip and sneezing.    Respiratory: Negative for shortness of breath.    Cardiovascular: Negative.    Gastrointestinal: Negative.    Musculoskeletal: Positive for arthralgias.   Skin: Positive for rash.   Allergic/Immunologic: Positive for food allergies. Negative for immunocompromised state.   Neurological: Negative for syncope.   Hematological: Negative.    Psychiatric/Behavioral: Negative for agitation, dysphoric mood and sleep disturbance.   All other systems reviewed and are negative.    Objective    Vitals:    01/21/20 1326   BP: 134/80   Weight: 95.3 kg (210 lb)   Height: 152.4 cm (60\")   PainSc: 0-No pain   Body mass index is 41.01 kg/m².    Physical Exam   Constitutional: She is oriented to " person, place, and time. She appears well-developed and well-nourished.   HENT:   Head: Normocephalic and atraumatic.   Nose: Nose normal.   Mouth/Throat: Oropharynx is clear and moist.   Eyes: Pupils are equal, round, and reactive to light. Conjunctivae and EOM are normal.   Neck: Normal range of motion. Neck supple. No JVD present. No tracheal deviation present. No thyromegaly present.   Cardiovascular: Normal rate, regular rhythm, normal heart sounds and intact distal pulses.   No murmur heard.  Pulmonary/Chest: Effort normal and breath sounds normal. She has no wheezes.   Abdominal: Soft. Bowel sounds are normal. She exhibits no distension. There is no tenderness.   On the right flank she has a somewhat firm and fixed oblong or oval-shaped mass.  It feels a bit more firm than a lipoma usually does.  Aspiration is attempted but there was no liquid and it seems more solid.   Musculoskeletal: She exhibits tenderness. She exhibits no edema.   Decreased range of motion in the left shoulder in all directions with pain anteriorly   Lymphadenopathy:     She has no cervical adenopathy.   Neurological: She is alert and oriented to person, place, and time. She displays normal reflexes. Coordination abnormal.   Skin: Skin is warm and dry. No rash noted.   Psychiatric: She has a normal mood and affect.   Nursing note and vitals reviewed.      Assessment/Plan   Prema was seen today for med refill.    Diagnoses and all orders for this visit:    Type 2 diabetes mellitus with complication, without long-term current use of insulin (CMS/[facility])    Nausea  -     promethazine (PHENERGAN) 25 MG tablet; Take 1 tablet by mouth Every 8 (Eight) Hours As Needed for Nausea or Vomiting.    Morbid obesity, unspecified obesity type (CMS/[facility])    Recurrent pain of right knee    Abdominal wall mass of right flank    Class 3 severe obesity due to excess calories with serious comorbidity and body mass index (BMI) of 40.0 to 44.9 in adult  (CMS/[facility])    Other orders  -     Empagliflozin 25 MG tablet; Take 25 mg by mouth Every Morning Before Breakfast.  -     levothyroxine (SYNTHROID, LEVOTHROID) 25 MCG tablet; Take 1 tablet by mouth Daily.  -     rosuvastatin (CRESTOR) 5 MG tablet; Take 1 tablet by mouth Daily.    Discussed the patient's BMI with her. BMI is above normal parameters. Follow-up plan includes:  educational material and nutrition counseling.    Continue lisinopril for hypertension and Jardiance for diabetes, can continue Crestor for lipids and current dose of Synthroid.    Continue to test blood sugars daily and when needed and continue with diabetic diet and weight loss efforts.  She is commended on her tremendous efforts.    If knee not improving significantly within the next month we will get further radiologic studies    Will refer to general surgery to get a possible biopsy of the mass on the right flank.          This document has been electronically signed by Olga Rojas MD on January 21, 2020 2:14 PM

## 2020-01-22 DIAGNOSIS — R11.0 NAUSEA: ICD-10-CM

## 2020-01-22 RX ORDER — PROMETHAZINE HYDROCHLORIDE 25 MG/1
TABLET ORAL
Qty: 30 TABLET | Refills: 1 | Status: SHIPPED | OUTPATIENT
Start: 2020-01-22 | End: 2020-03-02

## 2020-01-22 RX ORDER — ROSUVASTATIN CALCIUM 5 MG/1
5 TABLET, COATED ORAL DAILY
Qty: 30 TABLET | Refills: 5 | Status: SHIPPED | OUTPATIENT
Start: 2020-01-22 | End: 2020-08-20 | Stop reason: SDUPTHER

## 2020-02-07 RX ORDER — LANCETS 28 GAUGE
EACH MISCELLANEOUS
Qty: 100 EACH | Refills: 5 | Status: SHIPPED | OUTPATIENT
Start: 2020-02-07 | End: 2020-04-21 | Stop reason: SDUPTHER

## 2020-02-10 DIAGNOSIS — M25.50 MULTIPLE JOINT PAIN: ICD-10-CM

## 2020-02-10 RX ORDER — HYDROCODONE BITARTRATE AND ACETAMINOPHEN 7.5; 325 MG/1; MG/1
1 TABLET ORAL EVERY 6 HOURS PRN
Qty: 100 TABLET | Refills: 0 | Status: SHIPPED | OUTPATIENT
Start: 2020-02-10 | End: 2020-03-09 | Stop reason: SDUPTHER

## 2020-03-02 DIAGNOSIS — R11.0 NAUSEA: ICD-10-CM

## 2020-03-02 RX ORDER — PROMETHAZINE HYDROCHLORIDE 25 MG/1
TABLET ORAL
Qty: 30 TABLET | Refills: 1 | Status: SHIPPED | OUTPATIENT
Start: 2020-03-02 | End: 2020-03-28

## 2020-03-09 DIAGNOSIS — M25.50 MULTIPLE JOINT PAIN: ICD-10-CM

## 2020-03-09 RX ORDER — HYDROCODONE BITARTRATE AND ACETAMINOPHEN 7.5; 325 MG/1; MG/1
1 TABLET ORAL EVERY 6 HOURS PRN
Qty: 100 TABLET | Refills: 0 | Status: SHIPPED | OUTPATIENT
Start: 2020-03-09 | End: 2020-04-06 | Stop reason: SDUPTHER

## 2020-03-26 DIAGNOSIS — R11.0 NAUSEA: ICD-10-CM

## 2020-03-28 RX ORDER — PROMETHAZINE HYDROCHLORIDE 25 MG/1
TABLET ORAL
Qty: 30 TABLET | Refills: 1 | Status: SHIPPED | OUTPATIENT
Start: 2020-03-28 | End: 2020-05-05 | Stop reason: SDUPTHER

## 2020-04-06 DIAGNOSIS — M25.50 MULTIPLE JOINT PAIN: ICD-10-CM

## 2020-04-07 RX ORDER — HYDROCODONE BITARTRATE AND ACETAMINOPHEN 7.5; 325 MG/1; MG/1
1 TABLET ORAL EVERY 6 HOURS PRN
Qty: 100 TABLET | Refills: 0 | Status: SHIPPED | OUTPATIENT
Start: 2020-04-07 | End: 2020-05-05 | Stop reason: SDUPTHER

## 2020-04-21 ENCOUNTER — OFFICE VISIT (OUTPATIENT)
Dept: FAMILY MEDICINE CLINIC | Facility: CLINIC | Age: 60
End: 2020-04-21

## 2020-04-21 DIAGNOSIS — E11.8 TYPE 2 DIABETES MELLITUS WITH COMPLICATION, WITHOUT LONG-TERM CURRENT USE OF INSULIN (HCC): Primary | ICD-10-CM

## 2020-04-21 DIAGNOSIS — I10 ESSENTIAL HYPERTENSION: ICD-10-CM

## 2020-04-21 DIAGNOSIS — M25.50 MULTIPLE JOINT PAIN: ICD-10-CM

## 2020-04-21 DIAGNOSIS — Z91.018 ALLERGY TO GALACTOSE-ALPHA-1,3-GALACTOSE: ICD-10-CM

## 2020-04-21 PROCEDURE — G2025 DIS SITE TELE SVCS RHC/FQHC: HCPCS | Performed by: FAMILY MEDICINE

## 2020-04-21 RX ORDER — LANCETS 28 GAUGE
EACH MISCELLANEOUS
Qty: 100 EACH | Refills: 5 | Status: SHIPPED | OUTPATIENT
Start: 2020-04-21

## 2020-04-21 NOTE — PROGRESS NOTES
Subjective   rPema Curtis is a 60 y.o. female. This visit has been rescheduled as a phone visit to comply with patient safety concerns in accordance with CDC recommendations. Total time of discussion was 25 minutes.    You have chosen to receive care through a telephone visit. Do you consent to use a telephone visit for your medical care today? Yes  We had a telephone visit today due to the Covid-19 quarantine.     She has alpha gal allergy, and diabetes.  She is diligent about the diet for both.  Her blood sugars have been running around 100-110.  She feels like her blood pressure has been ok but she has no way to check it at home.     She has a lot of knee pain and other joint pain and takes Norco.  She doesn't need a refill today.     History of Present Illness  Hypertension   This is a chronic problem. The current episode started more than 1 year ago. The problem has been waxing and waning since onset. Associated symptoms include anxiety, headaches and malaise/fatigue. Pertinent negatives include no blurred vision, chest pain, neck pain, orthopnea, palpitations, peripheral edema, PND, shortness of breath or sweats. There are no associated agents to hypertension. Risk factors for coronary artery disease include dyslipidemia and family history. Past treatments include ACE inhibitors,  The current treatment provides moderate improvement. There are no compliance problems.      The following portions of the patient's history were reviewed and updated as appropriate: allergies, current medications, past family history, past medical history, past social history, past surgical history and problem list.    Review of Systems   Constitutional: Negative.    HENT: Negative for postnasal drip and sneezing.    Respiratory: Negative for shortness of breath.    Cardiovascular: Negative.    Gastrointestinal: Negative.    Musculoskeletal: Positive for arthralgias.   Skin: Positive for rash.   Allergic/Immunologic: Positive  for food allergies. Negative for immunocompromised state.   Neurological: Negative for syncope.   Hematological: Negative.    Psychiatric/Behavioral: Negative for agitation, dysphoric mood and sleep disturbance.   All other systems reviewed and are negative.      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  Continue diet precautions until then.     Continue diabetic diet and current meds, weight loss efforts, and test blood sugars daily and prn. Continue lisinopril for hypertension.     Continue hydrocodone for joint pain, no refill needed today.           This document has been electronically signed by Olga Rojas MD on April 21, 2020 15:50

## 2020-04-27 RX ORDER — LEVOTHYROXINE SODIUM 0.03 MG/1
TABLET ORAL
Qty: 30 TABLET | Refills: 5 | Status: SHIPPED | OUTPATIENT
Start: 2020-04-27 | End: 2020-08-20 | Stop reason: SDUPTHER

## 2020-05-05 DIAGNOSIS — R11.0 NAUSEA: ICD-10-CM

## 2020-05-05 DIAGNOSIS — M25.50 MULTIPLE JOINT PAIN: ICD-10-CM

## 2020-05-05 RX ORDER — PROMETHAZINE HYDROCHLORIDE 25 MG/1
TABLET ORAL
Qty: 30 TABLET | Refills: 0 | Status: SHIPPED | OUTPATIENT
Start: 2020-05-05 | End: 2020-06-02 | Stop reason: SDUPTHER

## 2020-05-05 RX ORDER — HYDROCODONE BITARTRATE AND ACETAMINOPHEN 7.5; 325 MG/1; MG/1
1 TABLET ORAL EVERY 6 HOURS PRN
Qty: 100 TABLET | Refills: 0 | Status: SHIPPED | OUTPATIENT
Start: 2020-05-05 | End: 2020-06-02 | Stop reason: SDUPTHER

## 2020-05-05 NOTE — TELEPHONE ENCOUNTER
----- Message from Kashif Rosado sent at 5/5/2020  9:10 AM CDT -----  Regarding: Refill  Contact: [phone]  Patient called; needing refill for:    Lortab  Phenergan    [facility], [place]    Last seen April 2020

## 2020-05-21 ENCOUNTER — OFFICE VISIT (OUTPATIENT)
Dept: FAMILY MEDICINE CLINIC | Facility: CLINIC | Age: 60
End: 2020-05-21

## 2020-05-21 ENCOUNTER — LAB (OUTPATIENT)
Dept: LAB | Facility: OTHER | Age: 60
End: 2020-05-21

## 2020-05-21 VITALS — BODY MASS INDEX: 41.01 KG/M2 | HEIGHT: 60 IN

## 2020-05-21 DIAGNOSIS — E88.09 ALPHA GALACTOSIDASE DEFICIENCY: ICD-10-CM

## 2020-05-21 DIAGNOSIS — E11.9 TYPE 2 DIABETES MELLITUS WITHOUT COMPLICATION, UNSPECIFIED WHETHER LONG TERM INSULIN USE (HCC): Primary | ICD-10-CM

## 2020-05-21 DIAGNOSIS — I10 BENIGN ESSENTIAL HTN: ICD-10-CM

## 2020-05-21 DIAGNOSIS — L50.0 ALLERGIC URTICARIA: ICD-10-CM

## 2020-05-21 DIAGNOSIS — E11.9 TYPE 2 DIABETES MELLITUS WITHOUT COMPLICATION, UNSPECIFIED WHETHER LONG TERM INSULIN USE (HCC): ICD-10-CM

## 2020-05-21 DIAGNOSIS — M25.50 MULTIPLE JOINT PAIN: ICD-10-CM

## 2020-05-21 LAB
ALBUMIN SERPL-MCNC: 4.3 G/DL (ref 3.5–5)
ALBUMIN/GLOB SERPL: 1.3 G/DL (ref 1.1–1.8)
ALP SERPL-CCNC: 76 U/L (ref 38–126)
ALT SERPL W P-5'-P-CCNC: 17 U/L
ANION GAP SERPL CALCULATED.3IONS-SCNC: 8 MMOL/L (ref 5–15)
AST SERPL-CCNC: 21 U/L (ref 14–36)
BASOPHILS # BLD AUTO: 0.05 10*3/MM3 (ref 0–0.2)
BASOPHILS NFR BLD AUTO: 0.6 % (ref 0–1.5)
BILIRUB SERPL-MCNC: 0.6 MG/DL (ref 0.2–1.3)
BUN BLD-MCNC: 13 MG/DL (ref 7–23)
BUN/CREAT SERPL: 18.6 (ref 7–25)
CALCIUM SPEC-SCNC: 9.3 MG/DL (ref 8.4–10.2)
CHLORIDE SERPL-SCNC: 106 MMOL/L (ref 101–112)
CHOLEST SERPL-MCNC: 169 MG/DL (ref 150–200)
CO2 SERPL-SCNC: 27 MMOL/L (ref 22–30)
CREAT BLD-MCNC: 0.7 MG/DL (ref 0.52–1.04)
DEPRECATED RDW RBC AUTO: 44.8 FL (ref 37–54)
EOSINOPHIL # BLD AUTO: 0.37 10*3/MM3 (ref 0–0.4)
EOSINOPHIL NFR BLD AUTO: 4.6 % (ref 0.3–6.2)
ERYTHROCYTE [DISTWIDTH] IN BLOOD BY AUTOMATED COUNT: 13.6 % (ref 12.3–15.4)
GFR SERPL CREATININE-BSD FRML MDRD: 85 ML/MIN/1.73 (ref 45–104)
GLOBULIN UR ELPH-MCNC: 3.4 GM/DL (ref 2.3–3.5)
GLUCOSE BLD-MCNC: 119 MG/DL (ref 70–99)
HCT VFR BLD AUTO: 43.2 % (ref 34–46.6)
HDLC SERPL-MCNC: 46 MG/DL (ref 40–59)
HGB BLD-MCNC: 14.1 G/DL (ref 12–15.9)
LDLC SERPL CALC-MCNC: 90 MG/DL
LDLC/HDLC SERPL: 1.97 {RATIO} (ref 0–3.22)
LYMPHOCYTES # BLD AUTO: 2.3 10*3/MM3 (ref 0.7–3.1)
LYMPHOCYTES NFR BLD AUTO: 28.8 % (ref 19.6–45.3)
MCH RBC QN AUTO: 29.7 PG (ref 26.6–33)
MCHC RBC AUTO-ENTMCNC: 32.6 G/DL (ref 31.5–35.7)
MCV RBC AUTO: 91.1 FL (ref 79–97)
MONOCYTES # BLD AUTO: 0.79 10*3/MM3 (ref 0.1–0.9)
MONOCYTES NFR BLD AUTO: 9.9 % (ref 5–12)
NEUTROPHILS # BLD AUTO: 4.47 10*3/MM3 (ref 1.7–7)
NEUTROPHILS NFR BLD AUTO: 56.1 % (ref 42.7–76)
PLATELET # BLD AUTO: 288 10*3/MM3 (ref 140–450)
PMV BLD AUTO: 10.3 FL (ref 6–12)
POTASSIUM BLD-SCNC: 3.9 MMOL/L (ref 3.4–5)
PROT SERPL-MCNC: 7.7 G/DL (ref 6.3–8.6)
RBC # BLD AUTO: 4.74 10*6/MM3 (ref 3.77–5.28)
SODIUM BLD-SCNC: 141 MMOL/L (ref 137–145)
TRIGL SERPL-MCNC: 163 MG/DL
VLDLC SERPL-MCNC: 32.6 MG/DL
WBC NRBC COR # BLD: 7.98 10*3/MM3 (ref 3.4–10.8)

## 2020-05-21 PROCEDURE — 80061 LIPID PANEL: CPT | Performed by: FAMILY MEDICINE

## 2020-05-21 PROCEDURE — 80053 COMPREHEN METABOLIC PANEL: CPT | Performed by: FAMILY MEDICINE

## 2020-05-21 PROCEDURE — 86003 ALLG SPEC IGE CRUDE XTRC EA: CPT | Performed by: FAMILY MEDICINE

## 2020-05-21 PROCEDURE — 84443 ASSAY THYROID STIM HORMONE: CPT | Performed by: FAMILY MEDICINE

## 2020-05-21 PROCEDURE — 85025 COMPLETE CBC W/AUTO DIFF WBC: CPT | Performed by: FAMILY MEDICINE

## 2020-05-21 PROCEDURE — G2025 DIS SITE TELE SVCS RHC/FQHC: HCPCS | Performed by: FAMILY MEDICINE

## 2020-05-21 PROCEDURE — 83036 HEMOGLOBIN GLYCOSYLATED A1C: CPT | Performed by: FAMILY MEDICINE

## 2020-05-21 PROCEDURE — 84439 ASSAY OF FREE THYROXINE: CPT | Performed by: FAMILY MEDICINE

## 2020-05-21 PROCEDURE — 36415 COLL VENOUS BLD VENIPUNCTURE: CPT | Performed by: FAMILY MEDICINE

## 2020-05-21 PROCEDURE — 86008 ALLG SPEC IGE RECOMB EA: CPT | Performed by: FAMILY MEDICINE

## 2020-05-21 NOTE — PROGRESS NOTES
Subjective   Prema Curtis is a 60 y.o. female.    Seen today by video visit due to the Covid-19 quarantine.   Patient with diabetes, hypertension.  She is still working on her diet and losing weight.  Her blood sugars run consistently around 100.  Her blood pressure is staying at goal.   She is still avoiding mammal meats due to the alpha gal.   Takes norco for arthritis pain, does not need refill today.   She had labs this morning.   This visit has been rescheduled as a phone visit to comply with patient safety concerns in accordance with CDC recommendations. Total time of discussion was 22 minutes.    You have chosen to receive care through a telephone visit. Do you consent to use a telephone visit for your medical care today? Yes    History of Present Illness  Arthritis   Presents for follow-up visit. Complains of pain, stiffness and joint swelling, reports no joint warmth. The symptoms have been worsening. Affected location: diffuse. Pain is at a severity of 8/10. Associated symptoms include fatigue, pain at night and pain while resting. Pertinent negatives include no diarrhea, dry eyes, dry mouth, dysuria, fever, rash, Raynaud's syndrome, uveitis or weight loss. Compliance with total regimen is %. Compliance with medications is %.       The following portions of the patient's history were reviewed and updated as appropriate: allergies, current medications, past family history, past medical history, past social history, past surgical history and problem list.    Review of Systems   Constitutional: Negative.    HENT: Negative for postnasal drip and sneezing.    Respiratory: Negative for shortness of breath.    Cardiovascular: Negative.    Gastrointestinal: Negative.    Musculoskeletal: Positive for arthralgias.   Skin: Positive for rash.   Allergic/Immunologic: Positive for food allergies. Negative for immunocompromised state.   Neurological: Negative for syncope.   Hematological: Negative.     Psychiatric/Behavioral: Negative for agitation, dysphoric mood and sleep disturbance.   All other systems reviewed and are negative.      Objective   Physical Exam     Lab on 05/21/2020   Component Date Value Ref Range Status   • Glucose 05/21/2020 119* 70 - 99 mg/dL Final   • BUN 05/21/2020 13  7 - 23 mg/dL Final   • Creatinine 05/21/2020 0.70  0.52 - 1.04 mg/dL Final   • Sodium 05/21/2020 141  137 - 145 mmol/L Final   • Potassium 05/21/2020 3.9  3.4 - 5.0 mmol/L Final   • Chloride 05/21/2020 106  101 - 112 mmol/L Final   • CO2 05/21/2020 27.0  22.0 - 30.0 mmol/L Final   • Calcium 05/21/2020 9.3  8.4 - 10.2 mg/dL Final   • Total Protein 05/21/2020 7.7  6.3 - 8.6 g/dL Final   • Albumin 05/21/2020 4.30  3.50 - 5.00 g/dL Final   • ALT (SGPT) 05/21/2020 17  <=35 U/L Final   • AST (SGOT) 05/21/2020 21  14 - 36 U/L Final   • Alkaline Phosphatase 05/21/2020 76  38 - 126 U/L Final   • Total Bilirubin 05/21/2020 0.6  0.2 - 1.3 mg/dL Final   • eGFR Non African Amer 05/21/2020 85  45 - 104 mL/min/1.73 Final   • Globulin 05/21/2020 3.4  2.3 - 3.5 gm/dL Final   • A/G Ratio 05/21/2020 1.3  1.1 - 1.8 g/dL Final   • BUN/Creatinine Ratio 05/21/2020 18.6  7.0 - 25.0 Final   • Anion Gap 05/21/2020 8.0  5.0 - 15.0 mmol/L Final   • Total Cholesterol 05/21/2020 169  150 - 200 mg/dL Final   • Triglycerides 05/21/2020 163* <=150 mg/dL Final   • HDL Cholesterol 05/21/2020 46  40 - 59 mg/dL Final   • LDL Cholesterol  05/21/2020 90  <=100 mg/dL Final   • VLDL Cholesterol 05/21/2020 32.6  mg/dL Final   • LDL/HDL Ratio 05/21/2020 1.97  0.00 - 3.22 Final   • WBC 05/21/2020 7.98  3.40 - 10.80 10*3/mm3 Final   • RBC 05/21/2020 4.74  3.77 - 5.28 10*6/mm3 Final   • Hemoglobin 05/21/2020 14.1  12.0 - 15.9 g/dL Final   • Hematocrit 05/21/2020 43.2  34.0 - 46.6 % Final   • MCV 05/21/2020 91.1  79.0 - 97.0 fL Final   • MCH 05/21/2020 29.7  26.6 - 33.0 pg Final   • MCHC 05/21/2020 32.6  31.5 - 35.7 g/dL Final   • RDW 05/21/2020 13.6  12.3 - 15.4 %  Final   • RDW-SD 05/21/2020 44.8  37.0 - 54.0 fl Final   • MPV 05/21/2020 10.3  6.0 - 12.0 fL Final   • Platelets 05/21/2020 288  140 - 450 10*3/mm3 Final   • Neutrophil % 05/21/2020 56.1  42.7 - 76.0 % Final   • Lymphocyte % 05/21/2020 28.8  19.6 - 45.3 % Final   • Monocyte % 05/21/2020 9.9  5.0 - 12.0 % Final   • Eosinophil % 05/21/2020 4.6  0.3 - 6.2 % Final   • Basophil % 05/21/2020 0.6  0.0 - 1.5 % Final   • Neutrophils, Absolute 05/21/2020 4.47  1.70 - 7.00 10*3/mm3 Final   • Lymphocytes, Absolute 05/21/2020 2.30  0.70 - 3.10 10*3/mm3 Final   • Monocytes, Absolute 05/21/2020 0.79  0.10 - 0.90 10*3/mm3 Final   • Eosinophils, Absolute 05/21/2020 0.37  0.00 - 0.40 10*3/mm3 Final   • Basophils, Absolute 05/21/2020 0.05  0.00 - 0.20 10*3/mm3 Final     Assessment/Plan   Prema was seen today for diabetes.    Diagnoses and all orders for this visit:    Type 2 diabetes mellitus without complication, unspecified whether long term insulin use (CMS/[facility])  -     Comprehensive metabolic panel; Future  -     Hemoglobin A1c; Future  -     TSH; Future  -     T4, free; Future    Benign essential HTN  -     Lipid panel; Future  -     CBC w AUTO Differential; Future  -     TSH; Future  -     T4, free; Future    Alpha galactosidase deficiency  -     Alpha - Gal Panel; Future    Allergic urticaria   -     Alpha - Gal Panel; Future    Multiple joint pain    Continue with current diabetes medications and testing of blood sugars at least daily and prn.  Encourage compliance with diabetic diet.      Continue with current meds for hypertension, diet changes for diabetes and alpha gal.     Reviewed labs as above.     She does not need a refill today of her Norco but will call when she does. The patient has read and signed the [facility] Controlled Substance Contract.  I will continue to see patient for regular follow up appointments. Patient is well controlled on the medication.  SHAYLA has been reviewed by me and is updated every 3  months. The patient is aware of the potential for addiction and dependence.             This document has been electronically signed by Olga Rojas MD on May 21, 2020 10:44

## 2020-05-22 LAB
HBA1C MFR BLD: 6.22 % (ref 4.8–5.6)
T4 FREE SERPL-MCNC: 1.25 NG/DL (ref 0.93–1.7)
TSH SERPL DL<=0.05 MIU/L-ACNC: 3.56 UIU/ML (ref 0.27–4.2)

## 2020-05-26 NOTE — PROGRESS NOTES
Notify patient test results are ok, her alpha gal is not back yet though.  Tell her we will call her when it comes in

## 2020-05-30 LAB
ALPHA GAL IGE: 0.47 KU/L
BEEF IGE QN: 0.16 KU/L
LAMB IGE QN: <0.1 KU/L
Lab: 0
Lab: 0
Lab: ABNORMAL
PORK IGE: <0.1 KU/L

## 2020-06-02 DIAGNOSIS — R11.0 NAUSEA: ICD-10-CM

## 2020-06-02 DIAGNOSIS — M25.50 MULTIPLE JOINT PAIN: ICD-10-CM

## 2020-06-02 RX ORDER — PROMETHAZINE HYDROCHLORIDE 25 MG/1
TABLET ORAL
Qty: 30 TABLET | Refills: 0 | Status: SHIPPED | OUTPATIENT
Start: 2020-06-02 | End: 2020-06-30 | Stop reason: SDUPTHER

## 2020-06-02 RX ORDER — HYDROCODONE BITARTRATE AND ACETAMINOPHEN 7.5; 325 MG/1; MG/1
1 TABLET ORAL EVERY 6 HOURS PRN
Qty: 100 TABLET | Refills: 0 | Status: SHIPPED | OUTPATIENT
Start: 2020-06-02 | End: 2020-06-30 | Stop reason: SDUPTHER

## 2020-06-30 DIAGNOSIS — R11.0 NAUSEA: ICD-10-CM

## 2020-06-30 DIAGNOSIS — M25.50 MULTIPLE JOINT PAIN: ICD-10-CM

## 2020-06-30 RX ORDER — HYDROCODONE BITARTRATE AND ACETAMINOPHEN 7.5; 325 MG/1; MG/1
1 TABLET ORAL EVERY 6 HOURS PRN
Qty: 100 TABLET | Refills: 0 | Status: SHIPPED | OUTPATIENT
Start: 2020-06-30 | End: 2020-07-29 | Stop reason: SDUPTHER

## 2020-06-30 RX ORDER — PROMETHAZINE HYDROCHLORIDE 25 MG/1
TABLET ORAL
Qty: 30 TABLET | Refills: 0 | Status: SHIPPED | OUTPATIENT
Start: 2020-06-30 | End: 2020-07-29 | Stop reason: SDUPTHER

## 2020-07-06 RX ORDER — ALBUTEROL SULFATE 90 UG/1
AEROSOL, METERED RESPIRATORY (INHALATION)
Qty: 18 G | Refills: 5 | Status: SHIPPED | OUTPATIENT
Start: 2020-07-06 | End: 2022-06-09

## 2020-07-29 DIAGNOSIS — M25.50 MULTIPLE JOINT PAIN: ICD-10-CM

## 2020-07-29 DIAGNOSIS — R11.0 NAUSEA: ICD-10-CM

## 2020-07-29 RX ORDER — HYDROCODONE BITARTRATE AND ACETAMINOPHEN 7.5; 325 MG/1; MG/1
1 TABLET ORAL EVERY 6 HOURS PRN
Qty: 100 TABLET | Refills: 0 | Status: SHIPPED | OUTPATIENT
Start: 2020-07-29 | End: 2020-08-20 | Stop reason: SDUPTHER

## 2020-07-29 RX ORDER — PROMETHAZINE HYDROCHLORIDE 25 MG/1
TABLET ORAL
Qty: 30 TABLET | Refills: 0 | Status: SHIPPED | OUTPATIENT
Start: 2020-07-29 | End: 2020-08-20 | Stop reason: SDUPTHER

## 2020-08-03 RX ORDER — EMPAGLIFLOZIN 25 MG/1
TABLET, FILM COATED ORAL
Qty: 30 TABLET | Refills: 5 | Status: SHIPPED | OUTPATIENT
Start: 2020-08-03 | End: 2020-08-20 | Stop reason: SDUPTHER

## 2020-08-20 ENCOUNTER — OFFICE VISIT (OUTPATIENT)
Dept: FAMILY MEDICINE CLINIC | Facility: CLINIC | Age: 60
End: 2020-08-20

## 2020-08-20 DIAGNOSIS — E78.01 FAMILIAL HYPERCHOLESTEROLEMIA: ICD-10-CM

## 2020-08-20 DIAGNOSIS — I10 ESSENTIAL HYPERTENSION: ICD-10-CM

## 2020-08-20 DIAGNOSIS — M25.50 MULTIPLE JOINT PAIN: Primary | ICD-10-CM

## 2020-08-20 DIAGNOSIS — E03.9 HYPOTHYROIDISM, UNSPECIFIED TYPE: ICD-10-CM

## 2020-08-20 DIAGNOSIS — E11.9 TYPE 2 DIABETES MELLITUS WITHOUT COMPLICATION, UNSPECIFIED WHETHER LONG TERM INSULIN USE (HCC): ICD-10-CM

## 2020-08-20 DIAGNOSIS — Z91.018 ALLERGY TO GALACTOSE-ALPHA-1,3-GALACTOSE: ICD-10-CM

## 2020-08-20 DIAGNOSIS — R11.0 NAUSEA: ICD-10-CM

## 2020-08-20 DIAGNOSIS — L50.8 OTHER URTICARIA: ICD-10-CM

## 2020-08-20 PROCEDURE — G2025 DIS SITE TELE SVCS RHC/FQHC: HCPCS | Performed by: FAMILY MEDICINE

## 2020-08-20 RX ORDER — ROSUVASTATIN CALCIUM 5 MG/1
5 TABLET, COATED ORAL DAILY
Qty: 30 TABLET | Refills: 5 | Status: SHIPPED | OUTPATIENT
Start: 2020-08-20 | End: 2020-10-26

## 2020-08-20 RX ORDER — LEVOTHYROXINE SODIUM 0.03 MG/1
25 TABLET ORAL DAILY
Qty: 30 TABLET | Refills: 5 | Status: SHIPPED | OUTPATIENT
Start: 2020-08-20 | End: 2021-03-01

## 2020-08-20 RX ORDER — PROMETHAZINE HYDROCHLORIDE 25 MG/1
TABLET ORAL
Qty: 30 TABLET | Refills: 2 | Status: SHIPPED | OUTPATIENT
Start: 2020-08-20 | End: 2020-10-19 | Stop reason: SDUPTHER

## 2020-08-20 RX ORDER — HYDROCODONE BITARTRATE AND ACETAMINOPHEN 7.5; 325 MG/1; MG/1
1 TABLET ORAL EVERY 6 HOURS PRN
Qty: 100 TABLET | Refills: 0 | Status: SHIPPED | OUTPATIENT
Start: 2020-08-20 | End: 2020-09-17 | Stop reason: SDUPTHER

## 2020-08-20 NOTE — PROGRESS NOTES
Subjective   Prema Curtis is a 60 y.o. female.   This visit has been rescheduled as a phone visit to comply with patient safety concerns in accordance with CDC recommendations. Total time of discussion was 21 minutes.    You have chosen to receive care through a telephone visit. Do you consent to use a telephone visit for your medical care today? Yes  Patient following up on diabetes, arthritis, alpha gal.  She is very compliant with diet and monitoring of her blood sugar.  The highest she has had is 131.      She is still on the mammal-free diet, last alpha gal titers checked in May.     She is having a flare-up of arthritis lately.  She does have ibuprofen to take but isn't taking it regularly.    History of Present Illness  Arthritis   Presents for follow-up visit. Complains of pain, stiffness and joint swelling, reports no joint warmth. The symptoms have been worsening. Affected location: diffuse. Pain is at a severity of 8/10. Associated symptoms include fatigue, pain at night and pain while resting. Pertinent negatives include no diarrhea, dry eyes, dry mouth, dysuria, fever, rash, Raynaud's syndrome, uveitis or weight loss. Compliance with total regimen is %.     The following portions of the patient's history were reviewed and updated as appropriate: allergies, current medications, past family history, past medical history, past social history, past surgical history and problem list.    Review of Systems   Constitutional: Negative.    HENT: Negative for postnasal drip and sneezing.    Respiratory: Negative for shortness of breath.    Cardiovascular: Negative.    Gastrointestinal: Negative.    Musculoskeletal: Positive for arthralgias.   Skin: Positive for rash.   Allergic/Immunologic: Positive for food allergies. Negative for immunocompromised state.   Neurological: Negative for syncope.   Hematological: Negative.    Psychiatric/Behavioral: Negative for agitation, dysphoric mood and sleep  disturbance.   All other systems reviewed and are negative.      Objective   Physical Exam    Assessment/Plan   Prema was seen today for med refill.    Diagnoses and all orders for this visit:    Multiple joint pain  -     HYDROcodone-acetaminophen (NORCO) 7.5-325 MG per tablet; Take 1 tablet by mouth Every 6 (Six) Hours As Needed for Moderate Pain .    Nausea  -     promethazine (PHENERGAN) 25 MG tablet; Take 1 tablet by mouth every 8 hours as needed for nausea or vomiting    Essential hypertension  -     CBC & Differential; Future  -     Comprehensive Metabolic Panel    Hypothyroidism, unspecified type  -     levothyroxine (SYNTHROID, LEVOTHROID) 25 MCG tablet; Take 1 tablet by mouth Daily.  -     T4, Free  -     TSH    Familial hypercholesterolemia  -     rosuvastatin (Crestor) 5 MG tablet; Take 1 tablet by mouth Daily.  -     Lipid Panel; Future    Type 2 diabetes mellitus without complication, unspecified whether long term insulin use (CMS/[facility])  -     Empagliflozin (Jardiance) 25 MG tablet; Take 25 mg by mouth Every Morning Before Breakfast.  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  I will continue to see patient for regular follow up appointments. Patient is well controlled on the medication.  SHYALA has been reviewed by me and is updated every 3 months. The patient is aware of the potential for addiction and dependence.     Labs due next visit.    Continue on Norco for arthritis pain and have instructed her for the arthritis flareup to take the ibuprofen once or twice daily for 4 or 5 days and see if this does not help stop the stiffness and pain.    Continue Crestor for lipids    Continue current dose of Synthroid.    Continue current antihypertensive medication    We will check alpha gal titers with next  lab        This document has been electronically signed by Olga Rojas MD on August 20, 2020 13:08      Continue with current diabetes medications and testing of blood sugars at least daily and prn.  Encourage compliance with diabetic diet.

## 2020-09-17 DIAGNOSIS — M25.50 MULTIPLE JOINT PAIN: ICD-10-CM

## 2020-09-17 RX ORDER — HYDROCODONE BITARTRATE AND ACETAMINOPHEN 7.5; 325 MG/1; MG/1
1 TABLET ORAL EVERY 6 HOURS PRN
Qty: 100 TABLET | Refills: 0 | Status: SHIPPED | OUTPATIENT
Start: 2020-09-17 | End: 2020-10-19 | Stop reason: SDUPTHER

## 2020-09-25 DIAGNOSIS — Z12.31 ENCOUNTER FOR SCREENING MAMMOGRAM FOR MALIGNANT NEOPLASM OF BREAST: Primary | ICD-10-CM

## 2020-10-02 RX ORDER — NYSTATIN 100000 [USP'U]/G
POWDER TOPICAL 4 TIMES DAILY
Qty: 30 G | Refills: 5 | Status: SHIPPED | OUTPATIENT
Start: 2020-10-02 | End: 2021-02-03

## 2020-10-19 DIAGNOSIS — R11.0 NAUSEA: ICD-10-CM

## 2020-10-19 DIAGNOSIS — M25.50 MULTIPLE JOINT PAIN: ICD-10-CM

## 2020-10-19 RX ORDER — HYDROCODONE BITARTRATE AND ACETAMINOPHEN 7.5; 325 MG/1; MG/1
1 TABLET ORAL EVERY 6 HOURS PRN
Qty: 100 TABLET | Refills: 0 | Status: SHIPPED | OUTPATIENT
Start: 2020-10-19 | End: 2020-11-17 | Stop reason: SDUPTHER

## 2020-10-19 RX ORDER — PROMETHAZINE HYDROCHLORIDE 25 MG/1
TABLET ORAL
Qty: 30 TABLET | Refills: 2 | Status: SHIPPED | OUTPATIENT
Start: 2020-10-19 | End: 2020-10-21

## 2020-10-20 DIAGNOSIS — R11.0 NAUSEA: ICD-10-CM

## 2020-10-21 RX ORDER — PROMETHAZINE HYDROCHLORIDE 25 MG/1
TABLET ORAL
Qty: 30 TABLET | Refills: 2 | Status: SHIPPED | OUTPATIENT
Start: 2020-10-21 | End: 2020-11-17 | Stop reason: SDUPTHER

## 2020-10-28 RX ORDER — LISINOPRIL 10 MG/1
10 TABLET ORAL DAILY
Qty: 30 TABLET | Refills: 5 | Status: SHIPPED | OUTPATIENT
Start: 2020-10-28

## 2020-11-09 ENCOUNTER — LAB (OUTPATIENT)
Dept: LAB | Facility: OTHER | Age: 60
End: 2020-11-09

## 2020-11-09 DIAGNOSIS — Z91.018 ALLERGY TO GALACTOSE-ALPHA-1,3-GALACTOSE: ICD-10-CM

## 2020-11-09 DIAGNOSIS — L50.8 OTHER URTICARIA: ICD-10-CM

## 2020-11-09 DIAGNOSIS — I10 ESSENTIAL HYPERTENSION: ICD-10-CM

## 2020-11-09 DIAGNOSIS — E78.01 FAMILIAL HYPERCHOLESTEROLEMIA: ICD-10-CM

## 2020-11-09 LAB
ALBUMIN SERPL-MCNC: 4.5 G/DL (ref 3.5–5)
ALBUMIN/GLOB SERPL: 1.2 G/DL (ref 1.1–1.8)
ALP SERPL-CCNC: 85 U/L (ref 38–126)
ALT SERPL W P-5'-P-CCNC: 15 U/L
ANION GAP SERPL CALCULATED.3IONS-SCNC: 8 MMOL/L (ref 5–15)
AST SERPL-CCNC: 20 U/L (ref 14–36)
BASOPHILS # BLD AUTO: 0.04 10*3/MM3 (ref 0–0.2)
BASOPHILS NFR BLD AUTO: 0.5 % (ref 0–1.5)
BILIRUB SERPL-MCNC: 0.7 MG/DL (ref 0.2–1.3)
BUN SERPL-MCNC: 18 MG/DL (ref 7–23)
BUN/CREAT SERPL: 23.7 (ref 7–25)
CALCIUM SPEC-SCNC: 9.7 MG/DL (ref 8.4–10.2)
CHLORIDE SERPL-SCNC: 104 MMOL/L (ref 101–112)
CHOLEST SERPL-MCNC: 172 MG/DL (ref 150–200)
CO2 SERPL-SCNC: 28 MMOL/L (ref 22–30)
CREAT SERPL-MCNC: 0.76 MG/DL (ref 0.52–1.04)
DEPRECATED RDW RBC AUTO: 45.1 FL (ref 37–54)
EOSINOPHIL # BLD AUTO: 0.37 10*3/MM3 (ref 0–0.4)
EOSINOPHIL NFR BLD AUTO: 4.9 % (ref 0.3–6.2)
ERYTHROCYTE [DISTWIDTH] IN BLOOD BY AUTOMATED COUNT: 13.8 % (ref 12.3–15.4)
GFR SERPL CREATININE-BSD FRML MDRD: 77 ML/MIN/1.73 (ref 45–104)
GLOBULIN UR ELPH-MCNC: 3.7 GM/DL (ref 2.3–3.5)
GLUCOSE SERPL-MCNC: 135 MG/DL (ref 70–99)
HBA1C MFR BLD: 6.4 % (ref 4.8–5.6)
HCT VFR BLD AUTO: 43.4 % (ref 34–46.6)
HDLC SERPL-MCNC: 48 MG/DL (ref 40–59)
HGB BLD-MCNC: 13.8 G/DL (ref 12–15.9)
LDLC SERPL CALC-MCNC: 99 MG/DL
LDLC/HDLC SERPL: 1.99 {RATIO} (ref 0–3.22)
LYMPHOCYTES # BLD AUTO: 2.05 10*3/MM3 (ref 0.7–3.1)
LYMPHOCYTES NFR BLD AUTO: 27.3 % (ref 19.6–45.3)
MCH RBC QN AUTO: 28.9 PG (ref 26.6–33)
MCHC RBC AUTO-ENTMCNC: 31.8 G/DL (ref 31.5–35.7)
MCV RBC AUTO: 91 FL (ref 79–97)
MONOCYTES # BLD AUTO: 0.7 10*3/MM3 (ref 0.1–0.9)
MONOCYTES NFR BLD AUTO: 9.3 % (ref 5–12)
NEUTROPHILS NFR BLD AUTO: 4.35 10*3/MM3 (ref 1.7–7)
NEUTROPHILS NFR BLD AUTO: 58 % (ref 42.7–76)
PLATELET # BLD AUTO: 297 10*3/MM3 (ref 140–450)
PMV BLD AUTO: 10.5 FL (ref 6–12)
POTASSIUM SERPL-SCNC: 4.2 MMOL/L (ref 3.4–5)
PROT SERPL-MCNC: 8.2 G/DL (ref 6.3–8.6)
RBC # BLD AUTO: 4.77 10*6/MM3 (ref 3.77–5.28)
SODIUM SERPL-SCNC: 140 MMOL/L (ref 137–145)
T4 FREE SERPL-MCNC: 1.22 NG/DL (ref 0.93–1.7)
TRIGL SERPL-MCNC: 142 MG/DL
TSH SERPL DL<=0.05 MIU/L-ACNC: 3.39 UIU/ML (ref 0.27–4.2)
VLDLC SERPL-MCNC: 25 MG/DL (ref 5–40)
WBC # BLD AUTO: 7.51 10*3/MM3 (ref 3.4–10.8)

## 2020-11-09 PROCEDURE — 86008 ALLG SPEC IGE RECOMB EA: CPT | Performed by: FAMILY MEDICINE

## 2020-11-09 PROCEDURE — 84439 ASSAY OF FREE THYROXINE: CPT | Performed by: FAMILY MEDICINE

## 2020-11-09 PROCEDURE — 84443 ASSAY THYROID STIM HORMONE: CPT | Performed by: FAMILY MEDICINE

## 2020-11-09 PROCEDURE — 80053 COMPREHEN METABOLIC PANEL: CPT | Performed by: FAMILY MEDICINE

## 2020-11-09 PROCEDURE — 85025 COMPLETE CBC W/AUTO DIFF WBC: CPT | Performed by: FAMILY MEDICINE

## 2020-11-09 PROCEDURE — 86003 ALLG SPEC IGE CRUDE XTRC EA: CPT | Performed by: FAMILY MEDICINE

## 2020-11-09 PROCEDURE — 83036 HEMOGLOBIN GLYCOSYLATED A1C: CPT | Performed by: FAMILY MEDICINE

## 2020-11-09 PROCEDURE — 80061 LIPID PANEL: CPT | Performed by: FAMILY MEDICINE

## 2020-11-09 PROCEDURE — 36415 COLL VENOUS BLD VENIPUNCTURE: CPT | Performed by: FAMILY MEDICINE

## 2020-11-12 LAB
ALPHA-GAL IGE QN: 0.32 KU/L
BEEF IGE QN: 0.18 KU/L
DEPRECATED BEEF IGE RAST QL: ABNORMAL
DEPRECATED LAMB IGE RAST QL: 0
DEPRECATED PORK IGE RAST QL: 0
LAMB IGE QN: <0.1 KU/L
PORK IGE QN: <0.1 KU/L

## 2020-11-17 DIAGNOSIS — R11.0 NAUSEA: ICD-10-CM

## 2020-11-17 DIAGNOSIS — M25.50 MULTIPLE JOINT PAIN: ICD-10-CM

## 2020-11-17 RX ORDER — PROMETHAZINE HYDROCHLORIDE 25 MG/1
25 TABLET ORAL EVERY 8 HOURS PRN
Qty: 30 TABLET | Refills: 2 | Status: SHIPPED | OUTPATIENT
Start: 2020-11-17 | End: 2020-12-15

## 2020-11-17 RX ORDER — HYDROCODONE BITARTRATE AND ACETAMINOPHEN 7.5; 325 MG/1; MG/1
1 TABLET ORAL EVERY 6 HOURS PRN
Qty: 100 TABLET | Refills: 0 | Status: SHIPPED | OUTPATIENT
Start: 2020-11-17 | End: 2020-12-14 | Stop reason: SDUPTHER

## 2020-11-19 ENCOUNTER — OFFICE VISIT (OUTPATIENT)
Dept: FAMILY MEDICINE CLINIC | Facility: CLINIC | Age: 60
End: 2020-11-19

## 2020-11-19 VITALS — HEIGHT: 60 IN | BODY MASS INDEX: 54.19 KG/M2 | WEIGHT: 276 LBS

## 2020-11-19 DIAGNOSIS — Z91.018 ALLERGY TO GALACTOSE-ALPHA-1,3-GALACTOSE: ICD-10-CM

## 2020-11-19 DIAGNOSIS — E11.9 TYPE 2 DIABETES MELLITUS WITHOUT COMPLICATION, UNSPECIFIED WHETHER LONG TERM INSULIN USE (HCC): Primary | ICD-10-CM

## 2020-11-19 DIAGNOSIS — I10 ESSENTIAL HYPERTENSION: ICD-10-CM

## 2020-11-19 PROCEDURE — G2025 DIS SITE TELE SVCS RHC/FQHC: HCPCS | Performed by: FAMILY MEDICINE

## 2020-11-19 NOTE — PROGRESS NOTES
Subjective   Prema Curtis is a 60 y.o. female.   This visit has been rescheduled as a phone visit to comply with patient safety concerns in accordance with CDC recommendations. Total time of discussion was 21 minutes.    You have chosen to receive care through a telephone visit. Do you consent to use a telephone visit for your medical care today? Yes  Patient following up on diabetes, arthritis, alpha gal.  She is very compliant with diet and monitoring of her blood sugar.    Her recent titers are 0.3 and we discussed doing an alpha gal meat challenge test.  She has not had any reactions but has been avoiding the meat products.    Her blood sugars are still doing well, the highest being around 133 but usually 110-115.  She is very compliant with diet.    She has been cutting her blood pressure pill in half and she felt like it made her little dizzy but says her blood pressures are still at goal 130/80 or less at this dose    History of Present Illness  Arthritis   Presents for follow-up visit. Complains of pain, stiffness and joint swelling, reports no joint warmth. The symptoms have been worsening. Affected location: diffuse. Pain is at a severity of 8/10. Associated symptoms include fatigue, pain at night and pain while resting. Pertinent negatives include no diarrhea, dry eyes, dry mouth, dysuria, fever, rash, Raynaud's syndrome, uveitis or weight loss. Compliance with total regimen is %.     The following portions of the patient's history were reviewed and updated as appropriate: allergies, current medications, past family history, past medical history, past social history, past surgical history and problem list.    Review of Systems   Constitutional: Negative.    HENT: Negative for postnasal drip and sneezing.    Respiratory: Negative for shortness of breath.    Cardiovascular: Negative.    Gastrointestinal: Negative.    Musculoskeletal: Positive for arthralgias.   Skin: Positive for rash.      Allergic/Immunologic: Positive for food allergies. Negative for immunocompromised state.   Neurological: Negative for syncope.   Hematological: Negative.    Psychiatric/Behavioral: Negative for agitation, dysphoric mood and sleep disturbance.   All other systems reviewed and are negative.      Objective   Physical Exam    Assessment/Plan   Diagnoses and all orders for this visit:    1. Type 2 diabetes mellitus without complication, unspecified whether long term insulin use (CMS/[facility]) (Primary)    2. 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 Essential hypertension    She will do the meat challenge.  We instructed her to have a family member with her the whole time.  Her brother says he will stay with her.  She is to eat a sausage biscuit and wait 6 hours with observation, keeping her EpiPen and Benadryl with her.  For any sign of reactions treat accordingly and/or call 911 but with her titers this low in the pork and beef titers actually negative now I think she should be fine.    Continue with current diabetes medications and testing of blood sugars at least daily and prn.  Encourage compliance with diabetic diet.     Continue with the half dose of the blood pressure pill.        This document has been electronically signed by Olga Rojas MD on November 19, 2020 13:46 CST      Continue with current diabetes medications and testing of blood sugars at least daily and prn.  Encourage compliance with diabetic diet.

## 2020-11-20 DIAGNOSIS — E11.9 TYPE 2 DIABETES MELLITUS WITHOUT COMPLICATIONS (HCC): ICD-10-CM

## 2020-11-20 RX ORDER — PERPHENAZINE 16 MG/1
TABLET, FILM COATED ORAL
Qty: 50 EACH | Refills: 5 | Status: SHIPPED | OUTPATIENT
Start: 2020-11-20 | End: 2021-02-08 | Stop reason: SDUPTHER

## 2020-11-23 RX ORDER — TRIAMCINOLONE ACETONIDE 1 MG/G
CREAM TOPICAL 2 TIMES DAILY
Qty: 80 G | Refills: 5 | Status: SHIPPED | OUTPATIENT
Start: 2020-11-23

## 2020-12-14 DIAGNOSIS — M25.50 MULTIPLE JOINT PAIN: ICD-10-CM

## 2020-12-14 RX ORDER — HYDROCODONE BITARTRATE AND ACETAMINOPHEN 7.5; 325 MG/1; MG/1
1 TABLET ORAL EVERY 6 HOURS PRN
Qty: 100 TABLET | Refills: 0 | Status: SHIPPED | OUTPATIENT
Start: 2020-12-14 | End: 2021-01-11 | Stop reason: SDUPTHER

## 2020-12-15 DIAGNOSIS — R11.0 NAUSEA: ICD-10-CM

## 2020-12-15 RX ORDER — PROMETHAZINE HYDROCHLORIDE 25 MG/1
TABLET ORAL
Qty: 30 TABLET | Refills: 2 | Status: SHIPPED | OUTPATIENT
Start: 2020-12-15 | End: 2021-01-11 | Stop reason: SDUPTHER

## 2020-12-22 RX ORDER — IBUPROFEN 800 MG/1
800 TABLET ORAL 3 TIMES DAILY PRN
Qty: 90 TABLET | Refills: 3 | Status: SHIPPED | OUTPATIENT
Start: 2020-12-22

## 2021-01-11 DIAGNOSIS — M25.50 MULTIPLE JOINT PAIN: ICD-10-CM

## 2021-01-11 DIAGNOSIS — R11.0 NAUSEA: ICD-10-CM

## 2021-01-11 RX ORDER — PROMETHAZINE HYDROCHLORIDE 25 MG/1
25 TABLET ORAL EVERY 8 HOURS PRN
Qty: 30 TABLET | Refills: 2 | Status: SHIPPED | OUTPATIENT
Start: 2021-01-11 | End: 2021-02-12 | Stop reason: SDUPTHER

## 2021-01-11 RX ORDER — HYDROCODONE BITARTRATE AND ACETAMINOPHEN 7.5; 325 MG/1; MG/1
1 TABLET ORAL EVERY 6 HOURS PRN
Qty: 100 TABLET | Refills: 0 | Status: SHIPPED | OUTPATIENT
Start: 2021-01-11 | End: 2021-01-13 | Stop reason: SDUPTHER

## 2021-01-13 DIAGNOSIS — M25.50 MULTIPLE JOINT PAIN: ICD-10-CM

## 2021-01-13 RX ORDER — HYDROCODONE BITARTRATE AND ACETAMINOPHEN 7.5; 325 MG/1; MG/1
1 TABLET ORAL EVERY 6 HOURS PRN
Qty: 100 TABLET | Refills: 0 | Status: SHIPPED | OUTPATIENT
Start: 2021-01-13 | End: 2021-02-08 | Stop reason: SDUPTHER

## 2021-02-03 RX ORDER — NYSTATIN 100000 [USP'U]/G
POWDER TOPICAL
Qty: 30 G | Refills: 5 | Status: SHIPPED | OUTPATIENT
Start: 2021-02-03 | End: 2022-02-25

## 2021-02-08 DIAGNOSIS — E11.9 TYPE 2 DIABETES MELLITUS WITHOUT COMPLICATIONS (HCC): ICD-10-CM

## 2021-02-08 DIAGNOSIS — M25.50 MULTIPLE JOINT PAIN: ICD-10-CM

## 2021-02-08 RX ORDER — PERPHENAZINE 16 MG/1
1 TABLET, FILM COATED ORAL DAILY
Qty: 50 EACH | Refills: 5 | Status: SHIPPED | OUTPATIENT
Start: 2021-02-08 | End: 2021-02-09 | Stop reason: SDUPTHER

## 2021-02-08 RX ORDER — HYDROCODONE BITARTRATE AND ACETAMINOPHEN 7.5; 325 MG/1; MG/1
1 TABLET ORAL EVERY 6 HOURS PRN
Qty: 100 TABLET | Refills: 0 | Status: SHIPPED | OUTPATIENT
Start: 2021-02-08 | End: 2021-03-04 | Stop reason: SDUPTHER

## 2021-02-09 DIAGNOSIS — E11.9 TYPE 2 DIABETES MELLITUS WITHOUT COMPLICATIONS (HCC): ICD-10-CM

## 2021-02-09 RX ORDER — PERPHENAZINE 16 MG/1
1 TABLET, FILM COATED ORAL DAILY
Qty: 50 EACH | Refills: 5 | Status: SHIPPED | OUTPATIENT
Start: 2021-02-09 | End: 2021-02-17 | Stop reason: SDUPTHER

## 2021-02-12 DIAGNOSIS — R11.0 NAUSEA: ICD-10-CM

## 2021-02-12 RX ORDER — PROMETHAZINE HYDROCHLORIDE 25 MG/1
TABLET ORAL
Qty: 30 TABLET | Refills: 2 | Status: SHIPPED | OUTPATIENT
Start: 2021-02-12 | End: 2021-04-05 | Stop reason: SDUPTHER

## 2021-02-17 ENCOUNTER — OFFICE VISIT (OUTPATIENT)
Dept: FAMILY MEDICINE CLINIC | Facility: CLINIC | Age: 61
End: 2021-02-17

## 2021-02-17 VITALS — HEIGHT: 60 IN | BODY MASS INDEX: 54.19 KG/M2 | WEIGHT: 276 LBS

## 2021-02-17 DIAGNOSIS — E11.8 TYPE 2 DIABETES MELLITUS WITH COMPLICATION, WITHOUT LONG-TERM CURRENT USE OF INSULIN (HCC): ICD-10-CM

## 2021-02-17 DIAGNOSIS — M54.9 MID BACK PAIN: Primary | ICD-10-CM

## 2021-02-17 DIAGNOSIS — G89.29 CHRONIC PAIN OF RIGHT KNEE: ICD-10-CM

## 2021-02-17 DIAGNOSIS — M25.561 CHRONIC PAIN OF RIGHT KNEE: ICD-10-CM

## 2021-02-17 DIAGNOSIS — E66.01 MORBID OBESITY, UNSPECIFIED OBESITY TYPE (HCC): ICD-10-CM

## 2021-02-17 DIAGNOSIS — M54.50 ACUTE BILATERAL LOW BACK PAIN WITHOUT SCIATICA: ICD-10-CM

## 2021-02-17 PROCEDURE — G2025 DIS SITE TELE SVCS RHC/FQHC: HCPCS | Performed by: FAMILY MEDICINE

## 2021-02-17 RX ORDER — PERPHENAZINE 16 MG/1
TABLET, FILM COATED ORAL
Qty: 100 EACH | Refills: 5 | Status: SHIPPED | OUTPATIENT
Start: 2021-02-17 | End: 2021-02-17 | Stop reason: SDUPTHER

## 2021-02-17 RX ORDER — PERPHENAZINE 16 MG/1
TABLET, FILM COATED ORAL
Qty: 100 EACH | Refills: 5 | Status: SHIPPED | OUTPATIENT
Start: 2021-02-17 | End: 2021-08-30 | Stop reason: SDUPTHER

## 2021-02-17 NOTE — PROGRESS NOTES
Subjective   Prema Curtis is a 61 y.o. female.   This visit has been rescheduled as a phone visit to comply with patient safety concerns in accordance with CDC recommendations. Total time of discussion was 21 minutes.    You have chosen to receive care through a telephone visit. Do you consent to use a telephone visit for your medical care today? Yes    She has been having some pain in the low back, across both sides.  Other times she has pain between the shoulder blades.    She still has pain and popping in her right knee.  She had a fall about a year ago and has had some pain and stiffness since that time.    Her blood sugars are a bit labile, which she attributes to occasional dietary compliance issues.  She is only getting enough test strips to check her sugar once a day at most.     History of Present Illness  Arthritis   Presents for follow-up visit. Complains of pain, stiffness and joint swelling, reports no joint warmth. The symptoms have been worsening. Affected location: diffuse. Pain is at a severity of 8/10. Associated symptoms include fatigue, pain at night and pain while resting. Pertinent negatives include no diarrhea, dry eyes, dry mouth, dysuria, fever, rash, Raynaud's syndrome, uveitis or weight loss. Compliance with total regimen is %.     The following portions of the patient's history were reviewed and updated as appropriate: allergies, current medications, past family history, past medical history, past social history, past surgical history and problem list.    Review of Systems   Constitutional: Negative.    HENT: Negative for postnasal drip and sneezing.    Respiratory: Negative for shortness of breath.    Cardiovascular: Negative.    Gastrointestinal: Negative.    Musculoskeletal: Positive for arthralgias.   Skin: Positive for rash.   Allergic/Immunologic: Positive for food allergies. Negative for immunocompromised state.   Neurological: Negative for syncope.   Hematological:  Negative.    Psychiatric/Behavioral: Negative for agitation, dysphoric mood and sleep disturbance.   All other systems reviewed and are negative.      Objective    Body mass index is 53.9 kg/m².    Physical Exam    Assessment/Plan   Diagnoses and all orders for this visit:    1. Mid back pain (Primary)  -     XR Spine Thoracic 2 View    2. Acute bilateral low back pain without sciatica  -     XR Spine Lumbar 2 or 3 View; Future    3. Type 2 diabetes mellitus with complication, without long-term current use of insulin (CMS/[facility])  -     glucose blood (Contour Next Test) test strip; Test FSBS TID and prn due to labile blood sugars  Dispense: 100 each; Refill: 5    4. Morbid obesity, unspecified obesity type (CMS/[facility])    5. Chronic pain of right knee  -     XR Knee 3 View Right; Future    Other orders  -     Discontinue: glucose blood (Contour Next Test) test strip; Test FSBS TID and prn due to labile blood sugars  Dispense: 100 each; Refill: 5    Continue with current diabetes medications and testing of blood sugars at least daily and prn.  Encourage compliance with diabetic diet.  We will see if we can get her insurance to pay for nerve test strips to test up to 2 or 3 times daily as she is very careful about monitoring sugars and trying to do better with her diet.  She will get an A1c in May    We will have her come in for x-rays of the mid back and low back and also the knee and will follow-up accordingly    Discussed the patient's BMI with her. BMI is above normal parameters. Follow-up plan includes:  educational material and nutrition counseling.          This document has been electronically signed by Olga Rojas MD on February 17, 2021 09:04 CST

## 2021-02-22 NOTE — PROGRESS NOTES
Please call the patient regarding her abnormal result.  Tell her the radiologist sees arthritis but otherwise is okay

## 2021-02-28 DIAGNOSIS — E11.9 TYPE 2 DIABETES MELLITUS WITHOUT COMPLICATION, UNSPECIFIED WHETHER LONG TERM INSULIN USE (HCC): ICD-10-CM

## 2021-02-28 DIAGNOSIS — E03.9 HYPOTHYROIDISM, UNSPECIFIED TYPE: ICD-10-CM

## 2021-03-01 RX ORDER — LEVOTHYROXINE SODIUM 0.03 MG/1
25 TABLET ORAL DAILY
Qty: 30 TABLET | Refills: 11 | Status: SHIPPED | OUTPATIENT
Start: 2021-03-01 | End: 2022-02-16

## 2021-03-01 RX ORDER — EMPAGLIFLOZIN 25 MG/1
TABLET, FILM COATED ORAL
Qty: 30 TABLET | Refills: 11 | Status: SHIPPED | OUTPATIENT
Start: 2021-03-01 | End: 2022-02-16

## 2021-03-04 DIAGNOSIS — M25.50 MULTIPLE JOINT PAIN: ICD-10-CM

## 2021-03-04 RX ORDER — HYDROCODONE BITARTRATE AND ACETAMINOPHEN 7.5; 325 MG/1; MG/1
1 TABLET ORAL EVERY 6 HOURS PRN
Qty: 100 TABLET | Refills: 0 | Status: SHIPPED | OUTPATIENT
Start: 2021-03-04 | End: 2021-04-05 | Stop reason: SDUPTHER

## 2021-03-22 DIAGNOSIS — I10 BENIGN ESSENTIAL HTN: ICD-10-CM

## 2021-03-22 DIAGNOSIS — L50.0 ALLERGIC URTICARIA: ICD-10-CM

## 2021-03-22 DIAGNOSIS — E11.65 TYPE 2 DIABETES MELLITUS WITH HYPERGLYCEMIA, UNSPECIFIED WHETHER LONG TERM INSULIN USE (HCC): ICD-10-CM

## 2021-03-22 DIAGNOSIS — Z91.018 ALLERGY TO ALPHA-GAL: Primary | ICD-10-CM

## 2021-04-05 DIAGNOSIS — R11.0 NAUSEA: ICD-10-CM

## 2021-04-05 DIAGNOSIS — M25.50 MULTIPLE JOINT PAIN: ICD-10-CM

## 2021-04-05 RX ORDER — HYDROCODONE BITARTRATE AND ACETAMINOPHEN 7.5; 325 MG/1; MG/1
1 TABLET ORAL EVERY 6 HOURS PRN
Qty: 100 TABLET | Refills: 0 | Status: SHIPPED | OUTPATIENT
Start: 2021-04-05 | End: 2021-05-03 | Stop reason: SDUPTHER

## 2021-04-05 RX ORDER — PROMETHAZINE HYDROCHLORIDE 25 MG/1
25 TABLET ORAL EVERY 8 HOURS PRN
Qty: 30 TABLET | Refills: 2 | Status: SHIPPED | OUTPATIENT
Start: 2021-04-05 | End: 2021-04-15

## 2021-04-06 RX ORDER — ROSUVASTATIN CALCIUM 5 MG/1
5 TABLET, COATED ORAL DAILY
Qty: 30 TABLET | Refills: 5 | Status: SHIPPED | OUTPATIENT
Start: 2021-04-06 | End: 2021-08-30

## 2021-04-14 DIAGNOSIS — R11.0 NAUSEA: ICD-10-CM

## 2021-04-15 RX ORDER — PROMETHAZINE HYDROCHLORIDE 25 MG/1
TABLET ORAL
Qty: 30 TABLET | Refills: 2 | Status: SHIPPED | OUTPATIENT
Start: 2021-04-15 | End: 2021-06-02 | Stop reason: SDUPTHER

## 2021-05-03 DIAGNOSIS — M25.50 MULTIPLE JOINT PAIN: ICD-10-CM

## 2021-05-03 RX ORDER — HYDROCODONE BITARTRATE AND ACETAMINOPHEN 7.5; 325 MG/1; MG/1
1 TABLET ORAL EVERY 6 HOURS PRN
Qty: 100 TABLET | Refills: 0 | Status: SHIPPED | OUTPATIENT
Start: 2021-05-03 | End: 2021-05-17 | Stop reason: SDUPTHER

## 2021-05-10 ENCOUNTER — LAB (OUTPATIENT)
Dept: LAB | Facility: OTHER | Age: 61
End: 2021-05-10

## 2021-05-10 DIAGNOSIS — L50.0 ALLERGIC URTICARIA: ICD-10-CM

## 2021-05-10 DIAGNOSIS — E11.65 TYPE 2 DIABETES MELLITUS WITH HYPERGLYCEMIA, UNSPECIFIED WHETHER LONG TERM INSULIN USE (HCC): ICD-10-CM

## 2021-05-10 DIAGNOSIS — Z91.018 ALLERGY TO ALPHA-GAL: ICD-10-CM

## 2021-05-10 DIAGNOSIS — I10 BENIGN ESSENTIAL HTN: ICD-10-CM

## 2021-05-10 LAB
ALBUMIN SERPL-MCNC: 4 G/DL (ref 3.5–5)
ALBUMIN/GLOB SERPL: 1.3 G/DL (ref 1.1–1.8)
ALP SERPL-CCNC: 87 U/L (ref 38–126)
ALT SERPL W P-5'-P-CCNC: 17 U/L
ANION GAP SERPL CALCULATED.3IONS-SCNC: 7 MMOL/L (ref 5–15)
AST SERPL-CCNC: 18 U/L (ref 14–36)
BASOPHILS # BLD AUTO: 0.05 10*3/MM3 (ref 0–0.2)
BASOPHILS NFR BLD AUTO: 0.6 % (ref 0–1.5)
BILIRUB SERPL-MCNC: 0.5 MG/DL (ref 0.2–1.3)
BUN SERPL-MCNC: 13 MG/DL (ref 7–23)
BUN/CREAT SERPL: 16.9 (ref 7–25)
CALCIUM SPEC-SCNC: 8.8 MG/DL (ref 8.4–10.2)
CHLORIDE SERPL-SCNC: 107 MMOL/L (ref 101–112)
CHOLEST SERPL-MCNC: 160 MG/DL (ref 150–200)
CO2 SERPL-SCNC: 29 MMOL/L (ref 22–30)
CREAT SERPL-MCNC: 0.77 MG/DL (ref 0.52–1.04)
DEPRECATED RDW RBC AUTO: 45.9 FL (ref 37–54)
EOSINOPHIL # BLD AUTO: 0.35 10*3/MM3 (ref 0–0.4)
EOSINOPHIL NFR BLD AUTO: 4.5 % (ref 0.3–6.2)
ERYTHROCYTE [DISTWIDTH] IN BLOOD BY AUTOMATED COUNT: 13.9 % (ref 12.3–15.4)
GFR SERPL CREATININE-BSD FRML MDRD: 76 ML/MIN/1.73 (ref 45–104)
GLOBULIN UR ELPH-MCNC: 3 GM/DL (ref 2.3–3.5)
GLUCOSE SERPL-MCNC: 146 MG/DL (ref 70–99)
HBA1C MFR BLD: 6.73 % (ref 4.8–5.6)
HCT VFR BLD AUTO: 42.2 % (ref 34–46.6)
HDLC SERPL-MCNC: 48 MG/DL (ref 40–59)
HGB BLD-MCNC: 13.5 G/DL (ref 12–15.9)
LDLC SERPL CALC-MCNC: 85 MG/DL
LDLC/HDLC SERPL: 1.68 {RATIO} (ref 0–3.22)
LYMPHOCYTES # BLD AUTO: 2.19 10*3/MM3 (ref 0.7–3.1)
LYMPHOCYTES NFR BLD AUTO: 28.2 % (ref 19.6–45.3)
MCH RBC QN AUTO: 29.7 PG (ref 26.6–33)
MCHC RBC AUTO-ENTMCNC: 32 G/DL (ref 31.5–35.7)
MCV RBC AUTO: 92.7 FL (ref 79–97)
MONOCYTES # BLD AUTO: 0.85 10*3/MM3 (ref 0.1–0.9)
MONOCYTES NFR BLD AUTO: 10.9 % (ref 5–12)
NEUTROPHILS NFR BLD AUTO: 4.33 10*3/MM3 (ref 1.7–7)
NEUTROPHILS NFR BLD AUTO: 55.8 % (ref 42.7–76)
PLATELET # BLD AUTO: 251 10*3/MM3 (ref 140–450)
PMV BLD AUTO: 10.8 FL (ref 6–12)
POTASSIUM SERPL-SCNC: 3.9 MMOL/L (ref 3.4–5)
PROT SERPL-MCNC: 7 G/DL (ref 6.3–8.6)
RBC # BLD AUTO: 4.55 10*6/MM3 (ref 3.77–5.28)
SODIUM SERPL-SCNC: 143 MMOL/L (ref 137–145)
T4 FREE SERPL-MCNC: 1.14 NG/DL (ref 0.93–1.7)
TRIGL SERPL-MCNC: 156 MG/DL
TSH SERPL DL<=0.05 MIU/L-ACNC: 3.87 UIU/ML (ref 0.27–4.2)
VLDLC SERPL-MCNC: 27 MG/DL (ref 5–40)
WBC # BLD AUTO: 7.77 10*3/MM3 (ref 3.4–10.8)

## 2021-05-10 PROCEDURE — 84443 ASSAY THYROID STIM HORMONE: CPT | Performed by: FAMILY MEDICINE

## 2021-05-10 PROCEDURE — 86003 ALLG SPEC IGE CRUDE XTRC EA: CPT | Performed by: FAMILY MEDICINE

## 2021-05-10 PROCEDURE — 85025 COMPLETE CBC W/AUTO DIFF WBC: CPT | Performed by: FAMILY MEDICINE

## 2021-05-10 PROCEDURE — 83036 HEMOGLOBIN GLYCOSYLATED A1C: CPT | Performed by: FAMILY MEDICINE

## 2021-05-10 PROCEDURE — 86008 ALLG SPEC IGE RECOMB EA: CPT | Performed by: FAMILY MEDICINE

## 2021-05-10 PROCEDURE — 36415 COLL VENOUS BLD VENIPUNCTURE: CPT | Performed by: FAMILY MEDICINE

## 2021-05-10 PROCEDURE — 80061 LIPID PANEL: CPT | Performed by: FAMILY MEDICINE

## 2021-05-10 PROCEDURE — 80053 COMPREHEN METABOLIC PANEL: CPT | Performed by: FAMILY MEDICINE

## 2021-05-10 PROCEDURE — 84439 ASSAY OF FREE THYROXINE: CPT | Performed by: FAMILY MEDICINE

## 2021-05-13 LAB
ALPHA-GAL IGE QN: 0.21 KU/L
BEEF IGE QN: 0.19 KU/L
DEPRECATED BEEF IGE RAST QL: ABNORMAL
DEPRECATED LAMB IGE RAST QL: 0
DEPRECATED PORK IGE RAST QL: 0
LAMB IGE QN: <0.1 KU/L
PORK IGE QN: <0.1 KU/L

## 2021-05-17 ENCOUNTER — OFFICE VISIT (OUTPATIENT)
Dept: FAMILY MEDICINE CLINIC | Facility: CLINIC | Age: 61
End: 2021-05-17

## 2021-05-17 VITALS
HEIGHT: 60 IN | SYSTOLIC BLOOD PRESSURE: 130 MMHG | WEIGHT: 239 LBS | DIASTOLIC BLOOD PRESSURE: 86 MMHG | BODY MASS INDEX: 46.92 KG/M2

## 2021-05-17 DIAGNOSIS — Z91.018 ALLERGY TO ALPHA-GAL: ICD-10-CM

## 2021-05-17 DIAGNOSIS — E11.65 TYPE 2 DIABETES MELLITUS WITH HYPERGLYCEMIA, UNSPECIFIED WHETHER LONG TERM INSULIN USE (HCC): Primary | ICD-10-CM

## 2021-05-17 DIAGNOSIS — B35.4 TINEA CORPORIS: ICD-10-CM

## 2021-05-17 DIAGNOSIS — M25.50 MULTIPLE JOINT PAIN: ICD-10-CM

## 2021-05-17 PROCEDURE — 99214 OFFICE O/P EST MOD 30 MIN: CPT | Performed by: FAMILY MEDICINE

## 2021-05-17 RX ORDER — HYDROCODONE BITARTRATE AND ACETAMINOPHEN 7.5; 325 MG/1; MG/1
1 TABLET ORAL EVERY 6 HOURS PRN
Qty: 120 TABLET | Refills: 0
Start: 2021-05-17 | End: 2021-06-02 | Stop reason: SDUPTHER

## 2021-05-17 NOTE — PROGRESS NOTES
"Subjective   Prema Curtis is a 61 y.o. female.  Patient here today for follow-up on chronic arthritis pain including low back and knees, alpha gal, and diabetes.  Her blood sugars are doing very well.  Still has a lot of pain.  Primarily was in the right knee in the past but now is also in the left knee.  Also she broke the nail off of her fifth toe on the left foot and it is quite tender.    She tends to get a rash under the breast especially in summertime and it is back and somewhat itchy.    History of Present Illness   The following portions of the patient's history were reviewed and updated as appropriate: allergies, current medications, past family history, past medical history, past social history, past surgical history and problem list.      Review of Systems   Constitutional: Negative.    HENT: Negative for postnasal drip and sneezing.    Cardiovascular: Negative.    Gastrointestinal: Negative.    Musculoskeletal: Positive for arthralgias.   Allergic/Immunologic: Positive for food allergies. Negative for immunocompromised state.   Neurological: Negative for syncope.   Hematological: Negative.    Psychiatric/Behavioral: Negative for agitation, dysphoric mood and sleep disturbance.   All other systems reviewed and are negative.    Objective    Vitals:    05/17/21 1049   BP: 130/86   Weight: 108 kg (239 lb)   Height: 152.4 cm (60\")     Body mass index is 46.68 kg/m².    Physical Exam   Constitutional: She is oriented to person, place, and time. She appears well-developed.   HENT:   Head: Normocephalic and atraumatic.   Nose: Nose normal.   Eyes: Pupils are equal, round, and reactive to light. Conjunctivae are normal.   Neck: No JVD present. No tracheal deviation present. No thyromegaly present.   Cardiovascular: Normal rate, regular rhythm and normal heart sounds.   No murmur heard.  Pulmonary/Chest: Effort normal and breath sounds normal. She has no wheezes.   Abdominal: Soft. Bowel sounds are normal. " She exhibits no distension. There is no abdominal tenderness.   Musculoskeletal: Tenderness present.      Comments: Decreased range of motion in the left shoulder in all directions with pain anteriorly   Lymphadenopathy:     She has no cervical adenopathy.   Neurological: She is alert and oriented to person, place, and time. She displays normal reflexes. Coordination abnormal.   Skin: Skin is warm and dry. No rash noted.   Nursing note and vitals reviewed.    Lab on 05/10/2021   Component Date Value Ref Range Status   • Glucose 05/10/2021 146* 70 - 99 mg/dL Final   • BUN 05/10/2021 13  7 - 23 mg/dL Final   • Creatinine 05/10/2021 0.77  0.52 - 1.04 mg/dL Final   • Sodium 05/10/2021 143  137 - 145 mmol/L Final   • Potassium 05/10/2021 3.9  3.4 - 5.0 mmol/L Final   • Chloride 05/10/2021 107  101 - 112 mmol/L Final   • CO2 05/10/2021 29.0  22.0 - 30.0 mmol/L Final   • Calcium 05/10/2021 8.8  8.4 - 10.2 mg/dL Final   • Total Protein 05/10/2021 7.0  6.3 - 8.6 g/dL Final   • Albumin 05/10/2021 4.00  3.50 - 5.00 g/dL Final   • ALT (SGPT) 05/10/2021 17  <=35 U/L Final   • AST (SGOT) 05/10/2021 18  14 - 36 U/L Final   • Alkaline Phosphatase 05/10/2021 87  38 - 126 U/L Final   • Total Bilirubin 05/10/2021 0.5  0.2 - 1.3 mg/dL Final   • eGFR Non African Amer 05/10/2021 76  45 - 104 mL/min/1.73 Final   • Globulin 05/10/2021 3.0  2.3 - 3.5 gm/dL Final   • A/G Ratio 05/10/2021 1.3  1.1 - 1.8 g/dL Final   • BUN/Creatinine Ratio 05/10/2021 16.9  7.0 - 25.0 Final   • Anion Gap 05/10/2021 7.0  5.0 - 15.0 mmol/L Final   • Hemoglobin A1C 05/10/2021 6.73* 4.80 - 5.60 % Final   • Total Cholesterol 05/10/2021 160  150 - 200 mg/dL Final   • Triglycerides 05/10/2021 156* <=150 mg/dL Final   • HDL Cholesterol 05/10/2021 48  40 - 59 mg/dL Final   • LDL Cholesterol  05/10/2021 85  <=100 mg/dL Final   • VLDL Cholesterol 05/10/2021 27  5 - 40 mg/dL Final   • LDL/HDL Ratio 05/10/2021 1.68  0.00 - 3.22 Final   • TSH 05/10/2021 3.870  0.270 - 4.200  uIU/mL Final   • Free T4 05/10/2021 1.14  0.93 - 1.70 ng/dL Final   • Beef 05/10/2021 0.19  <0.35 kU/L Final   • Class Description 05/10/2021 0/1   Final   • Saleem 05/10/2021 <0.10  <0.35 kU/L Final   • Class Interpretation 05/10/2021 0   Final   • Pork 05/10/2021 <0.10  <0.35 kU/L Final   • Class Interpretation 05/10/2021 0   Final    The test method is the [employer]inKauf ImmunoCAP allergen-specific  IgE system. CLASS INTERPRETATION   <0.10 kU/L= 0,  Negative; 0.10 - 0.34 kU/L= 0/1, Equivocal/Borderline;  0.35 - 0.69  kU/L=1, Low Positive; 0.70 - 3.49 kU/L=2,  Moderate Positive;  3.50  - 17.49 kU/L=3, High Positive;  17.50 - 49.99 kU/L= 4, Very High Positive; 50.00 - 99.99  kU/L= 5, Very High Positive;   >99.99 kU/L=6, Very High  Positive  *This test was developed and its performance  characteristics determined by [facility]. It has not  been cleared or approved by the [place]. Food and Drug  Administration.   • Alpha Gal IgE 05/10/2021 0.21* <0.10 kU/L Final    Previous reports (RICHARD 2009;123:426-433) have demonstrated  that patients with IgE antibodies to  [id number]-a-[id number],3-galactose are at risk for delayed  anaphylaxis, angioedema, or urticaria following  consumption of beef, pork, or lamb.   • WBC 05/10/2021 7.77  3.40 - 10.80 10*3/mm3 Final   • RBC 05/10/2021 4.55  3.77 - 5.28 10*6/mm3 Final   • Hemoglobin 05/10/2021 13.5  12.0 - 15.9 g/dL Final   • Hematocrit 05/10/2021 42.2  34.0 - 46.6 % Final   • MCV 05/10/2021 92.7  79.0 - 97.0 fL Final   • MCH 05/10/2021 29.7  26.6 - 33.0 pg Final   • MCHC 05/10/2021 32.0  31.5 - 35.7 g/dL Final   • RDW 05/10/2021 13.9  12.3 - 15.4 % Final   • RDW-SD 05/10/2021 45.9  37.0 - 54.0 fl Final   • MPV 05/10/2021 10.8  6.0 - 12.0 fL Final   • Platelets 05/10/2021 251  140 - 450 10*3/mm3 Final   • Neutrophil % 05/10/2021 55.8  42.7 - 76.0 % Final   • Lymphocyte % 05/10/2021 28.2  19.6 - 45.3 % Final   • Monocyte % 05/10/2021 10.9  5.0 - 12.0 % Final   • Eosinophil % 05/10/2021 4.5  0.3 -  6.2 % Final   • Basophil % 05/10/2021 0.6  0.0 - 1.5 % Final   • Neutrophils, Absolute 05/10/2021 4.33  1.70 - 7.00 10*3/mm3 Final   • Lymphocytes, Absolute 05/10/2021 2.19  0.70 - 3.10 10*3/mm3 Final   • Monocytes, Absolute 05/10/2021 0.85  0.10 - 0.90 10*3/mm3 Final   • Eosinophils, Absolute 05/10/2021 0.35  0.00 - 0.40 10*3/mm3 Final   • Basophils, Absolute 05/10/2021 0.05  0.00 - 0.20 10*3/mm3 Final     Assessment/Plan   Diagnoses and all orders for this visit:    1. Type 2 diabetes mellitus with hyperglycemia, unspecified whether long term insulin use (CMS/[facility]) (Primary)    2. Multiple joint pain  -     HYDROcodone-acetaminophen (NORCO) 7.5-325 MG per tablet; Take 1 tablet by mouth Every 6 (Six) Hours As Needed for Moderate Pain .  Dispense: 120 tablet; Refill: 0    3. Tinea corporis    4. Allergy to alpha-gal    Other orders  -     mupirocin (Bactroban) 2 % ointment; Apply  topically to the appropriate area as directed 3 (Three) Times a Day.  Dispense: 30 g; Refill: 5    She is going to come in at a later date for the meat challenge and bring her EpiPen with her, along with a sausage biscuit and be observed in my office as her alpha gal titers are 0.2.  We will follow-up with her at that time    She requests refill Bactroban    Recommend the Nizoral powder for the rash under the breast and keep the area as dry as possible    Continue to test blood sugars daily and when needed and continue with diabetic diet and weight loss efforts.  While her A1c is going up it is still under good control overall.          This document has been electronically signed by Olga Rojas MD on May 17, 2021 11:26 CDT

## 2021-06-02 DIAGNOSIS — R11.0 NAUSEA: ICD-10-CM

## 2021-06-02 DIAGNOSIS — M25.50 MULTIPLE JOINT PAIN: ICD-10-CM

## 2021-06-02 RX ORDER — HYDROCODONE BITARTRATE AND ACETAMINOPHEN 7.5; 325 MG/1; MG/1
1 TABLET ORAL EVERY 6 HOURS PRN
Qty: 120 TABLET | Refills: 0 | Status: SHIPPED | OUTPATIENT
Start: 2021-06-02 | End: 2021-06-28 | Stop reason: SDUPTHER

## 2021-06-02 RX ORDER — PROMETHAZINE HYDROCHLORIDE 25 MG/1
25 TABLET ORAL EVERY 8 HOURS PRN
Qty: 30 TABLET | Refills: 2 | Status: SHIPPED | OUTPATIENT
Start: 2021-06-02 | End: 2021-08-02

## 2021-06-28 DIAGNOSIS — M25.50 MULTIPLE JOINT PAIN: ICD-10-CM

## 2021-06-28 RX ORDER — HYDROCODONE BITARTRATE AND ACETAMINOPHEN 7.5; 325 MG/1; MG/1
1 TABLET ORAL EVERY 6 HOURS PRN
Qty: 120 TABLET | Refills: 0 | Status: SHIPPED | OUTPATIENT
Start: 2021-06-28 | End: 2021-07-29 | Stop reason: SDUPTHER

## 2021-07-29 DIAGNOSIS — M25.50 MULTIPLE JOINT PAIN: ICD-10-CM

## 2021-07-29 RX ORDER — HYDROCODONE BITARTRATE AND ACETAMINOPHEN 7.5; 325 MG/1; MG/1
1 TABLET ORAL EVERY 6 HOURS PRN
Qty: 120 TABLET | Refills: 0 | Status: SHIPPED | OUTPATIENT
Start: 2021-07-29 | End: 2021-08-17

## 2021-07-31 DIAGNOSIS — R11.0 NAUSEA: ICD-10-CM

## 2021-08-02 RX ORDER — PROMETHAZINE HYDROCHLORIDE 25 MG/1
TABLET ORAL
Qty: 30 TABLET | Refills: 2 | Status: SHIPPED | OUTPATIENT
Start: 2021-08-02 | End: 2021-08-13

## 2021-08-12 DIAGNOSIS — R11.0 NAUSEA: ICD-10-CM

## 2021-08-13 RX ORDER — PROMETHAZINE HYDROCHLORIDE 25 MG/1
TABLET ORAL
Qty: 30 TABLET | Refills: 2 | Status: SHIPPED | OUTPATIENT
Start: 2021-08-13 | End: 2021-10-22

## 2021-08-13 NOTE — TELEPHONE ENCOUNTER
Rx Refill Note  Requested Prescriptions     Pending Prescriptions Disp Refills   • promethazine (PHENERGAN) 25 MG tablet [Pharmacy Med Name: promethazine 25 mg tablet] 30 tablet 2     Sig: TAKE 1 TABLET BY MOUTH EVERY 8 HOURS AS NEEDED for nausea or vomiting      Last office visit with prescribing clinician: 5/17/2021      Next office visit with prescribing clinician: 8/17/2021            Fadi Ray LPN  08/13/21, 07:22 CDT

## 2021-08-17 ENCOUNTER — OFFICE VISIT (OUTPATIENT)
Dept: FAMILY MEDICINE CLINIC | Facility: CLINIC | Age: 61
End: 2021-08-17

## 2021-08-17 VITALS — HEIGHT: 60 IN | BODY MASS INDEX: 46.88 KG/M2 | WEIGHT: 238.8 LBS

## 2021-08-17 DIAGNOSIS — Z91.018 ALLERGY TO ALPHA-GAL: ICD-10-CM

## 2021-08-17 DIAGNOSIS — M54.9 MID BACK PAIN: ICD-10-CM

## 2021-08-17 DIAGNOSIS — E11.65 TYPE 2 DIABETES MELLITUS WITH HYPERGLYCEMIA, UNSPECIFIED WHETHER LONG TERM INSULIN USE (HCC): Primary | ICD-10-CM

## 2021-08-17 DIAGNOSIS — L50.0 ALLERGIC URTICARIA: ICD-10-CM

## 2021-08-17 DIAGNOSIS — E66.01 OBESITY, MORBID, BMI 40.0-49.9 (HCC): ICD-10-CM

## 2021-08-17 DIAGNOSIS — M25.50 MULTIPLE JOINT PAIN: ICD-10-CM

## 2021-08-17 PROCEDURE — 99214 OFFICE O/P EST MOD 30 MIN: CPT | Performed by: FAMILY MEDICINE

## 2021-08-17 RX ORDER — HYDROCODONE BITARTRATE AND ACETAMINOPHEN 10; 325 MG/1; MG/1
1 TABLET ORAL EVERY 6 HOURS PRN
Qty: 120 TABLET | Refills: 0 | Status: SHIPPED | OUTPATIENT
Start: 2021-08-17 | End: 2021-09-27 | Stop reason: SDUPTHER

## 2021-08-17 NOTE — PROGRESS NOTES
"Subjective   Prema Curtis is a 61 y.o. female.  Patient here today for follow-up on  Type 2 diabetes, alpha gal, chronic joint pain.  Wondered about increasing her pain medicine slightly.  She is having more pain in the knees hips and other joints.  She is not a good candidate for NSAIDs.  Her last alpha gal titers were low and she has still been trying to follow a diet but I occasionally has tried meat and has not had any reactions but wondered about having it rechecked with her next labs.  Her blood sugars are doing well and she brings a diary with her with testing for the last 6 months.  Levels are usually 140 or less.    She had her left fifth toenail on something and tore a piece of it off and it is sore.  Wondered about care for this.  History of Present Illness   The following portions of the patient's history were reviewed and updated as appropriate: allergies, current medications, past family history, past medical history, past social history, past surgical history and problem list.      Review of Systems   Constitutional: Negative.    HENT: Negative for postnasal drip and sneezing.    Cardiovascular: Negative.    Gastrointestinal: Negative.    Musculoskeletal: Positive for arthralgias.   Allergic/Immunologic: Positive for food allergies. Negative for immunocompromised state.   Neurological: Negative for syncope.   Hematological: Negative.    Psychiatric/Behavioral: Negative for agitation, dysphoric mood and sleep disturbance.   All other systems reviewed and are negative.    Objective    Vitals:    08/17/21 1005   Weight: 108 kg (238 lb 12.8 oz)   Height: 152.4 cm (60\")     Body mass index is 46.64 kg/m².    Physical Exam   Constitutional: She is oriented to person, place, and time. She appears well-developed.   HENT:   Head: Normocephalic and atraumatic.   Nose: Nose normal.   Eyes: Pupils are equal, round, and reactive to light. Conjunctivae are normal.   Neck: No JVD present. No tracheal " deviation present. No thyromegaly present.   Cardiovascular: Normal rate, regular rhythm and normal heart sounds.   No murmur heard.  Pulmonary/Chest: Effort normal and breath sounds normal. She has no wheezes.   Abdominal: Soft. Bowel sounds are normal. She exhibits no distension. There is no abdominal tenderness.   Musculoskeletal: Tenderness present.      Comments: Decreased range of motion in the left shoulder in all directions with pain anteriorly   Lymphadenopathy:     She has no cervical adenopathy.   Neurological: She is alert and oriented to person, place, and time. She displays normal reflexes. Coordination abnormal.   Skin: Skin is warm and dry. No rash noted.   Left fifth toenail shows an avulsion of lateral half of the nail with some redness of the nailbed below   Nursing note and vitals reviewed.    Assessment/Plan   Diagnoses and all orders for this visit:    1. Type 2 diabetes mellitus with hyperglycemia, unspecified whether long term insulin use (CMS/[facility]) (Primary)  -     Comprehensive Metabolic Panel  -     CBC & Differential; Future  -     Hemoglobin A1c  -     LDL Cholesterol, Direct; Future  -     TSH  -     T4, Free    2. Allergy to alpha-gal  -     Alpha - Gal Panel; Future    3. Mid back pain  -     HYDROcodone-acetaminophen (Norco)  MG per tablet; Take 1 tablet by mouth Every 6 (Six) Hours As Needed for Moderate Pain .  Dispense: 120 tablet; Refill: 0    4. Multiple joint pain    5. Obesity, morbid, BMI 40.0-49.9 (CMS/[facility])    6. Allergic urticaria   -     Alpha - Gal Panel; Future    Other orders  -     mupirocin (Bactroban) 2 % ointment; Apply  topically to the appropriate area as directed 3 (Three) Times a Day.  Dispense: 30 g; Refill: 5    Will get labs as above including repeating the alpha gal panel she will continue diet as tolerated.    Bactroban given for the avulsed nail and keep it covered with a bandage, contact me signs for infection.  We will increase the dose of the  hydrocodone to 10 mg for increasing pain issues.    Continue to test blood sugars daily and when needed and continue with diabetic diet and weight loss efforts.  While her A1c is going up it is still under good control overall.    Discussed the patient's BMI with her. BMI is above normal parameters. Follow-up plan includes:  educational material and nutrition counseling.    The patient has read and signed the [facility] Controlled Substance Contract.  I will continue to see patient for regular follow up appointments. Patient is well controlled on the medication.  SHAYLA has been reviewed by me and is updated every 3 months. The patient is aware of the potential for addiction and dependence.             This document has been electronically signed by Olga Rojas MD on August 17, 2021 10:31 CDT

## 2021-08-30 DIAGNOSIS — E11.8 TYPE 2 DIABETES MELLITUS WITH COMPLICATION, WITHOUT LONG-TERM CURRENT USE OF INSULIN (HCC): ICD-10-CM

## 2021-08-30 RX ORDER — ROSUVASTATIN CALCIUM 5 MG/1
TABLET, COATED ORAL
Qty: 30 TABLET | Refills: 5 | Status: SHIPPED | OUTPATIENT
Start: 2021-08-30 | End: 2022-02-16

## 2021-08-30 RX ORDER — PERPHENAZINE 16 MG/1
TABLET, FILM COATED ORAL
Qty: 100 EACH | Refills: 5 | Status: SHIPPED | OUTPATIENT
Start: 2021-08-30 | End: 2021-09-13 | Stop reason: SDUPTHER

## 2021-08-30 NOTE — TELEPHONE ENCOUNTER
Rx Refill Note  Requested Prescriptions     Pending Prescriptions Disp Refills   • rosuvastatin (CRESTOR) 5 MG tablet [Pharmacy Med Name: rosuvastatin 5 mg tablet] 30 tablet 5     Sig: TAKE 1 TABLET BY MOUTH once daily      Last office visit with prescribing clinician: 8/17/2021      Next office visit with prescribing clinician: 11/17/2021            Fadi Ray LPN  08/30/21, 09:31 CDT

## 2021-09-13 DIAGNOSIS — E11.8 TYPE 2 DIABETES MELLITUS WITH COMPLICATION, WITHOUT LONG-TERM CURRENT USE OF INSULIN (HCC): ICD-10-CM

## 2021-09-13 RX ORDER — PERPHENAZINE 16 MG/1
TABLET, FILM COATED ORAL
Qty: 100 EACH | Refills: 5 | Status: SHIPPED | OUTPATIENT
Start: 2021-09-13 | End: 2022-03-03 | Stop reason: SDUPTHER

## 2021-09-27 DIAGNOSIS — M54.9 MID BACK PAIN: ICD-10-CM

## 2021-09-27 RX ORDER — HYDROCODONE BITARTRATE AND ACETAMINOPHEN 10; 325 MG/1; MG/1
1 TABLET ORAL EVERY 6 HOURS PRN
Qty: 120 TABLET | Refills: 0 | Status: SHIPPED | OUTPATIENT
Start: 2021-09-27 | End: 2021-10-25 | Stop reason: SDUPTHER

## 2021-10-11 DIAGNOSIS — Z12.31 ENCOUNTER FOR SCREENING MAMMOGRAM FOR MALIGNANT NEOPLASM OF BREAST: Primary | ICD-10-CM

## 2021-10-22 DIAGNOSIS — R11.0 NAUSEA: ICD-10-CM

## 2021-10-22 RX ORDER — PROMETHAZINE HYDROCHLORIDE 25 MG/1
TABLET ORAL
Qty: 30 TABLET | Refills: 2 | Status: SHIPPED | OUTPATIENT
Start: 2021-10-22 | End: 2021-10-25 | Stop reason: SDUPTHER

## 2021-10-25 DIAGNOSIS — M54.9 MID BACK PAIN: ICD-10-CM

## 2021-10-25 DIAGNOSIS — R11.0 NAUSEA: ICD-10-CM

## 2021-10-25 RX ORDER — PROMETHAZINE HYDROCHLORIDE 25 MG/1
25 TABLET ORAL EVERY 8 HOURS PRN
Qty: 30 TABLET | Refills: 2 | Status: SHIPPED | OUTPATIENT
Start: 2021-10-25 | End: 2021-11-17 | Stop reason: SDUPTHER

## 2021-10-25 RX ORDER — HYDROCODONE BITARTRATE AND ACETAMINOPHEN 10; 325 MG/1; MG/1
1 TABLET ORAL EVERY 6 HOURS PRN
Qty: 120 TABLET | Refills: 0 | Status: SHIPPED | OUTPATIENT
Start: 2021-10-25 | End: 2021-11-17 | Stop reason: SDUPTHER

## 2021-11-08 ENCOUNTER — LAB (OUTPATIENT)
Dept: LAB | Facility: OTHER | Age: 61
End: 2021-11-08

## 2021-11-08 DIAGNOSIS — Z91.018 ALLERGY TO ALPHA-GAL: ICD-10-CM

## 2021-11-08 DIAGNOSIS — E11.65 TYPE 2 DIABETES MELLITUS WITH HYPERGLYCEMIA, UNSPECIFIED WHETHER LONG TERM INSULIN USE (HCC): ICD-10-CM

## 2021-11-08 DIAGNOSIS — L50.0 ALLERGIC URTICARIA: ICD-10-CM

## 2021-11-08 LAB
ALBUMIN SERPL-MCNC: 4.6 G/DL (ref 3.5–5)
ALBUMIN/GLOB SERPL: 1.3 G/DL (ref 1.1–1.8)
ALP SERPL-CCNC: 96 U/L (ref 38–126)
ALT SERPL W P-5'-P-CCNC: 19 U/L
ANION GAP SERPL CALCULATED.3IONS-SCNC: 8 MMOL/L (ref 5–15)
ARTICHOKE IGE QN: 90 MG/DL (ref 0–100)
AST SERPL-CCNC: 20 U/L (ref 14–36)
BASOPHILS # BLD AUTO: 0.04 10*3/MM3 (ref 0–0.2)
BASOPHILS NFR BLD AUTO: 0.6 % (ref 0–1.5)
BILIRUB SERPL-MCNC: 0.8 MG/DL (ref 0.2–1.3)
BUN SERPL-MCNC: 16 MG/DL (ref 7–23)
BUN/CREAT SERPL: 21.6 (ref 7–25)
CALCIUM SPEC-SCNC: 9.6 MG/DL (ref 8.4–10.2)
CHLORIDE SERPL-SCNC: 106 MMOL/L (ref 101–112)
CO2 SERPL-SCNC: 26 MMOL/L (ref 22–30)
CREAT SERPL-MCNC: 0.74 MG/DL (ref 0.52–1.04)
DEPRECATED RDW RBC AUTO: 47.1 FL (ref 37–54)
EOSINOPHIL # BLD AUTO: 0.4 10*3/MM3 (ref 0–0.4)
EOSINOPHIL NFR BLD AUTO: 5.6 % (ref 0.3–6.2)
ERYTHROCYTE [DISTWIDTH] IN BLOOD BY AUTOMATED COUNT: 14.4 % (ref 12.3–15.4)
GFR SERPL CREATININE-BSD FRML MDRD: 80 ML/MIN/1.73 (ref 45–104)
GLOBULIN UR ELPH-MCNC: 3.5 GM/DL (ref 2.3–3.5)
GLUCOSE SERPL-MCNC: 161 MG/DL (ref 70–99)
HBA1C MFR BLD: 7.39 % (ref 4.8–5.6)
HCT VFR BLD AUTO: 44.4 % (ref 34–46.6)
HGB BLD-MCNC: 13.9 G/DL (ref 12–15.9)
LYMPHOCYTES # BLD AUTO: 1.86 10*3/MM3 (ref 0.7–3.1)
LYMPHOCYTES NFR BLD AUTO: 26 % (ref 19.6–45.3)
MCH RBC QN AUTO: 28.4 PG (ref 26.6–33)
MCHC RBC AUTO-ENTMCNC: 31.3 G/DL (ref 31.5–35.7)
MCV RBC AUTO: 90.8 FL (ref 79–97)
MONOCYTES # BLD AUTO: 0.67 10*3/MM3 (ref 0.1–0.9)
MONOCYTES NFR BLD AUTO: 9.4 % (ref 5–12)
NEUTROPHILS NFR BLD AUTO: 4.18 10*3/MM3 (ref 1.7–7)
NEUTROPHILS NFR BLD AUTO: 58.4 % (ref 42.7–76)
PLATELET # BLD AUTO: 266 10*3/MM3 (ref 140–450)
PMV BLD AUTO: 10.8 FL (ref 6–12)
POTASSIUM SERPL-SCNC: 4 MMOL/L (ref 3.4–5)
PROT SERPL-MCNC: 8.1 G/DL (ref 6.3–8.6)
RBC # BLD AUTO: 4.89 10*6/MM3 (ref 3.77–5.28)
SODIUM SERPL-SCNC: 140 MMOL/L (ref 137–145)
T4 FREE SERPL-MCNC: 1.15 NG/DL (ref 0.93–1.7)
TSH SERPL DL<=0.05 MIU/L-ACNC: 3.03 UIU/ML (ref 0.27–4.2)
WBC # BLD AUTO: 7.15 10*3/MM3 (ref 3.4–10.8)

## 2021-11-08 PROCEDURE — 36415 COLL VENOUS BLD VENIPUNCTURE: CPT | Performed by: FAMILY MEDICINE

## 2021-11-08 PROCEDURE — 83036 HEMOGLOBIN GLYCOSYLATED A1C: CPT | Performed by: FAMILY MEDICINE

## 2021-11-08 PROCEDURE — 86008 ALLG SPEC IGE RECOMB EA: CPT | Performed by: FAMILY MEDICINE

## 2021-11-08 PROCEDURE — 80053 COMPREHEN METABOLIC PANEL: CPT | Performed by: FAMILY MEDICINE

## 2021-11-08 PROCEDURE — 86003 ALLG SPEC IGE CRUDE XTRC EA: CPT | Performed by: FAMILY MEDICINE

## 2021-11-08 PROCEDURE — 85025 COMPLETE CBC W/AUTO DIFF WBC: CPT | Performed by: FAMILY MEDICINE

## 2021-11-08 PROCEDURE — 84443 ASSAY THYROID STIM HORMONE: CPT | Performed by: FAMILY MEDICINE

## 2021-11-08 PROCEDURE — 84439 ASSAY OF FREE THYROXINE: CPT | Performed by: FAMILY MEDICINE

## 2021-11-08 PROCEDURE — 83721 ASSAY OF BLOOD LIPOPROTEIN: CPT | Performed by: FAMILY MEDICINE

## 2021-11-12 LAB
ALPHA-GAL IGE QN: 0.13 KU/L
BEEF IGE QN: 0.1 KU/L
DEPRECATED BEEF IGE RAST QL: ABNORMAL
DEPRECATED LAMB IGE RAST QL: 0
DEPRECATED PORK IGE RAST QL: 0
LAMB IGE QN: <0.1 KU/L
PORK IGE QN: <0.1 KU/L

## 2021-11-16 NOTE — PROGRESS NOTES
Notify patient test results are ok, alpha gal down in the negative range.  If she wants to try the meat challenge, if she has not already done so, this would be fine

## 2021-11-17 ENCOUNTER — OFFICE VISIT (OUTPATIENT)
Dept: FAMILY MEDICINE CLINIC | Facility: CLINIC | Age: 61
End: 2021-11-17

## 2021-11-17 VITALS
TEMPERATURE: 97.5 F | DIASTOLIC BLOOD PRESSURE: 82 MMHG | HEART RATE: 86 BPM | BODY MASS INDEX: 46.53 KG/M2 | SYSTOLIC BLOOD PRESSURE: 132 MMHG | HEIGHT: 60 IN | WEIGHT: 237 LBS | OXYGEN SATURATION: 97 %

## 2021-11-17 DIAGNOSIS — E11.8 TYPE 2 DIABETES MELLITUS WITH COMPLICATION, WITHOUT LONG-TERM CURRENT USE OF INSULIN (HCC): Primary | ICD-10-CM

## 2021-11-17 DIAGNOSIS — Z23 NEED FOR VACCINATION: ICD-10-CM

## 2021-11-17 DIAGNOSIS — E11.42 DIABETIC PERIPHERAL NEUROPATHY (HCC): ICD-10-CM

## 2021-11-17 DIAGNOSIS — M54.9 MID BACK PAIN: ICD-10-CM

## 2021-11-17 DIAGNOSIS — R11.0 NAUSEA: ICD-10-CM

## 2021-11-17 PROCEDURE — 90686 IIV4 VACC NO PRSV 0.5 ML IM: CPT | Performed by: FAMILY MEDICINE

## 2021-11-17 PROCEDURE — 99214 OFFICE O/P EST MOD 30 MIN: CPT | Performed by: FAMILY MEDICINE

## 2021-11-17 PROCEDURE — G0008 ADMIN INFLUENZA VIRUS VAC: HCPCS | Performed by: FAMILY MEDICINE

## 2021-11-17 RX ORDER — PROMETHAZINE HYDROCHLORIDE 25 MG/1
25 TABLET ORAL EVERY 8 HOURS PRN
Qty: 30 TABLET | Refills: 2 | Status: SHIPPED | OUTPATIENT
Start: 2021-11-17 | End: 2022-01-11 | Stop reason: SDUPTHER

## 2021-11-17 RX ORDER — HYDROCODONE BITARTRATE AND ACETAMINOPHEN 10; 325 MG/1; MG/1
1 TABLET ORAL EVERY 6 HOURS PRN
Qty: 120 TABLET | Refills: 0 | Status: SHIPPED | OUTPATIENT
Start: 2021-11-17 | End: 2021-12-22 | Stop reason: SDUPTHER

## 2021-11-17 NOTE — PROGRESS NOTES
"Subjective   Prema Curtis is a 61 y.o. female.    Patient with type 2 diabetes, diabetic peripheral neuropathy, and chronic low back pain and knee pain here today for follow-up and refills.  She had alpha gal and would like to have her titers rechecked.  She has been eating some meats.  She has not had any reactions.    She is on hydrocodone which does help with the pain issues.    She is on Jardiance and glimepiride.  Her sugars have gone up a little bit but are averaging 1 40-1 50 for the most part.  This is a little higher than hers usually run and she admits to some dietary noncompliance.    She is having some burning pain in the feet and wonders about some diabetic shoes.    She needs a new glucose meter.    History of Present Illness   The following portions of the patient's history were reviewed and updated as appropriate: allergies, current medications, past family history, past medical history, past social history, past surgical history and problem list.      Review of Systems   Constitutional: Negative.    HENT: Negative for postnasal drip and sneezing.    Cardiovascular: Negative.    Gastrointestinal: Negative.    Musculoskeletal: Positive for arthralgias.   Allergic/Immunologic: Positive for food allergies. Negative for immunocompromised state.   Neurological: Negative for syncope.   Hematological: Negative.    Psychiatric/Behavioral: Negative for agitation, dysphoric mood and sleep disturbance.   All other systems reviewed and are negative.    Objective    Vitals:    11/17/21 1252   BP: 132/82   Pulse: 86   Temp: 97.5 °F (36.4 °C)   TempSrc: Tympanic   SpO2: 97%   Weight: 108 kg (237 lb)   Height: 152.4 cm (60\")     Body mass index is 46.29 kg/m².    Physical Exam   Constitutional: She is oriented to person, place, and time. She appears well-developed.   HENT:   Head: Normocephalic and atraumatic.   Nose: Nose normal.   Eyes: Pupils are equal, round, and reactive to light. Conjunctivae are " normal.   Neck: No JVD present. No tracheal deviation present. No thyromegaly present.   Cardiovascular: Normal rate, regular rhythm and normal heart sounds.   No murmur heard.  Pulmonary/Chest: Effort normal and breath sounds normal. She has no wheezes.   Abdominal: Soft. Bowel sounds are normal. She exhibits no distension. There is no abdominal tenderness.   Musculoskeletal: Tenderness present.      Comments: Decreased range of motion in the left shoulder in all directions with pain anteriorly   Lymphadenopathy:     She has no cervical adenopathy.   Neurological: She is alert and oriented to person, place, and time. She displays normal reflexes. Coordination abnormal.   Skin: Skin is warm and dry. No rash noted.   Left fifth toenail shows an avulsion of lateral half of the nail with some callus formed on the lateral toe.  She has thick cracked calluses on the heels, lack of an arch in the foot.  There is subjective diminished sensation dorsally and over the toes   Nursing note and vitals reviewed.    Assessment/Plan   Diagnoses and all orders for this visit:    1. Type 2 diabetes mellitus with complication, without long-term current use of insulin (HCC) (Primary)    2. Nausea  -     promethazine (PHENERGAN) 25 MG tablet; Take 1 tablet by mouth Every 8 (Eight) Hours As Needed for Nausea or Vomiting.  Dispense: 30 tablet; Refill: 2    3. Mid back pain  -     HYDROcodone-acetaminophen (Norco)  MG per tablet; Take 1 tablet by mouth Every 6 (Six) Hours As Needed for Moderate Pain .  Dispense: 120 tablet; Refill: 0    4. Diabetic peripheral neuropathy (HCC)    5. Need for vaccination  -     FluLaval/Fluarix/Fluzone >6 Months ([id number])    New glucometer is ordered and she should test blood sugars at least daily and when needed.    Continue hydrocodone for the back pain.  She has been compliant. The patient has read and signed the [facility] Controlled Substance Contract.  I will continue to see patient for  regular follow up appointments. Patient is well controlled on the medication.  SHAYLA has been reviewed by me and is updated every 3 months. The patient is aware of the potential for addiction and dependence.     Will get labs as above including repeating the alpha gal panel she will continue diet as tolerated.    Diabetic shoes are ordered, as above    Continue to test blood sugars daily and when needed and continue with diabetic diet and weight loss efforts.  We will recheck another A1c in 3 months.    Discussed the patient's BMI with her. BMI is above normal parameters. Follow-up plan includes:  educational material and nutrition counseling.    The patient has read and signed the [facility] Controlled Substance Contract.  I will continue to see patient for regular follow up appointments. Patient is well controlled on the medication.  SHAYLA has been reviewed by me and is updated every 3 months. The patient is aware of the potential for addiction and dependence.     Flu shot given today        This document has been electronically signed by Olga Rojas MD on November 17, 2021 16:02 CST

## 2021-12-22 DIAGNOSIS — M54.9 MID BACK PAIN: ICD-10-CM

## 2021-12-22 RX ORDER — HYDROCODONE BITARTRATE AND ACETAMINOPHEN 10; 325 MG/1; MG/1
1 TABLET ORAL EVERY 6 HOURS PRN
Qty: 120 TABLET | Refills: 0 | Status: SHIPPED | OUTPATIENT
Start: 2021-12-22 | End: 2022-01-24 | Stop reason: SDUPTHER

## 2022-01-11 DIAGNOSIS — R11.0 NAUSEA: ICD-10-CM

## 2022-01-11 RX ORDER — PROMETHAZINE HYDROCHLORIDE 25 MG/1
25 TABLET ORAL EVERY 8 HOURS PRN
Qty: 30 TABLET | Refills: 2 | Status: SHIPPED | OUTPATIENT
Start: 2022-01-11 | End: 2022-02-16

## 2022-01-24 DIAGNOSIS — M54.9 MID BACK PAIN: ICD-10-CM

## 2022-01-24 RX ORDER — HYDROCODONE BITARTRATE AND ACETAMINOPHEN 10; 325 MG/1; MG/1
1 TABLET ORAL EVERY 6 HOURS PRN
Qty: 120 TABLET | Refills: 0 | Status: SHIPPED | OUTPATIENT
Start: 2022-01-24 | End: 2022-02-17 | Stop reason: SDUPTHER

## 2022-02-07 ENCOUNTER — LAB (OUTPATIENT)
Dept: LAB | Facility: OTHER | Age: 62
End: 2022-02-07

## 2022-02-14 DIAGNOSIS — E03.9 HYPOTHYROIDISM, UNSPECIFIED TYPE: ICD-10-CM

## 2022-02-14 DIAGNOSIS — R11.0 NAUSEA: ICD-10-CM

## 2022-02-14 DIAGNOSIS — E11.9 TYPE 2 DIABETES MELLITUS WITHOUT COMPLICATION, UNSPECIFIED WHETHER LONG TERM INSULIN USE: ICD-10-CM

## 2022-02-14 NOTE — TELEPHONE ENCOUNTER
[facility] refill request, last filled on 02/14/2022.   Rx Refill Note  Requested Prescriptions     Pending Prescriptions Disp Refills   • levothyroxine (SYNTHROID, LEVOTHROID) 25 MCG tablet [Pharmacy Med Name: levothyroxine 25 mcg tablet] 30 tablet 11     Sig: TAKE 1 TABLET BY MOUTH once daily   • Jardiance 25 MG tablet tablet [Pharmacy Med Name: Jardiance 25 mg tablet] 30 tablet 11     Sig: TAKE 1 TABLET BY MOUTH EVERY MORNING before breakfast   • rosuvastatin (CRESTOR) 5 MG tablet [Pharmacy Med Name: rosuvastatin 5 mg tablet] 30 tablet 5     Sig: TAKE 1 TABLET BY MOUTH once daily   • promethazine (PHENERGAN) 25 MG tablet [Pharmacy Med Name: promethazine 25 mg tablet] 30 tablet 2     Sig: TAKE 1 TABLET BY MOUTH EVERY 8 HOURS AS NEEDED FOR NAUSEA / vomiting      Last office visit with prescribing clinician: 11/17/2021      Next office visit with prescribing clinician: 2/17/2022            Fadi Ray, LPN  02/14/22, 10:53 CST

## 2022-02-16 RX ORDER — LEVOTHYROXINE SODIUM 0.03 MG/1
TABLET ORAL
Qty: 30 TABLET | Refills: 11 | Status: SHIPPED | OUTPATIENT
Start: 2022-02-16 | End: 2022-06-09

## 2022-02-16 RX ORDER — PROMETHAZINE HYDROCHLORIDE 25 MG/1
TABLET ORAL
Qty: 30 TABLET | Refills: 2 | Status: SHIPPED | OUTPATIENT
Start: 2022-02-16 | End: 2022-02-17 | Stop reason: SDUPTHER

## 2022-02-16 RX ORDER — ROSUVASTATIN CALCIUM 5 MG/1
TABLET, COATED ORAL
Qty: 30 TABLET | Refills: 5 | Status: SHIPPED | OUTPATIENT
Start: 2022-02-16 | End: 2022-06-09

## 2022-02-16 RX ORDER — EMPAGLIFLOZIN 25 MG/1
TABLET, FILM COATED ORAL
Qty: 30 TABLET | Refills: 11 | Status: SHIPPED | OUTPATIENT
Start: 2022-02-16 | End: 2022-06-09

## 2022-02-17 ENCOUNTER — OFFICE VISIT (OUTPATIENT)
Dept: FAMILY MEDICINE CLINIC | Facility: CLINIC | Age: 62
End: 2022-02-17

## 2022-02-17 VITALS
SYSTOLIC BLOOD PRESSURE: 138 MMHG | HEART RATE: 82 BPM | HEIGHT: 60 IN | WEIGHT: 231 LBS | OXYGEN SATURATION: 95 % | TEMPERATURE: 96.1 F | BODY MASS INDEX: 45.35 KG/M2 | DIASTOLIC BLOOD PRESSURE: 70 MMHG

## 2022-02-17 DIAGNOSIS — Z00.00 MEDICARE ANNUAL WELLNESS VISIT, SUBSEQUENT: Primary | ICD-10-CM

## 2022-02-17 DIAGNOSIS — R11.0 NAUSEA: ICD-10-CM

## 2022-02-17 DIAGNOSIS — E11.9 TYPE 2 DIABETES MELLITUS WITHOUT COMPLICATION, UNSPECIFIED WHETHER LONG TERM INSULIN USE: ICD-10-CM

## 2022-02-17 DIAGNOSIS — M54.9 MID BACK PAIN: ICD-10-CM

## 2022-02-17 PROCEDURE — 1160F RVW MEDS BY RX/DR IN RCRD: CPT | Performed by: FAMILY MEDICINE

## 2022-02-17 PROCEDURE — 1170F FXNL STATUS ASSESSED: CPT | Performed by: FAMILY MEDICINE

## 2022-02-17 PROCEDURE — 1159F MED LIST DOCD IN RCRD: CPT | Performed by: FAMILY MEDICINE

## 2022-02-17 PROCEDURE — G0439 PPPS, SUBSEQ VISIT: HCPCS | Performed by: FAMILY MEDICINE

## 2022-02-17 RX ORDER — PROMETHAZINE HYDROCHLORIDE 25 MG/1
25 TABLET ORAL EVERY 6 HOURS PRN
Qty: 30 TABLET | Refills: 2 | Status: SHIPPED | OUTPATIENT
Start: 2022-02-17 | End: 2022-03-21 | Stop reason: SDUPTHER

## 2022-02-17 RX ORDER — HYDROCODONE BITARTRATE AND ACETAMINOPHEN 10; 325 MG/1; MG/1
1 TABLET ORAL EVERY 6 HOURS PRN
Qty: 120 TABLET | Refills: 0 | Status: SHIPPED | OUTPATIENT
Start: 2022-02-17 | End: 2022-02-22 | Stop reason: SDUPTHER

## 2022-02-17 NOTE — PROGRESS NOTES
The ABCs of the Annual Wellness Visit  Subsequent Medicare Wellness Visit    Chief Complaint   Patient presents with   • Medicare Wellness-subsequent      Subjective    History of Present Illness:  Prema Curtis is a 62 y.o. female who presents for a Subsequent Medicare Wellness Visit.    The following portions of the patient's history were reviewed and   updated as appropriate: allergies, current medications, past family history, past medical history, past social history, past surgical history and problem list.    Compared to one year ago, the patient feels her physical   health is the same.    Compared to one year ago, the patient feels her mental   health is the same.    She does need a refill of hydrocodone which she is on for chronic joint and knee pain issues.    She monitors blood pressure sugars regularly at home and they are usually running in the 130-140 range fasting    Recent Hospitalizations:  She was not admitted to the hospital during the last year.     Current Medical Providers:  Patient Care Team:  Olga Rojas MD as PCP - General    Outpatient Medications Prior to Visit   Medication Sig Dispense Refill   • azelastine (ASTELIN) 0.1 % nasal spray 2 sprays 2 (Two) Times a Day.  11   • Easy Touch Lancets 28G/Twist misc Use as directed at least once daily and prn 100 each 5   • glucose blood (Contour Next Test) test strip Test FSBS TID and prn due to labile blood sugars 100 each 5   • HYDROcodone-acetaminophen (Norco)  MG per tablet Take 1 tablet by mouth Every 6 (Six) Hours As Needed for Moderate Pain . 120 tablet 0   • ibuprofen (ADVIL,MOTRIN) 800 MG tablet Take 1 tablet by mouth 3 (Three) Times a Day As Needed for Mild Pain . 90 tablet 3   • Jardiance 25 MG tablet tablet TAKE 1 TABLET BY MOUTH EVERY MORNING before breakfast 30 tablet 11   • levothyroxine (SYNTHROID, LEVOTHROID) 25 MCG tablet TAKE 1 TABLET BY MOUTH once daily 30 tablet 11   • lisinopril (PRINIVIL,ZESTRIL) 10 MG tablet  Take 1 tablet by mouth Daily. 30 tablet 5   • montelukast (SINGULAIR) 10 MG tablet 1 tablet daily 30 tablet 5   • mupirocin (Bactroban) 2 % ointment Apply  topically to the appropriate area as directed 3 (Three) Times a Day. 30 g 5   • nystatin (MYCOSTATIN) 837017 UNIT/GM powder apply topically to THE appropriate area 4 TIMES DAILY. apply under breast TWICE DAILY 30 g 5   • promethazine (PHENERGAN) 25 MG tablet TAKE 1 TABLET BY MOUTH EVERY 8 HOURS AS NEEDED FOR NAUSEA / vomiting 30 tablet 2   • rosuvastatin (CRESTOR) 5 MG tablet TAKE 1 TABLET BY MOUTH once daily 30 tablet 5   • triamcinolone (KENALOG) 0.1 % cream Apply  topically to the appropriate area as directed 2 (Two) Times a Day. 80 g 5   • VENTOLIN  (90 Base) MCG/ACT inhaler INHALE 2 PUFFS EVERY 6 (SIX) HOURS AS NEEDED FOR WHEEZING OR SHORTNESS OF AIR. 18 g 5     No facility-administered medications prior to visit.       Opioid medication/s are on active medication list.  and I have evaluated her active treatment plan and pain score trends (see table).  Vitals:    02/17/22 1332   PainSc:   9   PainLoc: Generalized     I have reviewed the chart for potential of high risk medication and harmful drug interactions in the elderly.            Aspirin is not on active medication list.  Aspirin use is not indicated based on review of current medical condition/s. Risk of harm outweighs potential benefits.  .    Patient Active Problem List   Diagnosis   • Allergic reaction   • Vitamin B12 deficiency (non anemic)   • Tinea corporis   • Tenderness over spine   • Superficial injury of breast without infection   • Skin rash   • Psoriasis of scalp   • Plantar fasciitis   • Morbid obesity, unspecified obesity type (HCC)   • Myalgia   • Knee pain   • Hypertensive disorder   • Hidradenitis suppurativa   • H/O mammogram   • VETO (generalized anxiety disorder)   • Foot pain   • Fatigue   • Essential hypertension   • Enlarged lymph nodes   • Drug therapy   • Cutaneous  "abscess   • Community acquired pneumonia   • Anxious mood   • Ankle joint pain   • Chronic pain of right knee   • Multiple joint pain   • Presence of right artificial knee joint   • Bilateral low back pain without sciatica   • Type 2 diabetes mellitus with complication, without long-term current use of insulin (HCC)     Advance Care Planning  Advance Directive is not on file.  ACP discussion was held with the patient during this visit. Patient does not have an advance directive, declines further assistance.    Review of Systems   Constitutional: Negative.         Patient has been dieting and has lost weight   HENT: Negative.    Respiratory: Negative.    Cardiovascular: Negative.    Gastrointestinal: Negative.    Musculoskeletal: Negative.    Skin: Negative.    Neurological: Negative.    Psychiatric/Behavioral: Negative.    All other systems reviewed and are negative.       Objective    Vitals:    02/17/22 1332   BP: 138/70   Pulse: 82   Temp: 96.1 °F (35.6 °C)   SpO2: 95%   Weight: 105 kg (231 lb)   Height: 152.4 cm (60\")   PainSc:   9   PainLoc: Generalized     BMI Readings from Last 1 Encounters:   02/17/22 45.11 kg/m²   BMI is above normal parameters. Recommendations include: exercise counseling and nutrition counseling    Does the patient have evidence of cognitive impairment? No    Physical Exam  Vitals and nursing note reviewed.   Constitutional:       Appearance: She is well-developed. She is obese.   HENT:      Head: Normocephalic and atraumatic.      Nose: Nose normal.   Eyes:      Conjunctiva/sclera: Conjunctivae normal.      Pupils: Pupils are equal, round, and reactive to light.   Neck:      Thyroid: No thyromegaly.      Vascular: No JVD.      Trachea: No tracheal deviation.   Cardiovascular:      Rate and Rhythm: Normal rate and regular rhythm.      Heart sounds: Normal heart sounds. No murmur heard.      Pulmonary:      Effort: Pulmonary effort is normal.      Breath sounds: Normal breath sounds. No " wheezing.   Abdominal:      General: Bowel sounds are normal. There is no distension.      Palpations: Abdomen is soft.      Tenderness: There is no abdominal tenderness.   Musculoskeletal:         General: Normal range of motion.      Cervical back: Normal range of motion and neck supple.   Lymphadenopathy:      Cervical: No cervical adenopathy.   Skin:     General: Skin is warm and dry.      Findings: No rash.   Neurological:      Mental Status: She is alert and oriented to person, place, and time.      Coordination: Coordination normal.               HEALTH RISK ASSESSMENT    Smoking Status:  Social History     Tobacco Use   Smoking Status Never Smoker   Smokeless Tobacco Never Used     Alcohol Consumption:  Social History     Substance and Sexual Activity   Alcohol Use No     Fall Risk Screen:    [facility], and patient is at LOW risk for falls.Assessment completed on:2/17/2022    Depression Screening:  PHQ-2/PHQ-9 Depression Screening 2/17/2022   Little interest or pleasure in doing things 0   Feeling down, depressed, or hopeless 0   Trouble falling or staying asleep, or sleeping too much -   Feeling tired or having little energy -   Poor appetite or overeating -   Feeling bad about yourself - or that you are a failure or have let yourself or your family down -   Trouble concentrating on things, such as reading the newspaper or watching television -   Moving or speaking so slowly that other people could have noticed. Or the opposite - being so fidgety or restless that you have been moving around a lot more than usual -   Thoughts that you would be better off dead, or of hurting yourself in some way -   Total Score 0   If you checked off any problems, how difficult have these problems made it for you to do your work, take care of things at home, or get along with other people? -       Health Habits and Functional and Cognitive Screening:  Functional & Cognitive Status 2/17/2022    Do you have difficulty preparing food and eating? No   Do you have difficulty bathing yourself, getting dressed or grooming yourself? No   Do you have difficulty using the toilet? No   Do you have difficulty moving around from place to place? No   Do you have trouble with steps or getting out of a bed or a chair? No   Current Diet Diabetic Diet   Dental Exam Up to date   Eye Exam Up to date   Exercise (times per week) 6 times per week   Current Exercises Include House Cleaning   Do you need help using the phone?  No   Are you deaf or do you have serious difficulty hearing?  No   Do you need help with transportation? No   Do you need help shopping? No   Do you need help preparing meals?  No   Do you need help with housework?  No   Do you need help with laundry? No   Do you need help taking your medications? No   Do you need help managing money? No   Do you ever drive or ride in a car without wearing a seat belt? No   Have you felt unusual stress, anger or loneliness in the last month? No   Who do you live with? Other   If you need help, do you have trouble finding someone available to you? No   Do you have difficulty concentrating, remembering or making decisions? No       Age-appropriate Screening Schedule:  Refer to the list below for future screening recommendations based on patient's age, sex and/or medical conditions. Orders for these recommended tests are listed in the plan section. The patient has been provided with a written plan.    Health Maintenance   Topic Date Due   • URINE MICROALBUMIN  Never done   • DIABETIC EYE EXAM  Never done   • PAP SMEAR  06/22/2020   • DIABETIC FOOT EXAM  10/22/2020   • ZOSTER VACCINE (1 of 2) 09/23/2024 (Originally 2/17/2009)   • HEMOGLOBIN A1C  05/08/2022   • LIPID PANEL  11/08/2022   • MAMMOGRAM  01/11/2024   • TDAP/TD VACCINES (3 - Td or Tdap) 08/22/2028   • INFLUENZA VACCINE  Completed              Assessment/Plan   CMS Preventative Services Quick Reference  Risk Factors  Identified During Encounter  Obesity/Overweight   The above risks/problems have been discussed with the patient.  Follow up actions/plans if indicated are seen below in the Assessment/Plan Section.  Pertinent information has been shared with the patient in the After Visit Summary.    Diagnoses and all orders for this visit:    1. Medicare annual wellness visit, subsequent (Primary)    2. Nausea  -     promethazine (PHENERGAN) 25 MG tablet; Take 1 tablet by mouth Every 6 (Six) Hours As Needed for Nausea or Vomiting.  Dispense: 30 tablet; Refill: 2    3. Mid back pain  -     HYDROcodone-acetaminophen (Norco)  MG per tablet; Take 1 tablet by mouth Every 6 (Six) Hours As Needed for Moderate Pain .  Dispense: 120 tablet; Refill: 0    4. Type 2 diabetes mellitus without complication, unspecified whether long term insulin use (HCC)    Continue with current diabetes medications and testing of blood sugars at least daily and prn.  Encourage compliance with diabetic diet.   The patient has read and signed the [facility] Controlled Substance Contract.  I will continue to see patient for regular follow up appointments. Patient is well controlled on the medication.  SHAYLA has been reviewed by me and is updated every 3 months. The patient is aware of the potential for addiction and dependence.   Continue on hydrocodone for low back pain.  Phenergan is refilled to have for nausea to keep on hand when needed    Follow Up:   No follow-ups on file.     An After Visit Summary and PPPS were made available to the patient.        I spent 30 minutes caring for Prema on this date of service. This time includes time spent by me in the following activities:preparing for the visit, reviewing tests, performing a medically appropriate examination and/or evaluation , counseling and educating the patient/family/caregiver, documenting information in the medical record and independently interpreting results and communicating that  information with the patient/family/caregiver             This document has been electronically signed by Olga Rojas MD on February 17, 2022 15:36 CST

## 2022-02-22 DIAGNOSIS — M54.9 MID BACK PAIN: ICD-10-CM

## 2022-02-22 RX ORDER — HYDROCODONE BITARTRATE AND ACETAMINOPHEN 10; 325 MG/1; MG/1
1 TABLET ORAL EVERY 6 HOURS PRN
Qty: 120 TABLET | Refills: 0 | Status: SHIPPED | OUTPATIENT
Start: 2022-02-22 | End: 2022-03-21 | Stop reason: SDUPTHER

## 2022-02-25 RX ORDER — NYSTATIN 100000 [USP'U]/G
POWDER TOPICAL
Qty: 30 G | Refills: 5 | Status: SHIPPED | OUTPATIENT
Start: 2022-02-25 | End: 2022-06-09

## 2022-03-03 DIAGNOSIS — E11.8 TYPE 2 DIABETES MELLITUS WITH COMPLICATION, WITHOUT LONG-TERM CURRENT USE OF INSULIN: Primary | ICD-10-CM

## 2022-03-03 DIAGNOSIS — I10 ESSENTIAL HYPERTENSION: ICD-10-CM

## 2022-03-03 DIAGNOSIS — E03.9 HYPOTHYROIDISM, UNSPECIFIED TYPE: ICD-10-CM

## 2022-03-03 DIAGNOSIS — E11.8 TYPE 2 DIABETES MELLITUS WITH COMPLICATION, WITHOUT LONG-TERM CURRENT USE OF INSULIN: ICD-10-CM

## 2022-03-03 RX ORDER — PERPHENAZINE 16 MG/1
TABLET, FILM COATED ORAL
Qty: 100 EACH | Refills: 5 | Status: SHIPPED | OUTPATIENT
Start: 2022-03-03

## 2022-03-21 DIAGNOSIS — M54.9 MID BACK PAIN: ICD-10-CM

## 2022-03-21 DIAGNOSIS — R11.0 NAUSEA: ICD-10-CM

## 2022-03-21 RX ORDER — HYDROCODONE BITARTRATE AND ACETAMINOPHEN 10; 325 MG/1; MG/1
1 TABLET ORAL EVERY 6 HOURS PRN
Qty: 120 TABLET | Refills: 0 | Status: SHIPPED | OUTPATIENT
Start: 2022-03-21 | End: 2022-04-19 | Stop reason: SDUPTHER

## 2022-03-21 RX ORDER — PROMETHAZINE HYDROCHLORIDE 25 MG/1
25 TABLET ORAL EVERY 6 HOURS PRN
Qty: 30 TABLET | Refills: 2 | Status: SHIPPED | OUTPATIENT
Start: 2022-03-21 | End: 2022-11-16 | Stop reason: SDUPTHER

## 2022-04-19 DIAGNOSIS — M54.9 MID BACK PAIN: ICD-10-CM

## 2022-04-19 RX ORDER — HYDROCODONE BITARTRATE AND ACETAMINOPHEN 10; 325 MG/1; MG/1
1 TABLET ORAL EVERY 6 HOURS PRN
Qty: 120 TABLET | Refills: 0 | Status: SHIPPED | OUTPATIENT
Start: 2022-04-19 | End: 2022-05-16 | Stop reason: SDUPTHER

## 2022-05-09 ENCOUNTER — LAB (OUTPATIENT)
Dept: LAB | Facility: OTHER | Age: 62
End: 2022-05-09

## 2022-05-09 DIAGNOSIS — I10 ESSENTIAL HYPERTENSION: ICD-10-CM

## 2022-05-09 DIAGNOSIS — E03.9 HYPOTHYROIDISM, UNSPECIFIED TYPE: ICD-10-CM

## 2022-05-09 DIAGNOSIS — E11.8 TYPE 2 DIABETES MELLITUS WITH COMPLICATION, WITHOUT LONG-TERM CURRENT USE OF INSULIN: ICD-10-CM

## 2022-05-09 LAB
ALBUMIN SERPL-MCNC: 4.3 G/DL (ref 3.5–5)
ALBUMIN/GLOB SERPL: 1.4 G/DL (ref 1.1–1.8)
ALP SERPL-CCNC: 91 U/L (ref 38–126)
ALT SERPL W P-5'-P-CCNC: 19 U/L
ANION GAP SERPL CALCULATED.3IONS-SCNC: 7 MMOL/L (ref 5–15)
AST SERPL-CCNC: 22 U/L (ref 14–36)
BASOPHILS # BLD AUTO: 0.04 10*3/MM3 (ref 0–0.2)
BASOPHILS NFR BLD AUTO: 0.6 % (ref 0–1.5)
BILIRUB SERPL-MCNC: 0.6 MG/DL (ref 0.2–1.3)
BUN SERPL-MCNC: 12 MG/DL (ref 7–23)
BUN/CREAT SERPL: 16.7 (ref 7–25)
CALCIUM SPEC-SCNC: 9.1 MG/DL (ref 8.4–10.2)
CHLORIDE SERPL-SCNC: 104 MMOL/L (ref 101–112)
CHOLEST SERPL-MCNC: 151 MG/DL (ref 150–200)
CO2 SERPL-SCNC: 30 MMOL/L (ref 22–30)
CREAT SERPL-MCNC: 0.72 MG/DL (ref 0.52–1.04)
DEPRECATED RDW RBC AUTO: 45.1 FL (ref 37–54)
EGFRCR SERPLBLD CKD-EPI 2021: 94.1 ML/MIN/1.73
EOSINOPHIL # BLD AUTO: 0.36 10*3/MM3 (ref 0–0.4)
EOSINOPHIL NFR BLD AUTO: 5.3 % (ref 0.3–6.2)
ERYTHROCYTE [DISTWIDTH] IN BLOOD BY AUTOMATED COUNT: 14 % (ref 12.3–15.4)
GLOBULIN UR ELPH-MCNC: 3 GM/DL (ref 2.3–3.5)
GLUCOSE SERPL-MCNC: 144 MG/DL (ref 70–99)
HBA1C MFR BLD: 6.8 % (ref 4.8–5.6)
HCT VFR BLD AUTO: 44.1 % (ref 34–46.6)
HDLC SERPL-MCNC: 48 MG/DL (ref 40–59)
HGB BLD-MCNC: 14.4 G/DL (ref 12–15.9)
LDLC SERPL CALC-MCNC: 73 MG/DL
LDLC/HDLC SERPL: 1.41 {RATIO} (ref 0–3.22)
LYMPHOCYTES # BLD AUTO: 1.69 10*3/MM3 (ref 0.7–3.1)
LYMPHOCYTES NFR BLD AUTO: 24.7 % (ref 19.6–45.3)
MCH RBC QN AUTO: 29.4 PG (ref 26.6–33)
MCHC RBC AUTO-ENTMCNC: 32.7 G/DL (ref 31.5–35.7)
MCV RBC AUTO: 90 FL (ref 79–97)
MONOCYTES # BLD AUTO: 0.7 10*3/MM3 (ref 0.1–0.9)
MONOCYTES NFR BLD AUTO: 10.2 % (ref 5–12)
NEUTROPHILS NFR BLD AUTO: 4.05 10*3/MM3 (ref 1.7–7)
NEUTROPHILS NFR BLD AUTO: 59.2 % (ref 42.7–76)
PLATELET # BLD AUTO: 256 10*3/MM3 (ref 140–450)
PMV BLD AUTO: 10.9 FL (ref 6–12)
POTASSIUM SERPL-SCNC: 3.8 MMOL/L (ref 3.4–5)
PROT SERPL-MCNC: 7.3 G/DL (ref 6.3–8.6)
RBC # BLD AUTO: 4.9 10*6/MM3 (ref 3.77–5.28)
SODIUM SERPL-SCNC: 141 MMOL/L (ref 137–145)
TRIGL SERPL-MCNC: 177 MG/DL
VLDLC SERPL-MCNC: 30 MG/DL (ref 5–40)
WBC NRBC COR # BLD: 6.84 10*3/MM3 (ref 3.4–10.8)

## 2022-05-09 PROCEDURE — 83036 HEMOGLOBIN GLYCOSYLATED A1C: CPT | Performed by: FAMILY MEDICINE

## 2022-05-09 PROCEDURE — 85025 COMPLETE CBC W/AUTO DIFF WBC: CPT | Performed by: FAMILY MEDICINE

## 2022-05-09 PROCEDURE — 36415 COLL VENOUS BLD VENIPUNCTURE: CPT | Performed by: FAMILY MEDICINE

## 2022-05-09 PROCEDURE — 84439 ASSAY OF FREE THYROXINE: CPT | Performed by: FAMILY MEDICINE

## 2022-05-09 PROCEDURE — 84443 ASSAY THYROID STIM HORMONE: CPT | Performed by: FAMILY MEDICINE

## 2022-05-09 PROCEDURE — 80061 LIPID PANEL: CPT | Performed by: FAMILY MEDICINE

## 2022-05-09 PROCEDURE — 80053 COMPREHEN METABOLIC PANEL: CPT | Performed by: FAMILY MEDICINE

## 2022-05-10 LAB
T4 FREE SERPL-MCNC: 1.25 NG/DL (ref 0.93–1.7)
TSH SERPL DL<=0.05 MIU/L-ACNC: 2.75 UIU/ML (ref 0.27–4.2)

## 2022-05-16 ENCOUNTER — OFFICE VISIT (OUTPATIENT)
Dept: FAMILY MEDICINE CLINIC | Facility: CLINIC | Age: 62
End: 2022-05-16

## 2022-05-16 VITALS
OXYGEN SATURATION: 98 % | HEIGHT: 60 IN | BODY MASS INDEX: 44.25 KG/M2 | SYSTOLIC BLOOD PRESSURE: 144 MMHG | DIASTOLIC BLOOD PRESSURE: 76 MMHG | WEIGHT: 225.4 LBS | TEMPERATURE: 97.8 F | HEART RATE: 68 BPM

## 2022-05-16 DIAGNOSIS — M54.9 MID BACK PAIN: Primary | ICD-10-CM

## 2022-05-16 DIAGNOSIS — E11.42 DIABETIC PERIPHERAL NEUROPATHY: ICD-10-CM

## 2022-05-16 DIAGNOSIS — E11.8 TYPE 2 DIABETES MELLITUS WITH COMPLICATION, WITHOUT LONG-TERM CURRENT USE OF INSULIN: ICD-10-CM

## 2022-05-16 DIAGNOSIS — M25.521 RIGHT ELBOW PAIN: ICD-10-CM

## 2022-05-16 DIAGNOSIS — E66.01 MORBID OBESITY, UNSPECIFIED OBESITY TYPE: ICD-10-CM

## 2022-05-16 PROCEDURE — 99214 OFFICE O/P EST MOD 30 MIN: CPT | Performed by: FAMILY MEDICINE

## 2022-05-16 RX ORDER — HYDROCODONE BITARTRATE AND ACETAMINOPHEN 10; 325 MG/1; MG/1
1 TABLET ORAL EVERY 6 HOURS PRN
Qty: 120 TABLET | Refills: 0 | Status: SHIPPED | OUTPATIENT
Start: 2022-05-16 | End: 2022-06-15 | Stop reason: SDUPTHER

## 2022-05-16 NOTE — PROGRESS NOTES
"Subjective   Prema Curtis is a 62 y.o. female.  Patient with type 2 diabetes with peripheral neuropathy, chronic low back and knee pain.  Had recent labs to review.  She is here today for follow-up, refills of medications.  Her blood sugars have been doing well.  She test them at least every day and they are running usually around 1 20-1 30 range.  There is an occasional outlier in the 140s but for the most part she continues to have excellent control.  She has lost 6 more pounds through diet efforts and has been trying to be more active    She does have hydrocodone for the pain issues as she is not a good candidate for NSAIDs.  She takes hydrocodone very sparingly.  She lives with her brother who helps oversee her medications.    She has some pain in the left foot fifth toe.  She is due for a diabetic foot exam and podiatry follow-up.    After the exam today the patient got up to leave and fell in the hallway.  She is not sure what happened but seemed to trip over her shoes    History of Present Illness   The following portions of the patient's history were reviewed and updated as appropriate: allergies, current medications, past family history, past medical history, past social history, past surgical history and problem list.      Review of Systems   Constitutional: Negative.    HENT: Negative for postnasal drip and sneezing.    Cardiovascular: Negative.    Gastrointestinal: Negative.    Musculoskeletal: Positive for arthralgias.   Allergic/Immunologic: Positive for food allergies. Negative for immunocompromised state.   Neurological: Negative for syncope.   Hematological: Negative.    Psychiatric/Behavioral: Negative for agitation, dysphoric mood and sleep disturbance.   All other systems reviewed and are negative.    Objective    Vitals:    05/16/22 1253   BP: 144/76   Pulse: 68   Temp: 97.8 °F (36.6 °C)   TempSrc: Tympanic   SpO2: 98%   Weight: 102 kg (225 lb 6.4 oz)   Height: 152.4 cm (60\")     Body " mass index is 44.02 kg/m².    Physical Exam   Constitutional: She is oriented to person, place, and time. She appears well-developed.   HENT:   Head: Normocephalic and atraumatic.   Nose: Nose normal.   Eyes: Pupils are equal, round, and reactive to light. Conjunctivae are normal.   Neck: No JVD present. No tracheal deviation present. No thyromegaly present.   Cardiovascular: Normal rate, regular rhythm and normal heart sounds.   No murmur heard.  Pulmonary/Chest: Effort normal and breath sounds normal. She has no wheezes.   Abdominal: Soft. Bowel sounds are normal. She exhibits no distension. There is no abdominal tenderness.   Musculoskeletal: Tenderness present.      Comments: Tender over the right elbow   Lymphadenopathy:     She has no cervical adenopathy.   Neurological: She is alert and oriented to person, place, and time. She displays normal reflexes. Coordination abnormal.   Skin: Skin is warm and dry. No rash noted.   Left fifth toenail shows an avulsion of lateral half of the nail, callus on the lateral left toe.  Overall the foot is slightly deformed with the fifth toe rolled inward   Nursing note and vitals reviewed.    Lab on 05/09/2022   Component Date Value Ref Range Status   • Free T4 05/09/2022 1.25  0.93 - 1.70 ng/dL Final   • TSH 05/09/2022 2.750  0.270 - 4.200 uIU/mL Final   • Hemoglobin A1C 05/09/2022 6.80 (A) 4.80 - 5.60 % Final   • Glucose 05/09/2022 144 (A) 70 - 99 mg/dL Final   • BUN 05/09/2022 12  7 - 23 mg/dL Final   • Creatinine 05/09/2022 0.72  0.52 - 1.04 mg/dL Final   • Sodium 05/09/2022 141  137 - 145 mmol/L Final   • Potassium 05/09/2022 3.8  3.4 - 5.0 mmol/L Final   • Chloride 05/09/2022 104  101 - 112 mmol/L Final   • CO2 05/09/2022 30.0  22.0 - 30.0 mmol/L Final   • Calcium 05/09/2022 9.1  8.4 - 10.2 mg/dL Final   • Total Protein 05/09/2022 7.3  6.3 - 8.6 g/dL Final   • Albumin 05/09/2022 4.30  3.50 - 5.00 g/dL Final   • ALT (SGPT) 05/09/2022 19  <=35 U/L Final   • AST (SGOT)  05/09/2022 22  14 - 36 U/L Final   • Alkaline Phosphatase 05/09/2022 91  38 - 126 U/L Final   • Total Bilirubin 05/09/2022 0.6  0.2 - 1.3 mg/dL Final   • Globulin 05/09/2022 3.0  2.3 - 3.5 gm/dL Final   • A/G Ratio 05/09/2022 1.4  1.1 - 1.8 g/dL Final   • BUN/Creatinine Ratio 05/09/2022 16.7  7.0 - 25.0 Final   • Anion Gap 05/09/2022 7.0  5.0 - 15.0 mmol/L Final   • eGFR 05/09/2022 94.1  >60.0 mL/min/1.73 Final    National Kidney Foundation and American Society of Nephrology (ASN) Task Force recommended calculation based on the Chronic Kidney Disease Epidemiology Collaboration (CKD-EPI) equation refit without adjustment for race.   • Total Cholesterol 05/09/2022 151  150 - 200 mg/dL Final   • Triglycerides 05/09/2022 177 (A) <=150 mg/dL Final   • HDL Cholesterol 05/09/2022 48  40 - 59 mg/dL Final   • LDL Cholesterol  05/09/2022 73  <=100 mg/dL Final   • VLDL Cholesterol 05/09/2022 30  5 - 40 mg/dL Final   • LDL/HDL Ratio 05/09/2022 1.41  0.00 - 3.22 Final   • WBC 05/09/2022 6.84  3.40 - 10.80 10*3/mm3 Final   • RBC 05/09/2022 4.90  3.77 - 5.28 10*6/mm3 Final   • Hemoglobin 05/09/2022 14.4  12.0 - 15.9 g/dL Final   • Hematocrit 05/09/2022 44.1  34.0 - 46.6 % Final   • MCV 05/09/2022 90.0  79.0 - 97.0 fL Final   • MCH 05/09/2022 29.4  26.6 - 33.0 pg Final   • MCHC 05/09/2022 32.7  31.5 - 35.7 g/dL Final   • RDW 05/09/2022 14.0  12.3 - 15.4 % Final   • RDW-SD 05/09/2022 45.1  37.0 - 54.0 fl Final   • MPV 05/09/2022 10.9  6.0 - 12.0 fL Final   • Platelets 05/09/2022 256  140 - 450 10*3/mm3 Final   • Neutrophil % 05/09/2022 59.2  42.7 - 76.0 % Final   • Lymphocyte % 05/09/2022 24.7  19.6 - 45.3 % Final   • Monocyte % 05/09/2022 10.2  5.0 - 12.0 % Final   • Eosinophil % 05/09/2022 5.3  0.3 - 6.2 % Final   • Basophil % 05/09/2022 0.6  0.0 - 1.5 % Final   • Neutrophils, Absolute 05/09/2022 4.05  1.70 - 7.00 10*3/mm3 Final   • Lymphocytes, Absolute 05/09/2022 1.69  0.70 - 3.10 10*3/mm3 Final   • Monocytes, Absolute  05/09/2022 0.70  0.10 - 0.90 10*3/mm3 Final   • Eosinophils, Absolute 05/09/2022 0.36  0.00 - 0.40 10*3/mm3 Final   • Basophils, Absolute 05/09/2022 0.04  0.00 - 0.20 10*3/mm3 Final      Radiology Images    Study Result    Narrative & Impression   Procedure:  Right  elbow     Indication:  Patient fell        Technique:  3    views     Prior relevant exam:  None     Slightly displaced fracture radial head and neck.     Right elbow is otherwise unremarkable.     IMPRESSION:  Fracture proximal radius as described above.     Electronically signed by:  Gonzalo Harrison MD  5/16/2022 2:36 PM CDT  Workstation: [id number]     Assessment & Plan   Diagnoses and all orders for this visit:    1. Mid back pain (Primary)  -     HYDROcodone-acetaminophen (Norco)  MG per tablet; Take 1 tablet by mouth Every 6 (Six) Hours As Needed for Moderate Pain .  Dispense: 120 tablet; Refill: 0    2. Type 2 diabetes mellitus with complication, without long-term current use of insulin (HCC)  -     Ambulatory Referral to Podiatry    3. Morbid obesity, unspecified obesity type (HCC)    4. Diabetic peripheral neuropathy (HCC)  -     Ambulatory Referral to Podiatry    5. Right elbow pain  -     XR Elbow 2 View Right    She can continue the hydrocodone for the pain issues including mid back pain and now the elbow fracture.    Referral is made for orthopedics, as above.  She is placed in a sling today.    Referral sent to podiatry.  Advised patient against wearing the shoes like the crocs she is wearing today as I feel this can contribute to her falling and she is already unsteady.  She does have some diabetic shoes and have advised her that she should wear these at all times when she is ambulating.    Discussed the patient's BMI with her. BMI is above normal parameters. Follow-up plan includes:  educational material and nutrition counseling.    Continue hydrocodone for the back pain.  She has been compliant. The patient has read and signed the  [facility] Controlled Substance Contract.  I will continue to see patient for regular follow up appointments. Patient is well controlled on the medication.  SHAYLA has been reviewed by me and is updated every 3 months. The patient is aware of the potential for addiction and dependence.     Continue with current diabetes medications and testing of blood sugars at least daily and prn.  Encourage compliance with diabetic diet.     Reviewed labs with her today, as above.    The patient has read and signed the [facility] Controlled Substance Contract.  I will continue to see patient for regular follow up appointments. Patient is well controlled on the medication.  SHAYLA has been reviewed by me and is updated every 3 months. The patient is aware of the potential for addiction and dependence.           This document has been electronically signed by Olga Rojas MD on May 16, 2022 13:22 CDT

## 2022-05-19 ENCOUNTER — OFFICE VISIT (OUTPATIENT)
Dept: ORTHOPEDIC SURGERY | Facility: CLINIC | Age: 62
End: 2022-05-19

## 2022-05-19 VITALS — HEIGHT: 60 IN | WEIGHT: 225 LBS | BODY MASS INDEX: 44.17 KG/M2

## 2022-05-19 DIAGNOSIS — E66.01 MORBID OBESITY WITH BMI OF 40.0-44.9, ADULT: ICD-10-CM

## 2022-05-19 DIAGNOSIS — M25.521 RIGHT ELBOW PAIN: ICD-10-CM

## 2022-05-19 DIAGNOSIS — S52.101A: Primary | ICD-10-CM
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
## 2022-05-19 PROCEDURE — 24650 CLTX RDL HEAD/NCK FX WO MNPJ: CPT | Performed by: ORTHOPAEDIC SURGERY

## 2022-05-19 PROCEDURE — 99203 OFFICE O/P NEW LOW 30 MIN: CPT | Performed by: ORTHOPAEDIC SURGERY

## 2022-05-19 NOTE — PROGRESS NOTES
Prema Curtis is a 62 y.o. female   Primary provider:  Olga Rojas MD       Chief Complaint   Patient presents with   • Right Elbow - Pain       HISTORY OF PRESENT ILLNESS: Patient is here today for right elbow pain. She had xrays prior to visit.   Patient states that she was leaving another physician's office and bilaterally when she fell landing on her right side.  She had immediate pain in her right elbow.  X-rays reveal that she had a radial head neck fracture.  Patient denies any numbness or tingling.  Pain is slightly improved.  She has pain with any attempted use of her right arm.  She denies any syncopal episode preceding her fall and denies any loss of consciousness associated with her fall.    Pain  This is a new problem. The current episode started in the past 7 days. The problem occurs constantly. The problem has been gradually improving. Associated symptoms include arthralgias. The symptoms are aggravated by exertion. She has tried heat, ice and immobilization for the symptoms. The treatment provided significant relief.        CONCURRENT MEDICAL HISTORY:    Past Medical History:   Diagnosis Date   • Ankle joint pain    • Anxious mood    • Cellulitis and abscess     L Axilla   • Chronic pain of right knee 11/1/2016   • Community acquired pneumonia    • Cutaneous abscess     of limb   • Drug therapy     long-term   • Enlarged lymph nodes     L Axilla   • Essential hypertension    • Fatigue    • Foot pain    • VETO (generalized anxiety disorder)    • H/O mammogram 02/12/2015   • Hidradenitis suppurativa    • Hypertensive disorder    • Knee pain    • Myalgia    • Obesity    • Plantar fasciitis    • Psoriasis of scalp    • Skin rash    • Superficial injury of breast without infection    • Tenderness over spine     lumbar region   • Tinea corporis    • Vitamin B12 deficiency (non anemic)        Allergies   Allergen Reactions   • Apple    • Beef-Derived Products      *Beef   • Milk-Related Compounds       *milk containing products   • Onion    • Penicillins    • Pork-Derived Products      Pork meat   • Ragwitek [Short Ragweed Pollen Ext]      *Ragweed   • Shellfish-Derived Products    • Strawberry Extract    • Sulfa Antibiotics    • Tomato    • Latex Rash         Current Outpatient Medications:   •  azelastine (ASTELIN) 0.1 % nasal spray, 2 sprays 2 (Two) Times a Day., Disp: , Rfl: 11  •  Easy Touch Lancets 28G/Twist misc, Use as directed at least once daily and prn, Disp: 100 each, Rfl: 5  •  glucose blood (Contour Next Test) test strip, Test FSBS TID and prn due to labile blood sugars, Disp: 100 each, Rfl: 5  •  HYDROcodone-acetaminophen (Norco)  MG per tablet, Take 1 tablet by mouth Every 6 (Six) Hours As Needed for Moderate Pain ., Disp: 120 tablet, Rfl: 0  •  ibuprofen (ADVIL,MOTRIN) 800 MG tablet, Take 1 tablet by mouth 3 (Three) Times a Day As Needed for Mild Pain ., Disp: 90 tablet, Rfl: 3  •  Jardiance 25 MG tablet tablet, TAKE 1 TABLET BY MOUTH EVERY MORNING before breakfast, Disp: 30 tablet, Rfl: 11  •  levothyroxine (SYNTHROID, LEVOTHROID) 25 MCG tablet, TAKE 1 TABLET BY MOUTH once daily, Disp: 30 tablet, Rfl: 11  •  lisinopril (PRINIVIL,ZESTRIL) 10 MG tablet, Take 1 tablet by mouth Daily., Disp: 30 tablet, Rfl: 5  •  montelukast (SINGULAIR) 10 MG tablet, 1 tablet daily, Disp: 30 tablet, Rfl: 5  •  mupirocin (Bactroban) 2 % ointment, Apply  topically to the appropriate area as directed 3 (Three) Times a Day., Disp: 30 g, Rfl: 5  •  nystatin 979710 UNIT/GM powder, apply topically to THE appropriate area 4 TIMES DAILY. apply under breast TWICE DAILY, Disp: 30 g, Rfl: 5  •  promethazine (PHENERGAN) 25 MG tablet, Take 1 tablet by mouth Every 6 (Six) Hours As Needed for Nausea or Vomiting., Disp: 30 tablet, Rfl: 2  •  rosuvastatin (CRESTOR) 5 MG tablet, TAKE 1 TABLET BY MOUTH once daily, Disp: 30 tablet, Rfl: 5  •  triamcinolone (KENALOG) 0.1 % cream, Apply  topically to the appropriate area as  "directed 2 (Two) Times a Day., Disp: 80 g, Rfl: 5  •  VENTOLIN  (90 Base) MCG/ACT inhaler, INHALE 2 PUFFS EVERY 6 (SIX) HOURS AS NEEDED FOR WHEEZING OR SHORTNESS OF AIR., Disp: 18 g, Rfl: 5    Past Surgical History:   Procedure Laterality Date   • BREAST BIOPSY Left     Negative   • INJECTION OF MEDICATION  07/07/2014    B12   • TOTAL KNEE ARTHROPLASTY Right 11/11/2013       Family History   Problem Relation Age of Onset   • Diabetes Other         Social History     Socioeconomic History   • Marital status: Single   Tobacco Use   • Smoking status: Never Smoker   • Smokeless tobacco: Never Used   Substance and Sexual Activity   • Alcohol use: No   • Drug use: No   • Sexual activity: Defer        Review of Systems   Constitutional: Negative.    HENT: Negative.    Eyes: Negative.    Respiratory: Negative.    Cardiovascular: Negative.    Endocrine: Negative.    Genitourinary: Negative.    Musculoskeletal: Positive for arthralgias.   Skin: Negative.    Allergic/Immunologic: Negative.    Neurological: Negative.    Hematological: Bruises/bleeds easily.   Psychiatric/Behavioral: Negative.        PHYSICAL EXAMINATION:       Ht 152.4 cm (60\")   Wt 102 kg (225 lb)   BMI 43.94 kg/m²     Physical Exam  Constitutional:       General: She is not in acute distress.     Appearance: Normal appearance.   Pulmonary:      Effort: Pulmonary effort is normal. No respiratory distress.   Neurological:      Mental Status: She is alert and oriented to person, place, and time.         GAIT:     []  Normal  []  Antalgic    Assistive device: []  None  []  Walker     []  Crutches  []  Cane     []  Wheelchair  []  Stretcher    Right Elbow Exam     Comments:  Very tender on the lateral aspect of the elbow.  Very tender with pronation supination of the forearm.  She does tolerate nearly full extension and nearly full flexion.  Good capillary refill.  Good distal sensation.  Strength, stability are deferred due to known " fracture.              XR Elbow 2 View Right    Result Date: 5/16/2022  Narrative: Procedure:  Right  elbow Indication:  Patient fell   Technique:  3    views Prior relevant exam:  None Slightly displaced fracture radial head and neck. Right elbow is otherwise unremarkable.     Impression: Fracture proximal radius as described above. Electronically signed by:  Gonzalo Harrison MD  5/16/2022 2:36 PM CDT Workstation: [id number]          ASSESSMENT:    Diagnoses and all orders for this visit:    Closed fracture of proximal end of right radius, initial encounter    Right elbow pain    Morbid obesity with BMI of 40.0-44.9, adult (HCC)    Fall from standing, initial encounter          PLAN    She has a mildly displaced mildly angulated fracture of the radial neck.  No surgical indication at this time.  We discussed closed management using a sling.  Gentle flexion and extension exercises and gentle pronation supination exercises.  No lifting, pulling, tugging.    Repeat x-rays in approximately 10 days to reassess positioning of the fracture.  Assuming that no changes exist, I would anticipate continued with slow progression of motion.  Can start physical therapy about 3 or 4 weeks after the injury but no force or resistance and no strength until 6 weeks postop.  Repeat x-rays in another 3 or 4 weeks after the next x-ray.  Continue use of the sling for 3 to 4 weeks especially when around people or out of the house.    Return in about 1 week (around 5/26/2022) for Recheck with repeat xrays.    Cisco Davenport MD

## 2022-06-03 DIAGNOSIS — S52.101A: Primary | ICD-10-CM

## 2022-06-06 ENCOUNTER — OFFICE VISIT (OUTPATIENT)
Dept: PODIATRY | Facility: CLINIC | Age: 62
End: 2022-06-06

## 2022-06-06 VITALS — OXYGEN SATURATION: 94 % | BODY MASS INDEX: 44.17 KG/M2 | HEART RATE: 99 BPM | HEIGHT: 60 IN | WEIGHT: 225 LBS

## 2022-06-06 DIAGNOSIS — S91.209A TRAUMATIC AVULSION OF NAIL PLATE OF TOE, INITIAL ENCOUNTER: ICD-10-CM

## 2022-06-06 DIAGNOSIS — M20.42 HAMMER TOES OF BOTH FEET: ICD-10-CM

## 2022-06-06 DIAGNOSIS — M20.41 HAMMER TOES OF BOTH FEET: ICD-10-CM

## 2022-06-06 DIAGNOSIS — E11.9 ENCOUNTER FOR DIABETIC FOOT EXAM: Primary | ICD-10-CM

## 2022-06-06 DIAGNOSIS — E11.42 DIABETIC POLYNEUROPATHY ASSOCIATED WITH TYPE 2 DIABETES MELLITUS: ICD-10-CM

## 2022-06-06 PROCEDURE — 99203 OFFICE O/P NEW LOW 30 MIN: CPT | Performed by: PODIATRIST

## 2022-06-06 NOTE — PROGRESS NOTES
Prema Curtis  1960  62 y.o. female   PCP: Olga Rojas 5/16/2022  BS: 130 per patient     Patient came to clinic for diabetic foot exam.     06/06/2022  Chief Complaint   Patient presents with   • Left Foot - Follow-up     Diabetic foot exam    • Right Foot - Follow-up     Diabetic foot exam              History of Present Illness    Prema Curtis is a 62 y.o. female who presents for diabetic foot exam, evaluation of painful left fifth toenail.  She states a few weeks ago she caught this in the molding of her bathroom.  There has been sharp pain since that time.  She has been treating with topical bacitracin.  Denies any other history of diabetic foot ulcerations or infection.      Past Medical History:   Diagnosis Date   • Ankle joint pain    • Anxious mood    • Cellulitis and abscess     L Axilla   • Chronic pain of right knee 11/1/2016   • Community acquired pneumonia    • Cutaneous abscess     of limb   • Drug therapy     long-term   • Enlarged lymph nodes     L Axilla   • Essential hypertension    • Fatigue    • Foot pain    • VETO (generalized anxiety disorder)    • H/O mammogram 02/12/2015   • Hidradenitis suppurativa    • Hypertensive disorder    • Knee pain    • Myalgia    • Obesity    • Plantar fasciitis    • Psoriasis of scalp    • Skin rash    • Superficial injury of breast without infection    • Tenderness over spine     lumbar region   • Tinea corporis    • Vitamin B12 deficiency (non anemic)          Past Surgical History:   Procedure Laterality Date   • BREAST BIOPSY Left     Negative   • INJECTION OF MEDICATION  07/07/2014    B12   • TOTAL KNEE ARTHROPLASTY Right 11/11/2013         Family History   Problem Relation Age of Onset   • Diabetes Other          Social History     Socioeconomic History   • Marital status: Single   Tobacco Use   • Smoking status: Never Smoker   • Smokeless tobacco: Never Used   Vaping Use   • Vaping Use: Never used   Substance and Sexual Activity   •  "Alcohol use: No   • Drug use: No   • Sexual activity: Defer         Current Outpatient Medications   Medication Sig Dispense Refill   • azelastine (ASTELIN) 0.1 % nasal spray 2 sprays 2 (Two) Times a Day.  11   • Easy Touch Lancets 28G/Twist misc Use as directed at least once daily and prn 100 each 5   • glucose blood (Contour Next Test) test strip Test FSBS TID and prn due to labile blood sugars 100 each 5   • HYDROcodone-acetaminophen (Norco)  MG per tablet Take 1 tablet by mouth Every 6 (Six) Hours As Needed for Moderate Pain . 120 tablet 0   • ibuprofen (ADVIL,MOTRIN) 800 MG tablet Take 1 tablet by mouth 3 (Three) Times a Day As Needed for Mild Pain . 90 tablet 3   • Jardiance 25 MG tablet tablet TAKE 1 TABLET BY MOUTH EVERY MORNING before breakfast 30 tablet 11   • levothyroxine (SYNTHROID, LEVOTHROID) 25 MCG tablet TAKE 1 TABLET BY MOUTH once daily 30 tablet 11   • lisinopril (PRINIVIL,ZESTRIL) 10 MG tablet Take 1 tablet by mouth Daily. 30 tablet 5   • montelukast (SINGULAIR) 10 MG tablet 1 tablet daily 30 tablet 5   • mupirocin (Bactroban) 2 % ointment Apply  topically to the appropriate area as directed 3 (Three) Times a Day. 30 g 5   • nystatin [id number] UNIT/GM powder apply topically to THE appropriate area 4 TIMES DAILY. apply under breast TWICE DAILY 30 g 5   • promethazine (PHENERGAN) 25 MG tablet Take 1 tablet by mouth Every 6 (Six) Hours As Needed for Nausea or Vomiting. 30 tablet 2   • rosuvastatin (CRESTOR) 5 MG tablet TAKE 1 TABLET BY MOUTH once daily 30 tablet 5   • triamcinolone (KENALOG) 0.1 % cream Apply  topically to the appropriate area as directed 2 (Two) Times a Day. 80 g 5   • VENTOLIN  (90 Base) MCG/ACT inhaler INHALE 2 PUFFS EVERY 6 (SIX) HOURS AS NEEDED FOR WHEEZING OR SHORTNESS OF AIR. 18 g 5     No current facility-administered medications for this visit.         OBJECTIVE    Pulse 99   Ht 152.4 cm (60\")   Wt 102 kg (225 lb)   SpO2 94%   BMI 43.94 kg/m²       Review of " Systems   Constitutional: Negative.    HENT: Negative.    Eyes: Negative.    Respiratory: Negative.    Cardiovascular: Negative.    Gastrointestinal: Negative.    Endocrine: Negative.    Genitourinary: Negative.    Musculoskeletal:        Foot PAIN    Skin: Negative.    Allergic/Immunologic: Negative.    Neurological: Negative.    Hematological: Negative.    Psychiatric/Behavioral: Negative.          Diabetic Foot Exam Performed and Monofilament Test Performed       Constitutional: she appears well-developed and well-nourished.   HEENT: Normocephalic. Atraumatic  CV: No tenderness. RRR  Resp: Non-labored respiration. No wheezes.   Psychiatric: she has a normal mood and affect. her   behavior is normal.      Lower Extremity Exam:  Vascular: DP/PT pulses palpable 2+.   Negative hair growth.   1+ perimalleolar edema  Neuro: Protective sensation diminished to plantar foot bilateral, b/l.  Light touch sensation diminished, b/l  DTRs intact  Integument: No open wounds or lesions.  Skin quality normal  Nails 1-5 b/l normal limits of thickness and length.  Left fifth digit fractured vertically down the middle of the nail with loosening of the lateral aspect.  Sharp tenderness palpation.  No signs of infection  No masses  Webspaces c/d/i  Musculoskeletal: LE muscle strength 4/5  Gait Normal  Flexible hammertoe deformity toes 2 through 5 bilateral.  Ankle ROM within normal limits, b/l              ASSESSMENT AND PLAN    Diagnoses and all orders for this visit:    1. Encounter for diabetic foot exam (HCC) (Primary)    2. Diabetic polyneuropathy associated with type 2 diabetes mellitus (HCC)    3. Hammer toes of both feet    4. Traumatic avulsion of nail plate of toe, initial encounter      -Comprehensive DM foot exam performed. Patient educated on importance of tight glucose control and daily foot checks.   -Patient educated on padding techniques for hammertoes.  Proper extra depth diabetic shoe gear.  Limit barefoot  walking.  -Debridement of loosening left fifth digit nail spicule.  Relief of symptoms noted.  -Follow up 3 months PRN          This document has been electronically signed by Nathan Mesa DPM on June 6, 2022 14:30 CDT       Much of this encounter note is an electronic transcription/translation of spoken language to printed text.   Nathan Mesa DPM  6/6/2022  14:30 CDT

## 2022-06-08 ENCOUNTER — OFFICE VISIT (OUTPATIENT)
Dept: ORTHOPEDIC SURGERY | Facility: CLINIC | Age: 62
End: 2022-06-08

## 2022-06-08 ENCOUNTER — PATIENT ROUNDING (BHMG ONLY) (OUTPATIENT)
Dept: PODIATRY | Facility: CLINIC | Age: 62
End: 2022-06-08

## 2022-06-08 VITALS — WEIGHT: 225 LBS | HEIGHT: 60 IN | BODY MASS INDEX: 44.17 KG/M2

## 2022-06-08 DIAGNOSIS — S52.101D CLOSED FRACTURE OF PROXIMAL END OF RIGHT RADIUS WITH ROUTINE HEALING, UNSPECIFIED FRACTURE MORPHOLOGY, SUBSEQUENT ENCOUNTER: Primary | ICD-10-CM
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
## 2022-06-08 DIAGNOSIS — E11.8 TYPE 2 DIABETES MELLITUS WITH COMPLICATION, WITHOUT LONG-TERM CURRENT USE OF INSULIN: ICD-10-CM

## 2022-06-08 DIAGNOSIS — I10 ESSENTIAL HYPERTENSION: ICD-10-CM

## 2022-06-08 DIAGNOSIS — S52.021A CLOSED FRACTURE OF OLECRANON PROCESS OF RIGHT ULNA, INITIAL ENCOUNTER: ICD-10-CM

## 2022-06-08 DIAGNOSIS — M25.521 RIGHT ELBOW PAIN: ICD-10-CM

## 2022-06-08 PROCEDURE — 29105 APPLICATION LONG ARM SPLINT: CPT | Performed by: NURSE PRACTITIONER

## 2022-06-08 PROCEDURE — 99024 POSTOP FOLLOW-UP VISIT: CPT | Performed by: NURSE PRACTITIONER

## 2022-06-08 NOTE — PROGRESS NOTES
The patient is a 62 y.o. female who presents for followup.    Chief Complaint   Patient presents with   • Right Elbow - Follow-up, Fracture     HPI: Patient presents to office accompanied by her niece for follow-up of right elbow injury.  Initial injury occurred on 5/16/2022 when she was leaving her doctor's office and she sustained a mechanical fall from standing height while walking.  X-rays performed after the fall showed evidence of a right radial head fracture.  Patient established care and was evaluated per Dr. Davenport on 5/19/2022 and was placed in a sling at that time for immobilization.  Patient is not wearing the sling in office today and states she has not been wearing it recently as the strap was bothering her neck.  She states that she has been doing range of motion exercises and the pain and swelling in her right elbow have been gradually improving.  She also reports that the bruising has been gradually fading and improving.  Overall, patient is doing well and reports minimal pain.  She already takes Norco 10 mg 4 times daily as needed for control of her chronic pain as prescribed by her PCP.  She has no new complaints or concerns since last office visit.  She denies any falls or any additional injuries to her right elbow since last office visit.  X-rays of the right elbow repeated in office today.    CONCURRENT MEDICAL HISTORY:          Past Medical History:   Diagnosis Date   • Ankle joint pain     • Anxious mood     • Cellulitis and abscess       L Axilla   • Chronic pain of right knee 11/1/2016   • Community acquired pneumonia     • Cutaneous abscess       of limb   • Drug therapy       long-term   • Enlarged lymph nodes       L Axilla   • Essential hypertension     • Fatigue     • Foot pain     • VETO (generalized anxiety disorder)     • H/O mammogram 02/12/2015   • Hidradenitis suppurativa     • Hypertensive disorder     • Knee pain     • Myalgia     • Obesity     • Plantar fasciitis     • Psoriasis  of scalp     • Skin rash     • Superficial injury of breast without infection     • Tenderness over spine       lumbar region   • Tinea corporis     • Vitamin B12 deficiency (non anemic)           Current Outpatient Medications:   •  azelastine (ASTELIN) 0.1 % nasal spray, 2 sprays 2 (Two) Times a Day., Disp: , Rfl: 11  •  Easy Touch Lancets 28G/Twist misc, Use as directed at least once daily and prn, Disp: 100 each, Rfl: 5  •  glucose blood (Contour Next Test) test strip, Test FSBS TID and prn due to labile blood sugars, Disp: 100 each, Rfl: 5  •  HYDROcodone-acetaminophen (Norco)  MG per tablet, Take 1 tablet by mouth Every 6 (Six) Hours As Needed for Moderate Pain ., Disp: 120 tablet, Rfl: 0  •  ibuprofen (ADVIL,MOTRIN) 800 MG tablet, Take 1 tablet by mouth 3 (Three) Times a Day As Needed for Mild Pain ., Disp: 90 tablet, Rfl: 3  •  lisinopril (PRINIVIL,ZESTRIL) 10 MG tablet, Take 1 tablet by mouth Daily., Disp: 30 tablet, Rfl: 5  •  mupirocin (Bactroban) 2 % ointment, Apply  topically to the appropriate area as directed 3 (Three) Times a Day., Disp: 30 g, Rfl: 5  •  promethazine (PHENERGAN) 25 MG tablet, Take 1 tablet by mouth Every 6 (Six) Hours As Needed for Nausea or Vomiting., Disp: 30 tablet, Rfl: 2  •  triamcinolone (KENALOG) 0.1 % cream, Apply  topically to the appropriate area as directed 2 (Two) Times a Day., Disp: 80 g, Rfl: 5  •  albuterol sulfate  (90 Base) MCG/ACT inhaler, Inhale 2 puffs Every 4 (Four) Hours As Needed for Wheezing., Disp: , Rfl:   •  empagliflozin (JARDIANCE) 25 MG tablet tablet, Take 25 mg by mouth Daily., Disp: , Rfl:   •  levothyroxine (SYNTHROID, LEVOTHROID) 25 MCG tablet, Take 25 mcg by mouth Daily., Disp: , Rfl:   •  montelukast (SINGULAIR) 10 MG tablet, Take 10 mg by mouth Every Night., Disp: , Rfl:   •  nystatin (MYCOSTATIN) 171396 UNIT/GM powder, Apply 1 application topically to the appropriate area as directed 4 (Four) Times a Day., Disp: , Rfl:   •   "rosuvastatin (CRESTOR) 5 MG tablet, Take 5 mg by mouth Daily., Disp: , Rfl:     Allergies   Allergen Reactions   • Apple Other (See Comments)     Alpha gal   • Beef-Derived Products Other (See Comments)     Alpha gal   • Milk-Related Compounds Other (See Comments)     Alpha gal   • Onion Other (See Comments)     Alpha gal   • Penicillins Other (See Comments)     \"dont know\"   • Pork-Derived Products Other (See Comments)     Alpha gal   • Ragwitek [Short Ragweed Pollen Ext] Other (See Comments)     Nasal congestion   • Shellfish-Derived Products Other (See Comments)     Alpha gal   • Strawberry Extract Other (See Comments)     Alpha gal   • Sulfa Antibiotics Other (See Comments)     \"dont know\"   • Tomato Other (See Comments)     Alpha gal   • Latex Rash     Past Surgical History:   Procedure Laterality Date   • BREAST BIOPSY Left       Negative   • INJECTION OF MEDICATION   07/07/2014     B12   • TOTAL KNEE ARTHROPLASTY Right 11/11/2013            Family History   Problem Relation Age of Onset   • Diabetes Other        Social History           Socioeconomic History   • Marital status: Single   Tobacco Use   • Smoking status: Never Smoker   • Smokeless tobacco: Never Used   Substance and Sexual Activity   • Alcohol use: No   • Drug use: No   • Sexual activity: Defer        ROS:  No fevers or chills.  No nausea or vomiting. Right elbow pain.  All other systems are reviewed and noted to be negative.    PHYSICAL EXAM:    Vitals:    06/08/22 1038   Weight: 102 kg (225 lb)   Height: 152.4 cm (60\")       GAIT:     []  Normal  [x]  Antalgic (mild)    Assistive device:   [x]  None    []  Walker     []  Crutches    []  Cane     []  Wheelchair    []  Stretcher    Physical Exam  Vitals reviewed.   Constitutional:       General: She is not in acute distress.     Appearance: She is well-developed. She is obese. She is not ill-appearing.   HENT:      Head: Normocephalic.   Pulmonary:      Effort: Pulmonary effort is normal. No " respiratory distress.   Abdominal:      General: There is no distension.      Palpations: Abdomen is soft.   Musculoskeletal:         General: Swelling (Mild, right elbow), tenderness (Mild, right elbow) and signs of injury (Right elbow) present. No deformity.   Skin:     General: Skin is warm and dry.      Capillary Refill: Capillary refill takes less than 2 seconds.      Findings: No erythema.   Neurological:      Mental Status: She is alert and oriented to person, place, and time.      GCS: GCS eye subscore is 4. GCS verbal subscore is 5. GCS motor subscore is 6.      Sensory: No sensory deficit.   Psychiatric:         Speech: Speech normal.         Behavior: Behavior normal.         Thought Content: Thought content normal.         Judgment: Judgment normal.         Right Elbow Exam     Tenderness   The patient is experiencing tenderness in the radial head and olecranon fossa (Mild, diffuse).     Range of Motion   Right elbow extension: 5.   Flexion: 120   Pronation: abnormal   Supination: abnormal     Muscle Strength   Right elbow normal strength: Deferred.    Other   Erythema: absent  Sensation: normal  Pulse: present    Comments:  Mild pain and mild limitations with range of motion.  Mild, generalized swelling but overall swelling is well controlled.  Healing ecchymosis is present, most notably at the volar wrist/forearm.  Full range of motion and strength assessments are deferred due to known radial head and olecranon fractures.  No deformity noted.  Neurovascularly intact.          XR Elbow 2 View Right    Result Date: 5/16/2022  Narrative: Procedure:  Right  elbow Indication:  Patient fell   Technique:  3    views Prior relevant exam:  None Slightly displaced fracture radial head and neck. Right elbow is otherwise unremarkable.     Impression: Fracture proximal radius as described above. Electronically signed by:  Gonzalo Harrison MD  5/16/2022 2:36 PM CDT Workstation: [id number]    XR Elbow 3+ View  Right    Result Date: 6/9/2022  Narrative: Three view right elbow HISTORY: Pain. Fracture follow-up. AP, lateral and oblique views obtained. COMPARISON: May 16, 2022 FINDINGS: Healing mildly displaced transverse fracture radial neck. Minimally displaced and diastatic fracture olecranon process. Small spur coronoid process proximal ulna. No dislocation. No other osseous or articular abnormality.     Impression: CONCLUSION: Healing mildly displaced transverse fracture radial neck. Minimally displaced and diastatic fracture olecranon process. Small spur coronoid process proximal ulna. [id number] Electronically signed by:  Noam Hall MD  6/9/2022 4:42 PM CDT Workstation: [id number]    Hemoglobin A1c  Order: [id number]  Component   Ref Range & Units 1 mo ago   (5/9/22) 7 mo ago   (11/8/21) 1 yr ago   (5/10/21) 1 yr ago   (11/9/20) 2 yr ago   (5/21/20) 2 yr ago   (1/13/20) 2 yr ago   (7/10/19)   Hemoglobin A1C   4.80 - 5.60 % 6.80 High   7.39 High   6.73 High   6.40 High   6.22 High   6.10 High   7.80 High     Resulting Agency  [facility]  [facility]        ASSESSMENT:  Diagnoses and all orders for this visit:    Closed fracture of proximal end of right radius with routine healing, unspecified fracture morphology, subsequent encounter    Right elbow pain    Injury due to fall, subsequent encounter    Closed fracture of olecranon process of right ulna, initial encounter    Type 2 diabetes mellitus with complication, without long-term current use of insulin ([facility])    Essential hypertension    PLAN: X-rays of the right elbow performed in office today and reviewed.  There is redemonstration of the minimally displaced radial head/neck fracture with periosteal reaction noted, indicative of early bony healing.  However, there is a new finding today with a mildly displaced olecranon process fracture that was not visualized on prior x-rays.  There is fairly notable diastasis at the  olecranon process fracture and I reviewed the images today with the patient and her niece and we discussed that surgical fixation of this may be warranted.  However, we discussed that I need to have Dr. Davenport review the x-rays with further recommendations, whether that be conservative or surgical.  The patient and niece verbalized understanding and the niece that is present requests that she be called regarding the recommendations and plan of care.  We discussed that someone from our office will call her later today after I have had a chance to consult with Dr. Davenport.  For now, I would recommend immobilization with a long-arm fiberglass splint.  This is placed in office today.  The patient is also instructed that she can return to using her sling (which is not with her today).  Splint care instructions are provided and the patient is instructed to keep the splint in place until further directives are given.  We discussed and I have recommended to continue with frequent range of motion of the right fingers to prevent stiffness.  The patient states that her pain is minimal and well controlled at this time.  She already takes Norco 10 mg 4 times daily as needed for control of her chronic pain as prescribed by her primary care physician.    After consulting with Dr. Davenport following the office visit and after his review of the x-rays, surgical repair is recommended.  Dr. Davenport is going to call the patient's niece and relay this information and further treatment recommendations and plan for surgery.  The patient has comorbid medical conditions including diabetes mellitus-type II, peripheral neuropathy, hypertension and obesity.  Currently, her medical conditions are well controlled.  She had a recent hemoglobin A1c performed on 5/9/2022 noted to be 6.8.    Contact information for the patient's niece Zoraida #[phone]    [employer] Disclaimer: Some of this note may be an electronic  transcription/translation of spoken language to printed text using the Dragon Dictation System.     Return for follow up postoperatively.        This document has been electronically signed by LM Jesus on June 9, 2022 18:56 CDT      LM Jesus

## 2022-06-08 NOTE — PROGRESS NOTES
June 8, 2022    Hello, may I speak with Prema Curtis?    My name is SULTANA AVALOS     I am  with [facility]  [address]   [place] [address] [phone].    Before we get started may I verify your date of birth? 1960    I am calling to officially welcome you to our practice and ask about your recent visit. Is this a good time to talk? no    GENTLEMAN ANSWERED AND SHE IS AT ANOTHER DOCTOR APPOINTMENT AND IS NOT AT HOME, TOLD HIM I JUST WANTED TO WELCOME HER TO PRACTICE AND MAKE SURE FIRST VISIT WENT OKAY.

## 2022-06-08 NOTE — H&P (VIEW-ONLY)
The patient is a 62 y.o. female who presents for followup.    Chief Complaint   Patient presents with   • Right Elbow - Follow-up, Fracture     HPI: Patient presents to office accompanied by her niece for follow-up of right elbow injury.  Initial injury occurred on 5/16/2022 when she was leaving her doctor's office and she sustained a mechanical fall from standing height while walking.  X-rays performed after the fall showed evidence of a right radial head fracture.  Patient established care and was evaluated per Dr. Davenport on 5/19/2022 and was placed in a sling at that time for immobilization.  Patient is not wearing the sling in office today and states she has not been wearing it recently as the strap was bothering her neck.  She states that she has been doing range of motion exercises and the pain and swelling in her right elbow have been gradually improving.  She also reports that the bruising has been gradually fading and improving.  Overall, patient is doing well and reports minimal pain.  She already takes Norco 10 mg 4 times daily as needed for control of her chronic pain as prescribed by her PCP.  She has no new complaints or concerns since last office visit.  She denies any falls or any additional injuries to her right elbow since last office visit.  X-rays of the right elbow repeated in office today.    CONCURRENT MEDICAL HISTORY:          Past Medical History:   Diagnosis Date   • Ankle joint pain     • Anxious mood     • Cellulitis and abscess       L Axilla   • Chronic pain of right knee 11/1/2016   • Community acquired pneumonia     • Cutaneous abscess       of limb   • Drug therapy       long-term   • Enlarged lymph nodes       L Axilla   • Essential hypertension     • Fatigue     • Foot pain     • VETO (generalized anxiety disorder)     • H/O mammogram 02/12/2015   • Hidradenitis suppurativa     • Hypertensive disorder     • Knee pain     • Myalgia     • Obesity     • Plantar fasciitis     • Psoriasis  of scalp     • Skin rash     • Superficial injury of breast without infection     • Tenderness over spine       lumbar region   • Tinea corporis     • Vitamin B12 deficiency (non anemic)           Current Outpatient Medications:   •  azelastine (ASTELIN) 0.1 % nasal spray, 2 sprays 2 (Two) Times a Day., Disp: , Rfl: 11  •  Easy Touch Lancets 28G/Twist misc, Use as directed at least once daily and prn, Disp: 100 each, Rfl: 5  •  glucose blood (Contour Next Test) test strip, Test FSBS TID and prn due to labile blood sugars, Disp: 100 each, Rfl: 5  •  HYDROcodone-acetaminophen (Norco)  MG per tablet, Take 1 tablet by mouth Every 6 (Six) Hours As Needed for Moderate Pain ., Disp: 120 tablet, Rfl: 0  •  ibuprofen (ADVIL,MOTRIN) 800 MG tablet, Take 1 tablet by mouth 3 (Three) Times a Day As Needed for Mild Pain ., Disp: 90 tablet, Rfl: 3  •  lisinopril (PRINIVIL,ZESTRIL) 10 MG tablet, Take 1 tablet by mouth Daily., Disp: 30 tablet, Rfl: 5  •  mupirocin (Bactroban) 2 % ointment, Apply  topically to the appropriate area as directed 3 (Three) Times a Day., Disp: 30 g, Rfl: 5  •  promethazine (PHENERGAN) 25 MG tablet, Take 1 tablet by mouth Every 6 (Six) Hours As Needed for Nausea or Vomiting., Disp: 30 tablet, Rfl: 2  •  triamcinolone (KENALOG) 0.1 % cream, Apply  topically to the appropriate area as directed 2 (Two) Times a Day., Disp: 80 g, Rfl: 5  •  albuterol sulfate  (90 Base) MCG/ACT inhaler, Inhale 2 puffs Every 4 (Four) Hours As Needed for Wheezing., Disp: , Rfl:   •  empagliflozin (JARDIANCE) 25 MG tablet tablet, Take 25 mg by mouth Daily., Disp: , Rfl:   •  levothyroxine (SYNTHROID, LEVOTHROID) 25 MCG tablet, Take 25 mcg by mouth Daily., Disp: , Rfl:   •  montelukast (SINGULAIR) 10 MG tablet, Take 10 mg by mouth Every Night., Disp: , Rfl:   •  nystatin (MYCOSTATIN) [id number] UNIT/GM powder, Apply 1 application topically to the appropriate area as directed 4 (Four) Times a Day., Disp: , Rfl:   •   "rosuvastatin (CRESTOR) 5 MG tablet, Take 5 mg by mouth Daily., Disp: , Rfl:     Allergies   Allergen Reactions   • Apple Other (See Comments)     Alpha gal   • Beef-Derived Products Other (See Comments)     Alpha gal   • Milk-Related Compounds Other (See Comments)     Alpha gal   • Onion Other (See Comments)     Alpha gal   • Penicillins Other (See Comments)     \"dont know\"   • Pork-Derived Products Other (See Comments)     Alpha gal   • Ragwitek [Short Ragweed Pollen Ext] Other (See Comments)     Nasal congestion   • Shellfish-Derived Products Other (See Comments)     Alpha gal   • Strawberry Extract Other (See Comments)     Alpha gal   • Sulfa Antibiotics Other (See Comments)     \"dont know\"   • Tomato Other (See Comments)     Alpha gal   • Latex Rash     Past Surgical History:   Procedure Laterality Date   • BREAST BIOPSY Left       Negative   • INJECTION OF MEDICATION   07/07/2014     B12   • TOTAL KNEE ARTHROPLASTY Right 11/11/2013            Family History   Problem Relation Age of Onset   • Diabetes Other        Social History           Socioeconomic History   • Marital status: Single   Tobacco Use   • Smoking status: Never Smoker   • Smokeless tobacco: Never Used   Substance and Sexual Activity   • Alcohol use: No   • Drug use: No   • Sexual activity: Defer        ROS:  No fevers or chills.  No nausea or vomiting. Right elbow pain.  All other systems are reviewed and noted to be negative.    PHYSICAL EXAM:    Vitals:    06/08/22 1038   Weight: 102 kg (225 lb)   Height: 152.4 cm (60\")       GAIT:     []  Normal  [x]  Antalgic (mild)    Assistive device:   [x]  None    []  Walker     []  Crutches    []  Cane     []  Wheelchair    []  Stretcher    Physical Exam  Vitals reviewed.   Constitutional:       General: She is not in acute distress.     Appearance: She is well-developed. She is obese. She is not ill-appearing.   HENT:      Head: Normocephalic.   Pulmonary:      Effort: Pulmonary effort is normal. No " respiratory distress.   Abdominal:      General: There is no distension.      Palpations: Abdomen is soft.   Musculoskeletal:         General: Swelling (Mild, right elbow), tenderness (Mild, right elbow) and signs of injury (Right elbow) present. No deformity.   Skin:     General: Skin is warm and dry.      Capillary Refill: Capillary refill takes less than 2 seconds.      Findings: No erythema.   Neurological:      Mental Status: She is alert and oriented to person, place, and time.      GCS: GCS eye subscore is 4. GCS verbal subscore is 5. GCS motor subscore is 6.      Sensory: No sensory deficit.   Psychiatric:         Speech: Speech normal.         Behavior: Behavior normal.         Thought Content: Thought content normal.         Judgment: Judgment normal.         Right Elbow Exam     Tenderness   The patient is experiencing tenderness in the radial head and olecranon fossa (Mild, diffuse).     Range of Motion   Right elbow extension: 5.   Flexion: 120   Pronation: abnormal   Supination: abnormal     Muscle Strength   Right elbow normal strength: Deferred.    Other   Erythema: absent  Sensation: normal  Pulse: present    Comments:  Mild pain and mild limitations with range of motion.  Mild, generalized swelling but overall swelling is well controlled.  Healing ecchymosis is present, most notably at the volar wrist/forearm.  Full range of motion and strength assessments are deferred due to known radial head and olecranon fractures.  No deformity noted.  Neurovascularly intact.          XR Elbow 2 View Right    Result Date: 5/16/2022  Narrative: Procedure:  Right  elbow Indication:  Patient fell   Technique:  3    views Prior relevant exam:  None Slightly displaced fracture radial head and neck. Right elbow is otherwise unremarkable.     Impression: Fracture proximal radius as described above. Electronically signed by:  Gonzalo Harrison MD  5/16/2022 2:36 PM CDT Workstation: [id number]    XR Elbow 3+ View  Right    Result Date: 6/9/2022  Narrative: Three view right elbow HISTORY: Pain. Fracture follow-up. AP, lateral and oblique views obtained. COMPARISON: May 16, 2022 FINDINGS: Healing mildly displaced transverse fracture radial neck. Minimally displaced and diastatic fracture olecranon process. Small spur coronoid process proximal ulna. No dislocation. No other osseous or articular abnormality.     Impression: CONCLUSION: Healing mildly displaced transverse fracture radial neck. Minimally displaced and diastatic fracture olecranon process. Small spur coronoid process proximal ulna. [id number] Electronically signed by:  Noam Hall MD  6/9/2022 4:42 PM CDT Workstation: [id number]    Hemoglobin A1c  Order: [id number]  Component   Ref Range & Units 1 mo ago   (5/9/22) 7 mo ago   (11/8/21) 1 yr ago   (5/10/21) 1 yr ago   (11/9/20) 2 yr ago   (5/21/20) 2 yr ago   (1/13/20) 2 yr ago   (7/10/19)   Hemoglobin A1C   4.80 - 5.60 % 6.80 High   7.39 High   6.73 High   6.40 High   6.22 High   6.10 High   7.80 High     Resulting Agency  [facility]  [facility]        ASSESSMENT:  Diagnoses and all orders for this visit:    Closed fracture of proximal end of right radius with routine healing, unspecified fracture morphology, subsequent encounter    Right elbow pain    Injury due to fall, subsequent encounter    Closed fracture of olecranon process of right ulna, initial encounter    Type 2 diabetes mellitus with complication, without long-term current use of insulin ([facility])    Essential hypertension    PLAN: X-rays of the right elbow performed in office today and reviewed.  There is redemonstration of the minimally displaced radial head/neck fracture with periosteal reaction noted, indicative of early bony healing.  However, there is a new finding today with a mildly displaced olecranon process fracture that was not visualized on prior x-rays.  There is fairly notable diastasis at the  olecranon process fracture and I reviewed the images today with the patient and her niece and we discussed that surgical fixation of this may be warranted.  However, we discussed that I need to have Dr. Davenport review the x-rays with further recommendations, whether that be conservative or surgical.  The patient and niece verbalized understanding and the niece that is present requests that she be called regarding the recommendations and plan of care.  We discussed that someone from our office will call her later today after I have had a chance to consult with Dr. Davenport.  For now, I would recommend immobilization with a long-arm fiberglass splint.  This is placed in office today.  The patient is also instructed that she can return to using her sling (which is not with her today).  Splint care instructions are provided and the patient is instructed to keep the splint in place until further directives are given.  We discussed and I have recommended to continue with frequent range of motion of the right fingers to prevent stiffness.  The patient states that her pain is minimal and well controlled at this time.  She already takes Norco 10 mg 4 times daily as needed for control of her chronic pain as prescribed by her primary care physician.    After consulting with Dr. Davenport following the office visit and after his review of the x-rays, surgical repair is recommended.  Dr. Davenport is going to call the patient's niece and relay this information and further treatment recommendations and plan for surgery.  The patient has comorbid medical conditions including diabetes mellitus-type II, peripheral neuropathy, hypertension and obesity.  Currently, her medical conditions are well controlled.  She had a recent hemoglobin A1c performed on 5/9/2022 noted to be 6.8.    Contact information for the patient's niece Zoraida #[phone]    [employer] Disclaimer: Some of this note may be an electronic  transcription/translation of spoken language to printed text using the Dragon Dictation System.     Return for follow up postoperatively.        This document has been electronically signed by LM Jesus on June 9, 2022 18:56 CDT      LM Jesus

## 2022-06-09 ENCOUNTER — PREP FOR SURGERY (OUTPATIENT)
Dept: OTHER | Facility: HOSPITAL | Age: 62
End: 2022-06-09

## 2022-06-09 ENCOUNTER — TELEPHONE (OUTPATIENT)
Dept: ORTHOPEDIC SURGERY | Facility: CLINIC | Age: 62
End: 2022-06-09

## 2022-06-09 ENCOUNTER — ANESTHESIA EVENT (OUTPATIENT)
Dept: PERIOP | Facility: HOSPITAL | Age: 62
End: 2022-06-09

## 2022-06-09 DIAGNOSIS — E11.42 DIABETIC PERIPHERAL NEUROPATHY: ICD-10-CM

## 2022-06-09 DIAGNOSIS — E66.01 MORBID OBESITY WITH BMI OF 40.0-44.9, ADULT: ICD-10-CM

## 2022-06-09 DIAGNOSIS — E11.8 TYPE 2 DIABETES MELLITUS WITH COMPLICATION, WITHOUT LONG-TERM CURRENT USE OF INSULIN: ICD-10-CM

## 2022-06-09 DIAGNOSIS — S52.101D CLOSED FRACTURE OF PROXIMAL END OF RIGHT RADIUS WITH ROUTINE HEALING, UNSPECIFIED FRACTURE MORPHOLOGY, SUBSEQUENT ENCOUNTER: ICD-10-CM

## 2022-06-09 DIAGNOSIS — S52.021D CLOSED FRACTURE OF OLECRANON PROCESS OF RIGHT ULNA WITH ROUTINE HEALING, SUBSEQUENT ENCOUNTER: Primary | ICD-10-CM

## 2022-06-09 DIAGNOSIS — I10 ESSENTIAL HYPERTENSION: ICD-10-CM

## 2022-06-09 PROBLEM — S52.021A CLOSED FRACTURE OF RIGHT OLECRANON PROCESS: Status: ACTIVE | Noted: 2022-06-09

## 2022-06-09 RX ORDER — ROSUVASTATIN CALCIUM 5 MG/1
5 TABLET, COATED ORAL DAILY
COMMUNITY
End: 2022-09-21

## 2022-06-09 RX ORDER — ALBUTEROL SULFATE 90 UG/1
2 AEROSOL, METERED RESPIRATORY (INHALATION) EVERY 4 HOURS PRN
COMMUNITY

## 2022-06-09 RX ORDER — LEVOTHYROXINE SODIUM 0.03 MG/1
25 TABLET ORAL DAILY
COMMUNITY
End: 2022-11-16 | Stop reason: SDUPTHER

## 2022-06-09 RX ORDER — NYSTATIN 100000 [USP'U]/G
1 POWDER TOPICAL 4 TIMES DAILY
COMMUNITY

## 2022-06-09 RX ORDER — MONTELUKAST SODIUM 10 MG/1
10 TABLET ORAL NIGHTLY
COMMUNITY

## 2022-06-09 RX ORDER — BUPIVACAINE HCL/0.9 % NACL/PF 0.1 %
2 PLASTIC BAG, INJECTION (ML) EPIDURAL ONCE
Status: CANCELLED | OUTPATIENT
Start: 2022-06-10 | End: 2022-06-09

## 2022-06-09 NOTE — PAT
Attempted to obtained pre op info from pt who requested that I call her niece.  Pre op info obtained from Zoraida Fleming (niece).  Niece states pt has some developmental delays and lives with her bother who is her guardian.  States she and guardian will be with pt day of surgery.  I instructed her to have him bring legal papers with him, understanding verbalized.

## 2022-06-09 NOTE — TELEPHONE ENCOUNTER
[facility]    Patients niece was returning a call from last night    saulo zamudio   [phone]

## 2022-06-10 ENCOUNTER — APPOINTMENT (OUTPATIENT)
Dept: GENERAL RADIOLOGY | Facility: HOSPITAL | Age: 62
End: 2022-06-10

## 2022-06-10 ENCOUNTER — ANESTHESIA (OUTPATIENT)
Dept: PERIOP | Facility: HOSPITAL | Age: 62
End: 2022-06-10

## 2022-06-10 ENCOUNTER — HOSPITAL ENCOUNTER (OUTPATIENT)
Facility: HOSPITAL | Age: 62
Discharge: HOME | End: 2022-06-11
Attending: ORTHOPAEDIC SURGERY | Admitting: ORTHOPAEDIC SURGERY

## 2022-06-10 DIAGNOSIS — Z74.09 IMPAIRED FUNCTIONAL MOBILITY, BALANCE, GAIT, AND ENDURANCE: ICD-10-CM

## 2022-06-10 DIAGNOSIS — S52.101D CLOSED FRACTURE OF PROXIMAL END OF RIGHT RADIUS WITH ROUTINE HEALING, UNSPECIFIED FRACTURE MORPHOLOGY, SUBSEQUENT ENCOUNTER: ICD-10-CM

## 2022-06-10 DIAGNOSIS — Z78.9 IMPAIRED MOBILITY AND ACTIVITIES OF DAILY LIVING: ICD-10-CM

## 2022-06-10 DIAGNOSIS — E11.42 DIABETIC PERIPHERAL NEUROPATHY: ICD-10-CM

## 2022-06-10 DIAGNOSIS — Z74.09 IMPAIRED MOBILITY AND ACTIVITIES OF DAILY LIVING: ICD-10-CM

## 2022-06-10 DIAGNOSIS — I10 ESSENTIAL HYPERTENSION: ICD-10-CM

## 2022-06-10 DIAGNOSIS — E11.8 TYPE 2 DIABETES MELLITUS WITH COMPLICATION, WITHOUT LONG-TERM CURRENT USE OF INSULIN: ICD-10-CM

## 2022-06-10 DIAGNOSIS — S52.021D CLOSED FRACTURE OF OLECRANON PROCESS OF RIGHT ULNA WITH ROUTINE HEALING, SUBSEQUENT ENCOUNTER: ICD-10-CM

## 2022-06-10 DIAGNOSIS — E66.01 MORBID OBESITY WITH BMI OF 40.0-44.9, ADULT: ICD-10-CM

## 2022-06-10 LAB
ANION GAP SERPL CALCULATED.3IONS-SCNC: 11 MMOL/L (ref 5–15)
BUN SERPL-MCNC: 13 MG/DL (ref 8–23)
BUN/CREAT SERPL: 17.3 (ref 7–25)
CALCIUM SPEC-SCNC: 9.6 MG/DL (ref 8.6–10.5)
CHLORIDE SERPL-SCNC: 102 MMOL/L (ref 98–107)
CO2 SERPL-SCNC: 26 MMOL/L (ref 22–29)
CREAT SERPL-MCNC: 0.75 MG/DL (ref 0.57–1)
EGFRCR SERPLBLD CKD-EPI 2021: 89.6 ML/MIN/1.73
GLUCOSE BLDC GLUCOMTR-MCNC: 159 MG/DL (ref 70–130)
GLUCOSE BLDC GLUCOMTR-MCNC: 160 MG/DL (ref 70–130)
GLUCOSE SERPL-MCNC: 147 MG/DL (ref 65–99)
MRSA DNA SPEC QL NAA+PROBE: NEGATIVE
POTASSIUM SERPL-SCNC: 4 MMOL/L (ref 3.5–5.2)
SARS-COV-2 RNA RESP QL NAA+PROBE: NOT DETECTED
SODIUM SERPL-SCNC: 139 MMOL/L (ref 136–145)

## 2022-06-10 PROCEDURE — 25010000002 DEXAMETHASONE PER 1 MG: Performed by: NURSE ANESTHETIST, CERTIFIED REGISTERED

## 2022-06-10 PROCEDURE — 76000 FLUOROSCOPY <1 HR PHYS/QHP: CPT

## 2022-06-10 PROCEDURE — 93005 ELECTROCARDIOGRAM TRACING: CPT | Performed by: ANESTHESIOLOGY

## 2022-06-10 PROCEDURE — 87635 SARS-COV-2 COVID-19 AMP PRB: CPT | Performed by: ORTHOPAEDIC SURGERY

## 2022-06-10 PROCEDURE — C1713 ANCHOR/SCREW BN/BN,TIS/BN: HCPCS | Performed by: ORTHOPAEDIC SURGERY

## 2022-06-10 PROCEDURE — 25010000002 ONDANSETRON PER 1 MG: Performed by: NURSE ANESTHETIST, CERTIFIED REGISTERED

## 2022-06-10 PROCEDURE — 25010000002 MIDAZOLAM PER 1 MG: Performed by: NURSE ANESTHETIST, CERTIFIED REGISTERED

## 2022-06-10 PROCEDURE — 25010000002 CEFAZOLIN PER 500 MG: Performed by: ORTHOPAEDIC SURGERY

## 2022-06-10 PROCEDURE — 93010 ELECTROCARDIOGRAM REPORT: CPT | Performed by: INTERNAL MEDICINE

## 2022-06-10 PROCEDURE — C9803 HOPD COVID-19 SPEC COLLECT: HCPCS

## 2022-06-10 PROCEDURE — 25010000002 FENTANYL CITRATE (PF) 50 MCG/ML SOLUTION: Performed by: NURSE ANESTHETIST, CERTIFIED REGISTERED

## 2022-06-10 PROCEDURE — 25010000002 ROPIVACAINE PER 1 MG: Performed by: ANESTHESIOLOGY

## 2022-06-10 PROCEDURE — 87641 MR-STAPH DNA AMP PROBE: CPT | Performed by: ORTHOPAEDIC SURGERY

## 2022-06-10 PROCEDURE — 82962 GLUCOSE BLOOD TEST: CPT

## 2022-06-10 PROCEDURE — 24685 OPTX ULNAR FX PROX END W/FIX: CPT | Performed by: ORTHOPAEDIC SURGERY

## 2022-06-10 PROCEDURE — 25010000002 PROPOFOL 10 MG/ML EMULSION: Performed by: NURSE ANESTHETIST, CERTIFIED REGISTERED

## 2022-06-10 PROCEDURE — 80048 BASIC METABOLIC PNL TOTAL CA: CPT | Performed by: ANESTHESIOLOGY

## 2022-06-10 DEVICE — SCRW CORT S/TAP 3.5X18MM: Type: IMPLANTABLE DEVICE | Site: ELBOW | Status: FUNCTIONAL

## 2022-06-10 DEVICE — SCRW LK S/TAP T8STRDRV 2.7X26MM: Type: IMPLANTABLE DEVICE | Site: ELBOW | Status: FUNCTIONAL

## 2022-06-10 DEVICE — SCRW LK S/TAP T8STRDRV 2.7X34MM: Type: IMPLANTABLE DEVICE | Site: ELBOW | Status: FUNCTIONAL

## 2022-06-10 DEVICE — SCRW LK S/TAP T8STRDRV 2.7X24MM: Type: IMPLANTABLE DEVICE | Site: ELBOW | Status: FUNCTIONAL

## 2022-06-10 DEVICE — PLT OLEC VA/LCP 2H SS 2.7/3.5X90MM RT: Type: IMPLANTABLE DEVICE | Site: ELBOW | Status: FUNCTIONAL

## 2022-06-10 DEVICE — SCRW LK S/TAP T8STRDRV 2.7X14MM: Type: IMPLANTABLE DEVICE | Site: ELBOW | Status: FUNCTIONAL

## 2022-06-10 DEVICE — SCRW LK S/TAP T8STRDRV 2.7X30MM: Type: IMPLANTABLE DEVICE | Site: ELBOW | Status: FUNCTIONAL

## 2022-06-10 DEVICE — SCRW LK S/TAP T8STRDRV 2.7X16MM: Type: IMPLANTABLE DEVICE | Site: ELBOW | Status: FUNCTIONAL

## 2022-06-10 RX ORDER — NICOTINE POLACRILEX 4 MG
15 LOZENGE BUCCAL
Status: DISCONTINUED | OUTPATIENT
Start: 2022-06-10 | End: 2022-06-11 | Stop reason: HOSPADM

## 2022-06-10 RX ORDER — LEVOTHYROXINE SODIUM 0.03 MG/1
25 TABLET ORAL DAILY
Status: DISCONTINUED | OUTPATIENT
Start: 2022-06-11 | End: 2022-06-11 | Stop reason: HOSPADM

## 2022-06-10 RX ORDER — LISINOPRIL 10 MG/1
10 TABLET ORAL DAILY
Status: DISCONTINUED | OUTPATIENT
Start: 2022-06-10 | End: 2022-06-11 | Stop reason: HOSPADM

## 2022-06-10 RX ORDER — MIDAZOLAM HYDROCHLORIDE 1 MG/ML
INJECTION INTRAMUSCULAR; INTRAVENOUS AS NEEDED
Status: DISCONTINUED | OUTPATIENT
Start: 2022-06-10 | End: 2022-06-10 | Stop reason: SURG

## 2022-06-10 RX ORDER — ALBUTEROL SULFATE 90 UG/1
2 AEROSOL, METERED RESPIRATORY (INHALATION) EVERY 4 HOURS PRN
Status: DISCONTINUED | OUTPATIENT
Start: 2022-06-10 | End: 2022-06-10 | Stop reason: ALTCHOICE

## 2022-06-10 RX ORDER — MONTELUKAST SODIUM 10 MG/1
10 TABLET ORAL NIGHTLY
Status: DISCONTINUED | OUTPATIENT
Start: 2022-06-10 | End: 2022-06-11 | Stop reason: HOSPADM

## 2022-06-10 RX ORDER — INSULIN ASPART 100 [IU]/ML
0-14 INJECTION, SOLUTION INTRAVENOUS; SUBCUTANEOUS
Status: DISCONTINUED | OUTPATIENT
Start: 2022-06-10 | End: 2022-06-11 | Stop reason: HOSPADM

## 2022-06-10 RX ORDER — ACETAMINOPHEN 650 MG/1
650 SUPPOSITORY RECTAL ONCE AS NEEDED
Status: DISCONTINUED | OUTPATIENT
Start: 2022-06-10 | End: 2022-06-10

## 2022-06-10 RX ORDER — PROMETHAZINE HYDROCHLORIDE 25 MG/1
25 SUPPOSITORY RECTAL ONCE AS NEEDED
Status: DISCONTINUED | OUTPATIENT
Start: 2022-06-10 | End: 2022-06-10

## 2022-06-10 RX ORDER — DEXTROSE MONOHYDRATE 25 G/50ML
25 INJECTION, SOLUTION INTRAVENOUS
Status: DISCONTINUED | OUTPATIENT
Start: 2022-06-10 | End: 2022-06-11 | Stop reason: HOSPADM

## 2022-06-10 RX ORDER — LIDOCAINE HYDROCHLORIDE 20 MG/ML
INJECTION, SOLUTION INFILTRATION; PERINEURAL AS NEEDED
Status: DISCONTINUED | OUTPATIENT
Start: 2022-06-10 | End: 2022-06-10 | Stop reason: SURG

## 2022-06-10 RX ORDER — ALBUTEROL SULFATE 2.5 MG/3ML
2.5 SOLUTION RESPIRATORY (INHALATION) EVERY 4 HOURS PRN
Status: DISCONTINUED | OUTPATIENT
Start: 2022-06-10 | End: 2022-06-11 | Stop reason: HOSPADM

## 2022-06-10 RX ORDER — SODIUM CHLORIDE 9 MG/ML
1000 INJECTION, SOLUTION INTRAVENOUS CONTINUOUS
Status: DISCONTINUED | OUTPATIENT
Start: 2022-06-10 | End: 2022-06-11 | Stop reason: HOSPADM

## 2022-06-10 RX ORDER — ONDANSETRON 2 MG/ML
INJECTION INTRAMUSCULAR; INTRAVENOUS AS NEEDED
Status: DISCONTINUED | OUTPATIENT
Start: 2022-06-10 | End: 2022-06-10 | Stop reason: SURG

## 2022-06-10 RX ORDER — HYDROCODONE BITARTRATE AND ACETAMINOPHEN 10; 325 MG/1; MG/1
1 TABLET ORAL EVERY 4 HOURS PRN
Status: DISCONTINUED | OUTPATIENT
Start: 2022-06-10 | End: 2022-06-11 | Stop reason: HOSPADM

## 2022-06-10 RX ORDER — EPHEDRINE SULFATE 50 MG/ML
5 INJECTION, SOLUTION INTRAVENOUS ONCE AS NEEDED
Status: DISCONTINUED | OUTPATIENT
Start: 2022-06-10 | End: 2022-06-10

## 2022-06-10 RX ORDER — BUPIVACAINE HCL/0.9 % NACL/PF 0.1 %
2 PLASTIC BAG, INJECTION (ML) EPIDURAL ONCE
Status: COMPLETED | OUTPATIENT
Start: 2022-06-10 | End: 2022-06-10

## 2022-06-10 RX ORDER — ONDANSETRON 2 MG/ML
4 INJECTION INTRAMUSCULAR; INTRAVENOUS EVERY 6 HOURS PRN
Status: DISCONTINUED | OUTPATIENT
Start: 2022-06-10 | End: 2022-06-11 | Stop reason: HOSPADM

## 2022-06-10 RX ORDER — FLUMAZENIL 0.1 MG/ML
0.2 INJECTION INTRAVENOUS AS NEEDED
Status: DISCONTINUED | OUTPATIENT
Start: 2022-06-10 | End: 2022-06-10

## 2022-06-10 RX ORDER — PROPOFOL 10 MG/ML
VIAL (ML) INTRAVENOUS AS NEEDED
Status: DISCONTINUED | OUTPATIENT
Start: 2022-06-10 | End: 2022-06-10 | Stop reason: SURG

## 2022-06-10 RX ORDER — CEFAZOLIN SODIUM IN 0.9 % NACL 3 G/100 ML
3 INTRAVENOUS SOLUTION, PIGGYBACK (ML) INTRAVENOUS EVERY 8 HOURS
Status: COMPLETED | OUTPATIENT
Start: 2022-06-10 | End: 2022-06-11

## 2022-06-10 RX ORDER — DIPHENHYDRAMINE HYDROCHLORIDE 50 MG/ML
12.5 INJECTION INTRAMUSCULAR; INTRAVENOUS
Status: DISCONTINUED | OUTPATIENT
Start: 2022-06-10 | End: 2022-06-10

## 2022-06-10 RX ORDER — ROPIVACAINE HYDROCHLORIDE 5 MG/ML
INJECTION, SOLUTION EPIDURAL; INFILTRATION; PERINEURAL
Status: COMPLETED | OUTPATIENT
Start: 2022-06-10 | End: 2022-06-10

## 2022-06-10 RX ORDER — DEXAMETHASONE SODIUM PHOSPHATE 4 MG/ML
INJECTION, SOLUTION INTRA-ARTICULAR; INTRALESIONAL; INTRAMUSCULAR; INTRAVENOUS; SOFT TISSUE AS NEEDED
Status: DISCONTINUED | OUTPATIENT
Start: 2022-06-10 | End: 2022-06-10 | Stop reason: SURG

## 2022-06-10 RX ORDER — FENTANYL CITRATE 50 UG/ML
INJECTION, SOLUTION INTRAMUSCULAR; INTRAVENOUS AS NEEDED
Status: DISCONTINUED | OUTPATIENT
Start: 2022-06-10 | End: 2022-06-10 | Stop reason: SURG

## 2022-06-10 RX ORDER — IBUPROFEN 800 MG/1
800 TABLET ORAL 3 TIMES DAILY PRN
Status: DISCONTINUED | OUTPATIENT
Start: 2022-06-10 | End: 2022-06-11 | Stop reason: HOSPADM

## 2022-06-10 RX ORDER — ONDANSETRON 2 MG/ML
4 INJECTION INTRAMUSCULAR; INTRAVENOUS ONCE AS NEEDED
Status: DISCONTINUED | OUTPATIENT
Start: 2022-06-10 | End: 2022-06-10

## 2022-06-10 RX ORDER — ACETAMINOPHEN 325 MG/1
650 TABLET ORAL ONCE AS NEEDED
Status: DISCONTINUED | OUTPATIENT
Start: 2022-06-10 | End: 2022-06-10

## 2022-06-10 RX ORDER — PROMETHAZINE HYDROCHLORIDE 25 MG/1
25 TABLET ORAL ONCE AS NEEDED
Status: DISCONTINUED | OUTPATIENT
Start: 2022-06-10 | End: 2022-06-10

## 2022-06-10 RX ORDER — PROMETHAZINE HYDROCHLORIDE 25 MG/1
25 TABLET ORAL EVERY 6 HOURS PRN
Status: DISCONTINUED | OUTPATIENT
Start: 2022-06-10 | End: 2022-06-11 | Stop reason: HOSPADM

## 2022-06-10 RX ORDER — NALOXONE HCL 0.4 MG/ML
0.4 VIAL (ML) INJECTION AS NEEDED
Status: DISCONTINUED | OUTPATIENT
Start: 2022-06-10 | End: 2022-06-10

## 2022-06-10 RX ADMIN — ROPIVACAINE HYDROCHLORIDE 20 ML: 5 INJECTION, SOLUTION EPIDURAL; INFILTRATION; PERINEURAL at 16:34

## 2022-06-10 RX ADMIN — LIDOCAINE HYDROCHLORIDE 40 MG: 20 INJECTION, SOLUTION INFILTRATION; PERINEURAL at 16:39

## 2022-06-10 RX ADMIN — PROPOFOL 20 MG: 10 INJECTION, EMULSION INTRAVENOUS at 18:05

## 2022-06-10 RX ADMIN — SODIUM CHLORIDE 1000 ML: 9 INJECTION, SOLUTION INTRAVENOUS at 13:41

## 2022-06-10 RX ADMIN — Medication 2 G: at 16:22

## 2022-06-10 RX ADMIN — FENTANYL CITRATE 50 MCG: 50 INJECTION INTRAMUSCULAR; INTRAVENOUS at 16:39

## 2022-06-10 RX ADMIN — DEXAMETHASONE SODIUM PHOSPHATE 4 MG: 4 INJECTION, SOLUTION INTRAMUSCULAR; INTRAVENOUS at 16:51

## 2022-06-10 RX ADMIN — MIDAZOLAM HYDROCHLORIDE 2 MG: 1 INJECTION, SOLUTION INTRAMUSCULAR; INTRAVENOUS at 16:07

## 2022-06-10 RX ADMIN — CEFAZOLIN 3 G: 10 INJECTION, POWDER, FOR SOLUTION INTRAVENOUS; PARENTERAL at 20:45

## 2022-06-10 RX ADMIN — PROPOFOL 140 MG: 10 INJECTION, EMULSION INTRAVENOUS at 16:39

## 2022-06-10 RX ADMIN — FENTANYL CITRATE 50 MCG: 50 INJECTION INTRAMUSCULAR; INTRAVENOUS at 16:16

## 2022-06-10 RX ADMIN — ONDANSETRON 4 MG: 2 INJECTION INTRAMUSCULAR; INTRAVENOUS at 16:52

## 2022-06-10 NOTE — ANESTHESIA POSTPROCEDURE EVALUATION
Patient: Prema Curtis    Procedure Summary     Date: 06/10/22 Room / Location: [facility] OR [address] [place] [address] OR    Anesthesia Start: 1612 Anesthesia Stop: 1835    Procedure: RIGHT ELBOW OPEN REDUCTION INTERNAL FIXATION (Right Elbow) Diagnosis:       Closed fracture of proximal end of right radius with routine healing, unspecified fracture morphology, subsequent encounter      Closed fracture of olecranon process of right ulna with routine healing, subsequent encounter      Type 2 diabetes mellitus with complication, without long-term current use of insulin (HCC)      Essential hypertension      Morbid obesity with BMI of 40.0-44.9, adult (HCC)      Diabetic peripheral neuropathy (HCC)      (Closed fracture of proximal end of right radius with routine healing, unspecified fracture morphology, subsequent encounter [S52.101D])      (Closed fracture of olecranon process of right ulna with routine healing, subsequent encounter [S52.021D])      (Type 2 diabetes mellitus with complication, without long-term current use of insulin (HCC) [E11.8])      (Essential hypertension [I10])      (Morbid obesity with BMI of 40.0-44.9, adult (HCC) [E66.01, Z68.41])      (Diabetic peripheral neuropathy (HCC) [E11.42])    Surgeons: Cisco Davenport MD Provider: Kyle Frost MD    Anesthesia Type: general ASA Status: 3          Anesthesia Type: general    Vitals  Vitals Value Taken Time   /52 06/10/22 1828   Temp 97.7 °F (36.5 °C) 06/10/22 1828   Pulse 92 06/10/22 1828   Resp 18 06/10/22 1828   SpO2 95 % 06/10/22 1828           Post Anesthesia Care and Evaluation    Patient location during evaluation: PACU  Patient participation: complete - patient participated  Level of consciousness: awake and alert  Pain management: adequate    Airway patency: patent  Anesthetic complications: No anesthetic complications  PONV Status: none  Cardiovascular status: acceptable and stable  Respiratory status: acceptable and nasal  cannula  Hydration status: acceptable    Comments: ---------------------------               06/10/22                      1837         ---------------------------   BP:          123/52         Pulse:         92           Resp:          18           Temp:   97.7 °F (36.5 °C)   SpO2:          95%         ---------------------------

## 2022-06-10 NOTE — ANESTHESIA PREPROCEDURE EVALUATION
Anesthesia Evaluation     Patient summary reviewed and Nursing notes reviewed   no history of anesthetic complications:  NPO Solid Status: > 8 hours  NPO Liquid Status: > 2 hours           Airway   Mallampati: II  TM distance: >3 FB  Neck ROM: full  possible difficult intubation, Small opening and Anterior  Comment: Nickerson available on induction.  Dental    (+) poor dentation    Pulmonary     breath sounds clear to auscultation  (+) asthma,  (-) shortness of breath, not a smoker  Cardiovascular     Rhythm: regular  Rate: normal    (+) hypertension, hyperlipidemia,   (-) past MI, CAD, angina, DAWKINS, murmur      Neuro/Psych  (+) numbness (Diabetic peripheral neuropathy.), psychiatric history Anxiety and Depression,    GI/Hepatic/Renal/Endo    (+) morbid obesity,  diabetes mellitus type 2, thyroid problem hypothyroidism    Musculoskeletal         ROS comment: Tripped and fell fracturing right arm.  Abdominal   (+) obese,    Substance History - negative use     OB/GYN negative ob/gyn ROS         Other   arthritis,                      Anesthesia Plan    ASA 3     general     intravenous induction     Anesthetic plan, risks, benefits, and alternatives have been provided, discussed and informed consent has been obtained with: patient and sibling.        CODE STATUS:

## 2022-06-10 NOTE — OP NOTE
ELBOW OPEN REDUCTION INTERNAL FIXATION  Procedure Note    Name:    Prema Curtis  YOB: 1960  Date of surgery:   6/10/2022    Pre-op Diagnosis:   Closed fracture of proximal end of right radius with routine healing, unspecified fracture morphology, subsequent encounter [S52.101D]  Closed fracture of olecranon process of right ulna with routine healing, subsequent encounter [S52.021D]  Type 2 diabetes mellitus with complication, without long-term current use of insulin (HCC) [E11.8]  Essential hypertension [I10]  Morbid obesity with BMI of 40.0-44.9, adult (HCC) [E66.01, Z68.41]  Diabetic peripheral neuropathy (HCC) [E11.42]    Post-op Diagnosis:    Post-Op Diagnosis Codes:     * Closed fracture of proximal end of right radius with routine healing, unspecified fracture morphology, subsequent encounter [S52.101D]     * Closed fracture of olecranon process of right ulna with routine healing, subsequent encounter [S52.021D]     * Type 2 diabetes mellitus with complication, without long-term current use of insulin (HCC) [E11.8]     * Essential hypertension [I10]     * Morbid obesity with BMI of 40.0-44.9, adult (HCC) [E66.01, Z68.41]     * Diabetic peripheral neuropathy (HCC) [E11.42]    Procedure:  Procedure(s):  RIGHT ELBOW OPEN REDUCTION INTERNAL FIXATION    Surgeon:  Surgeon(s):  Cisco Davenport MD    Assistant: Jillian Hyde CSA was responsible for performing the following activities: Retraction, Suction, Irrigation, Suturing, Closing and Placing Dressing and their skilled assistance was necessary for the success of this case.     Anesthesia: General    Staff:   Circulator: Kiera Norman RN  Scrub Person: Marshal Pinzon  Assistant: Jillian Hyde CSA    Estimated Blood Loss: minimal    Specimens:                None      Drains: * No LDAs found *    Findings:  As below    Complications: None    IMPLANTS:   Nothing was implanted during the  procedure      PROCEDURE:    Once consent was obtained the patient was taken to the operating room and once adequate anesthesia was obtained the patient was prepped and draped in the standard surgical fashion.  Pneumatic tourniquet was applied and inflated to 250 mmHg.  The surgical timeout was performed.    The arm was draped over the chest of the patient and an incision was made overlying the subcutaneous border of the ulna curvilinear on the radial aspect of the olecranon.  Dissection was carried down to the subcutaneous border of the ulna and the periosteal elevator was used to elevate radially and ulnarly to expose the ulnar border and to expose the fracture site.  The fracture site was then clearly identified and any soft tissue or fibrous tissue that had collected within the fracture site was removed using a [place] elevator as well as the rongeur.  Care was taken on the ulnar aspect of the olecranon to not proceed too far ulnarward and to be conscious and wary of the ulnar nerve.    Once the fracture site was felt to be adequately mobilized then the plate was applied on the posterior border of the ulna and over the olecranon.  A drill hole was placed on the ulnar border of the ulna distal to the fracture site to allow for the pointed tenaculum to be placed.  The other end of the pointed tenaculum was placed on the tip of the olecranon to allow for some compression and maintenance of reduction of the fracture site.  2 locking screws were then placed in the proximal portion of the olecranon affixing the plate to the bone.  A nonlocking screw was then placed in one of the distal most holes in an eccentric position to apply compression across the fracture site.  The arm was then rotated to allow for C arm imaging and the compression across the fracture site was felt to be adequate and the fracture seem to be reduced as well as the joint surface of the olecranon.  Screw lengths at this point were felt to be  adequate as well.    The arm was then draped back across the chest and then the remainder of the proximal locking screws were placed using the drill guide and the locking guide.  The screws were then inserted of the appropriate length.  The locking screws were then placed just distal to the fracture site and then the most distal 3.5 mm locking screw was placed.  Finalize fixation was then visualized using C arm imaging and was found to be acceptable.  There were 2 screws that were changed due to screw length issues.  Joint line was noted to be smooth and concentric.  The fracture was seemingly reduced and screw lengths were acceptable as well.  The wound was then copiously irrigated with saline.  Tourniquet was let down and hemostasis was obtained.  The deep subcutaneous tissue was closed over the plate using 0 Vicryl.  The subcutaneous tissue was then closed using 0 Vicryl and 2-0 Vicryl and the skin was closed with skin staples.  The wound was covered with Xeroform gauze, 4 x 4's, and a well-padded posterior splint was applied with the arm in approximately 90 degrees.  Patient was then awakened and taken to the recovery room in good condition.  She tolerated procedure very well.    Cisco Davenport MD     Date: 6/10/2022  Time: 18:26 CDT

## 2022-06-10 NOTE — INTERVAL H&P NOTE
"H&P reviewed. The patient was examined and there are no changes to the H&P.      Vitals:    06/10/22 1331   BP: 149/67   Pulse: 87   Resp: 18   Temp: 98.4 °F (36.9 °C)   SpO2: 96%       Allergies   Allergen Reactions    Apple Other (See Comments)     Alpha gal    Beef-Derived Products Other (See Comments)     Alpha gal    Milk-Related Compounds Other (See Comments)     Alpha gal    Onion Other (See Comments)     Alpha gal    Penicillins Other (See Comments)     \"dont know\"    Pork-Derived Products Other (See Comments)     Alpha gal    Ragwitek [Short Ragweed Pollen Ext] Other (See Comments)     Nasal congestion    Shellfish-Derived Products Other (See Comments)     Alpha gal    Strawberry Extract Other (See Comments)     Alpha gal    Sulfa Antibiotics Other (See Comments)     \"dont know\"    Tomato Other (See Comments)     Alpha gal    Latex Rash       Prior to Admission medications    Medication Sig Start Date End Date Taking? Authorizing Provider   empagliflozin (JARDIANCE) 25 MG tablet tablet Take 25 mg by mouth Daily.   Yes Meera Foote MD   glucose blood (Contour Next Test) test strip Test FSBS TID and prn due to labile blood sugars 3/3/22  Yes Olga Rojas MD   HYDROcodone-acetaminophen (Norco)  MG per tablet Take 1 tablet by mouth Every 6 (Six) Hours As Needed for Moderate Pain . 5/16/22  Yes Olga Rojas MD   levothyroxine (SYNTHROID, LEVOTHROID) 25 MCG tablet Take 25 mcg by mouth Daily.   Yes Meera Foote MD   montelukast (SINGULAIR) 10 MG tablet Take 10 mg by mouth Every Night.   Yes Meera Foote MD   mupirocin (Bactroban) 2 % ointment Apply  topically to the appropriate area as directed 3 (Three) Times a Day.  Patient taking differently: Apply 1 application topically to the appropriate area as directed 3 (Three) Times a Day. 8/17/21  Yes Olga Rojas MD   nystatin (MYCOSTATIN) 043310 UNIT/GM powder Apply 1 application topically to the appropriate area as " "directed 4 (Four) Times a Day.   Yes Meera Foote MD   promethazine (PHENERGAN) 25 MG tablet Take 1 tablet by mouth Every 6 (Six) Hours As Needed for Nausea or Vomiting. 3/21/22  Yes Olga Rojas MD   rosuvastatin (CRESTOR) 5 MG tablet Take 5 mg by mouth Daily.   Yes Meera Foote MD   triamcinolone (KENALOG) 0.1 % cream Apply  topically to the appropriate area as directed 2 (Two) Times a Day. 11/23/20  Yes Olga Rojas MD   albuterol sulfate  (90 Base) MCG/ACT inhaler Inhale 2 puffs Every 4 (Four) Hours As Needed for Wheezing.    Meera Foote MD   azelastine (ASTELIN) 0.1 % nasal spray 2 sprays 2 (Two) Times a Day. 4/27/19   Meera Foote MD   Easy Touch Lancets 28G/Twist misc Use as directed at least once daily and prn 4/21/20   Olga Rojas MD   ibuprofen (ADVIL,MOTRIN) 800 MG tablet Take 1 tablet by mouth 3 (Three) Times a Day As Needed for Mild Pain . 12/22/20   Olga Rojas MD   lisinopril (PRINIVIL,ZESTRIL) 10 MG tablet Take 1 tablet by mouth Daily. 10/28/20   Olga Rojas MD       Past Medical History:   Diagnosis Date    Ankle joint pain     Anxious mood     Arthritis     Cellulitis and abscess     L Axilla    Chronic pain of right knee 11/01/2016    Community acquired pneumonia     Cutaneous abscess     of limb    Development delay     niece states pt is \"slow\" and lives with brother who is guardian    Diabetes mellitus (HCC)     Disease of thyroid gland     Drug therapy     long-term    Enlarged lymph nodes     L Axilla    Essential hypertension     Fatigue     Foot pain     VETO (generalized anxiety disorder)     H/O mammogram 02/12/2015    Hidradenitis suppurativa     Knee pain     Myalgia     Obesity     Plantar fasciitis     Psoriasis of scalp     Skin rash     Superficial injury of breast without infection     Tenderness over spine     lumbar region    Tinea corporis     Vitamin B12 deficiency (non anemic)        Past Surgical History: "   Procedure Laterality Date    BREAST BIOPSY Left     Negative    TOTAL KNEE ARTHROPLASTY Right 11/11/2013       Social History     Socioeconomic History    Marital status: Single   Tobacco Use    Smoking status: Never Smoker    Smokeless tobacco: Never Used   Vaping Use    Vaping Use: Never used   Substance and Sexual Activity    Alcohol use: No    Drug use: No    Sexual activity: Defer       Family History   Problem Relation Age of Onset    Diabetes Other        Plan Open reduction internal fixation of right olecranon fracture.    The patient voiced understanding of the risks, benefits, and alternative forms of treatment that were discussed and the patient consents to proceed with surgery.  All risks, benefits and alternatives were discussed.  Risks include, but not exclusive to anesthetic complications, including death, MI, CVA, infection, bleeding DVT, fracture, residual pain and need for future surgery.    This discussion was held with the patient by Cisco Davenport MD and all questions were answered.           This document has been electronically signed by Cisco Davenport MD on Yue 10, 2022 14:46 CDT

## 2022-06-10 NOTE — ANESTHESIA PROCEDURE NOTES
Airway  Urgency: elective    Date/Time: 6/10/2022 4:43 PM    General Information and Staff    Patient location during procedure: OR    Indications and Patient Condition  Indications for airway management: airway protection    Preoxygenated: yes  MILS maintained throughout  Mask difficulty assessment: 1 - vent by mask    Final Airway Details  Final airway type: supraglottic airway      Successful airway: I-gel  Size 4    Cormack-Lehane Classification: grade I - full view of glottis  Number of attempts at approach: 1  Assessment: lips, teeth, and gum same as pre-op and atraumatic intubation

## 2022-06-11 VITALS
TEMPERATURE: 98.1 F | BODY MASS INDEX: 45.55 KG/M2 | SYSTOLIC BLOOD PRESSURE: 111 MMHG | RESPIRATION RATE: 18 BRPM | HEIGHT: 60 IN | WEIGHT: 232 LBS | OXYGEN SATURATION: 94 % | DIASTOLIC BLOOD PRESSURE: 57 MMHG | HEART RATE: 91 BPM

## 2022-06-11 LAB
GLUCOSE BLDC GLUCOMTR-MCNC: 124 MG/DL (ref 70–130)
GLUCOSE BLDC GLUCOMTR-MCNC: 139 MG/DL (ref 70–130)
QT INTERVAL: 370 MS
QTC INTERVAL: 442 MS

## 2022-06-11 PROCEDURE — A9270 NON-COVERED ITEM OR SERVICE: HCPCS | Performed by: ORTHOPAEDIC SURGERY

## 2022-06-11 PROCEDURE — 63710000001 HYDROCODONE-ACETAMINOPHEN 10-325 MG TABLET: Performed by: ORTHOPAEDIC SURGERY

## 2022-06-11 PROCEDURE — 63710000001 LISINOPRIL 10 MG TABLET: Performed by: ORTHOPAEDIC SURGERY

## 2022-06-11 PROCEDURE — 82962 GLUCOSE BLOOD TEST: CPT

## 2022-06-11 PROCEDURE — 63710000001 LEVOTHYROXINE 25 MCG TABLET: Performed by: ORTHOPAEDIC SURGERY

## 2022-06-11 PROCEDURE — 25010000002 CEFAZOLIN PER 500 MG: Performed by: ORTHOPAEDIC SURGERY

## 2022-06-11 PROCEDURE — 97166 OT EVAL MOD COMPLEX 45 MIN: CPT

## 2022-06-11 PROCEDURE — 99024 POSTOP FOLLOW-UP VISIT: CPT | Performed by: ORTHOPAEDIC SURGERY

## 2022-06-11 PROCEDURE — 97162 PT EVAL MOD COMPLEX 30 MIN: CPT | Performed by: PHYSICAL THERAPIST

## 2022-06-11 RX ORDER — ONDANSETRON 4 MG/1
4 TABLET, FILM COATED ORAL ONCE AS NEEDED
Status: DISCONTINUED | OUTPATIENT
Start: 2022-06-11 | End: 2022-06-11 | Stop reason: HOSPADM

## 2022-06-11 RX ADMIN — LEVOTHYROXINE SODIUM 25 MCG: 25 TABLET ORAL at 09:48

## 2022-06-11 RX ADMIN — HYDROCODONE BITARTRATE AND ACETAMINOPHEN 1 TABLET: 10; 325 TABLET ORAL at 09:48

## 2022-06-11 RX ADMIN — CEFAZOLIN 3 G: 10 INJECTION, POWDER, FOR SOLUTION INTRAVENOUS; PARENTERAL at 04:44

## 2022-06-11 RX ADMIN — LISINOPRIL 10 MG: 10 TABLET ORAL at 09:48

## 2022-06-11 NOTE — THERAPY EVALUATION
Patient Name: Prema Curtis  : 1960    MRN: [id number]                              Today's Date: 2022       Admit Date: 6/10/2022    Visit Dx:     ICD-10-CM ICD-9-CM   1. Essential hypertension  I10 401.9   2. Diabetic peripheral neuropathy (HCC)  E11.42 250.60     357.2   3. Morbid obesity with BMI of 40.0-44.9, adult ([facility])  E66.01 278.01    Z68.41 V85.41   4. Closed fracture of proximal end of right radius with routine healing, unspecified fracture morphology, subsequent encounter  S52.101D V54.12   5. Type 2 diabetes mellitus with complication, without long-term current use of insulin ([facility])  E11.8 250.90   6. Closed fracture of olecranon process of right ulna with routine healing, subsequent encounter  S52.021D V54.12   7. Impaired functional mobility, balance, gait, and endurance  Z74.09 V49.89     Patient Active Problem List   Diagnosis   • Allergic reaction   • Vitamin B12 deficiency (non anemic)   • Tinea corporis   • Tenderness over spine   • Superficial injury of breast without infection   • Skin rash   • Psoriasis of scalp   • Plantar fasciitis   • Morbid obesity, unspecified obesity type (HCC)   • Myalgia   • Knee pain   • Hypertensive disorder   • Hidradenitis suppurativa   • H/O mammogram   • VETO (generalized anxiety disorder)   • Foot pain   • Fatigue   • Essential hypertension   • Enlarged lymph nodes   • Drug therapy   • Cutaneous abscess   • Community acquired pneumonia   • Anxious mood   • Ankle joint pain   • Chronic pain of right knee   • Multiple joint pain   • Presence of right artificial knee joint   • Bilateral low back pain without sciatica   • Type 2 diabetes mellitus with complication, without long-term current use of insulin (HCC)   • Right elbow pain   • Closed fracture of upper end of right radius with routine healing   • Closed fracture of right olecranon process   • Diabetic peripheral neuropathy (HCC)     Past Medical History:   Diagnosis Date   • Ankle joint pain  "   • Anxious mood    • Arthritis    • Cellulitis and abscess     L Axilla   • Chronic pain of right knee 11/01/2016   • Community acquired pneumonia    • Cutaneous abscess     of limb   • Development delay     niece states pt is \"slow\" and lives with brother who is guardian   • Diabetes mellitus (HCC)    • Disease of thyroid gland    • Drug therapy     long-term   • Enlarged lymph nodes     L Axilla   • Essential hypertension    • Fatigue    • Foot pain    • VETO (generalized anxiety disorder)    • H/O mammogram 02/12/2015   • Hidradenitis suppurativa    • Knee pain    • Myalgia    • Obesity    • Plantar fasciitis    • Psoriasis of scalp    • Skin rash    • Superficial injury of breast without infection    • Tenderness over spine     lumbar region   • Tinea corporis    • Vitamin B12 deficiency (non anemic)      Past Surgical History:   Procedure Laterality Date   • BREAST BIOPSY Left     Negative   • TOTAL KNEE ARTHROPLASTY Right 11/11/2013      General Information     Row Name 06/11/22 0908          Physical Therapy Time and Intention    Document Type evaluation  -BS     Mode of Treatment co-treatment;physical therapy;occupational therapy  -BS     Row Name 06/11/22 0908          General Information    Patient Profile Reviewed yes  -BS     Prior Level of Function independent:;gait;transfer;ADL's  -BS     Existing Precautions/Restrictions fall;weight bearing  -BS     Row Name 06/11/22 0908          Living Environment    People in Home sibling(s)  -BS     Row Name 06/11/22 0908          Home Main Entrance    Number of Stairs, Main Entrance one  -BS     Stair Railings, Main Entrance none  -BS     Row Name 06/11/22 0908          Stairs Within Home, Primary    Stairs, Within Home, Primary 1  -BS     Row Name 06/11/22 0908          Cognition    Orientation Status (Cognition) oriented x 4  -BS           User Key  (r) = Recorded By, (t) = Taken By, (c) = Cosigned By    Initials Name Provider Type    BS Catrachito Edge, PT " Physical Therapist               Mobility     Row Name 06/11/22 0908          Bed Mobility    Bed Mobility supine-sit  -BS     Supine-Sit [place] (Bed Mobility) supervision  -BS     Assistive Device (Bed Mobility) head of bed elevated;bed rails  -BS     Row Name 06/11/22 0908          Sit-Stand Transfer    Sit-Stand [place] (Transfers) contact guard  -BS     Assistive Device (Sit-Stand Transfers) cane, straight  -BS     Row Name 06/11/22 0908          Gait/Stairs (Locomotion)    [place] Level (Gait) contact guard  -BS     Assistive Device (Gait) other (see comments)  hurrycane  -BS     Distance in Feet (Gait) 5' x 2, 30' x 1 with 1L O2 via long extension line NC  -BS     Deviations/Abnormal Patterns (Gait) ataxic  -BS     [place] Level (Stairs) not tested  -BS     Comment, (Gait/Stairs) slow but steady gait pattern with use of her own hurrycane.  -BS           User Key  (r) = Recorded By, (t) = Taken By, (c) = Cosigned By    Initials Name Provider Type    Catrachito Luis PT Physical Therapist               Obj/Interventions     Row Name 06/11/22 0908          Range of Motion Comprehensive    General Range of Motion bilateral lower extremity ROM WNL  -BS     Comment, General Range of Motion R hand grossly WFL for AROM for all digits in splint  -BS     Row Name 06/11/22 0908          Strength Comprehensive (MMT)    General Manual Muscle Testing (MMT) Assessment no strength deficits identified  -BS     Comment, General Manual Muscle Testing (MMT) Assessment B LE's grossly 5/5  -BS     Row Name 06/11/22 0908          Balance    Balance Interventions standing;static;eyes closed during activity  -BS     Row Name 06/11/22 0908          Sensory Assessment (Somatosensory)    Sensory Assessment (Somatosensory) bilateral LE  -BS     Bilateral LE Sensory Assessment light touch awareness;intact  -BS           User Key  (r) = Recorded By, (t) = Taken By, (c) = Cosigned By    Initials Name Provider Type     BS Catrachito Edge, PT Physical Therapist               Goals/Plan     Row Name 06/11/22 0908          Transfer Goal 1 (PT)    Activity/Assistive Device (Transfer Goal 1, PT) sit-to-stand/stand-to-sit;bed-to-chair/chair-to-bed  -BS     [place] Level/Cues Needed (Transfer Goal 1, PT) modified independence  -BS     Time Frame (Transfer Goal 1, PT) by discharge  -BS     Progress/Outcome (Transfer Goal 1, PT) goal not met  -BS     Row Name 06/11/22 0908          Gait Training Goal 1 (PT)    Activity/Assistive Device (Gait Training Goal 1, PT) gait (walking locomotion);assistive device use;improve balance and speed;decrease fall risk;cane, straight  -BS     [place] Level (Gait Training Goal 1, PT) modified independence  -BS     Distance (Gait Training Goal 1, PT) 100' x 1  -BS     Time Frame (Gait Training Goal 1, PT) by discharge  -BS     Progress/Outcome (Gait Training Goal 1, PT) goal not met  -BS     Row Name 06/11/22 0908          Stairs Goal 1 (PT)    Activity/Assistive Device (Stairs Goal 1, PT) ascending stairs;descending stairs;cane, straight  -BS     [place] Level/Cues Needed (Stairs Goal 1, PT) standby assist  -BS     Number of Stairs (Stairs Goal 1, PT) 1  -BS     Time Frame (Stairs Goal 1, PT) by discharge  -BS     Row Name 06/11/22 0908          Problem Specific Goal 1 (PT)    Problem Specific Goal 1 (PT) improve Tinetti balance test score to >/=23/38 to reduce her fall risk  -BS     Time Frame (Problem Specific Goal 1, PT) by discharge  -BS     Progress/Outcome (Problem Specific Goal 1, PT) goal not met  -BS     Row Name 06/11/22 0908          Therapy Assessment/Plan (PT)    Planned Therapy Interventions (PT) balance training;gait training;bed mobility training;patient/family education;strengthening;stair training;stretching;transfer training;neuromuscular re-education  -BS           User Key  (r) = Recorded By, (t) = Taken By, (c) = Cosigned By    Initials Name Provider Type    MARY Edge  "Catrachito VANN, PT Physical Therapist               Clinical Impression     Row Name 06/11/22 0908          Pain    Pretreatment Pain Rating 3/10  -BS     Posttreatment Pain Rating 3/10  -BS     Pain Location - Side/Orientation Right  -BS     Pain Location - elbow  -BS     Pain Intervention(s) Medication (See MAR);Repositioned  -BS     Row Name 06/11/22 0908          Plan of Care Review    Plan of Care Reviewed With patient;other (see comments)  patient's niece  -BS     Outcome Evaluation PT evaluation completed as a co-evaluation with OT. The patient presented requiring supervision with bed mobility tasks (supine to sit and scooting), CGA going sit to stand with a cane (pt's own hurrycane). CGA gait training 5'x2, then 30'x 1 with a hurrycane, steady step through gait pattern. Scored 19/28 on the Tinetti balance test, demonstrating a moderate fall risk. Would benefit from skilled PT to address improving the patient balance and improved independence with functional transfers and gait training. Anticipate short acute care stay. Excellent caregiver support. No home health required at this time. Would benefit from outpatient PT services in the near future.  -BS     Row Name 06/11/22 0908          Therapy Assessment/Plan (PT)    Patient/Family Therapy Goals Statement (PT) \"to go home\"  -BS     Rehab Potential (PT) good, to achieve stated therapy goals  -BS     Criteria for Skilled Interventions Met (PT) yes;meets criteria;skilled treatment is necessary  -BS     Therapy Frequency (PT) daily  -BS     Predicted Duration of Therapy Intervention (PT) until goals are met  -BS     Row Name 06/11/22 0908          Vital Signs    Pre Systolic BP Rehab 147  -BS     Pre Treatment Diastolic BP 87  -BS     Post Systolic BP Rehab 157  -BS     Post Treatment Diastolic BP 82  -BS     Pretreatment Heart Rate (beats/min) 93  -BS     Posttreatment Heart Rate (beats/min) 95  -BS     Pre SpO2 (%) 96  -BS     O2 Delivery Pre Treatment supplemental " "O2  2L  -BS     Pre Patient Position Supine  -BS     Intra Patient Position Sitting  -BS     Post Patient Position Sitting  -BS     Row Name 06/11/22 0908          Positioning and Restraints    Pre-Treatment Position in bed  -BS     Post Treatment Position chair  -BS     In Chair reclined;sitting;encouraged to call for assist;with family/caregiver;LLE elevated;RLE elevated;RUE elevated  -BS           User Key  (r) = Recorded By, (t) = Taken By, (c) = Cosigned By    Initials Name Provider Type    Catrachito Luis, PT Physical Therapist               Outcome Measures     Row Name 06/11/22 0908          Tinetti Assessment    Tinetti Assessment yes  -BS     Sitting Balance 0  -BS     Arises 1  -BS     Attempts to Rise 2  -BS     Immediate Standing Balance (first 5 sec) 1  -BS     Standing Balance 2  -BS     Sternal Nudge (feet close together) 2  -BS     Eyes Closed (feet close together) 1  -BS     Turning 360 Degrees- Steps 0  -BS     Turning 360 Degrees- Steadiness 1  -BS     Sitting Down 1  -BS     Tinetti Balance Score 11  -BS     Gait Initiation (immediate after told \"go\") 1  -BS     Step Length- Right Swing 1  -BS     Step Length- Left Swing 1  -BS     Foot Clearance- Right Foot 1  -BS     Foot Clearance- Left Foot 1  -BS     Step Symmetry 1  -BS     Step Continuity 1  -BS     Path (excursion) 1  -BS     Trunk 0  -BS     Base of Support 0  -BS     Gait Score 8  -BS     Tinetti Total Score 19  -BS     Tinetti Assistive Device other (see comments)  used own hurrycane  -BS     Row Name 06/11/22 0908          Functional Assessment    Outcome Measure Options Tinetti  -BS           User Key  (r) = Recorded By, (t) = Taken By, (c) = Cosigned By    Initials Name Provider Type    Catrachito Luis, PT Physical Therapist                               PT Recommendation and Plan  Planned Therapy Interventions (PT): balance training, gait training, bed mobility training, patient/family education, strengthening, stair training, " stretching, transfer training, neuromuscular re-education  Plan of Care Reviewed With: patient, other (see comments) (patient's niece)  Outcome Evaluation: PT evaluation completed as a co-evaluation with OT. The patient presented requiring supervision with bed mobility tasks (supine to sit and scooting), CGA going sit to stand with a cane (pt's own hurrycane). CGA gait training 5'x2, then 30'x 1 with a hurrycane, steady step through gait pattern. Scored 19/28 on the Tinetti balance test, demonstrating a moderate fall risk. Would benefit from skilled PT to address improving the patient balance and improved independence with functional transfers and gait training. Anticipate short acute care stay. Excellent caregiver support. No home health required at this time. Would benefit from outpatient PT services in the near future.     Time Calculation:    PT Charges     Row Name 06/11/22 1137             Time Calculation    Start Time 0908  -BS      Stop Time 0947  -BS      Time Calculation (min) 39 min  -BS      PT Received On 06/11/22  -BS      PT Goal Re-Cert Due Date 06/24/22  -BS            User Key  (r) = Recorded By, (t) = Taken By, (c) = Cosigned By    Initials Name Provider Type    Catrachito Luis, PT Physical Therapist              Therapy Charges for Today     Code Description Service Date Service Provider Modifiers Qty    06893293059 HC PT EVAL MOD COMPLEXITY 3 6/11/2022 Catrachito Edge, PT GP 1          PT G-Codes  Outcome Measure Options: Tinetti  Tinetti Total Score: 19    Catrachito Edge PT  6/11/2022

## 2022-06-11 NOTE — PROGRESS NOTES
ORTHOPEDIC PROGRESS NOTE:    Name:  Prema Curtis  Date:    2022  Date of admission:  6/10/2022    Post op day:  1 Day Post-Op  Procedure:    Procedure(s) (LRB):  RIGHT ELBOW OPEN REDUCTION INTERNAL FIXATION (Right)    Subjective:  No new complaints.  She is not reporting pain at this time.  She is able to wiggle her fingers although her fingers are still numb.  No fevers or chills.    Vitals:     Vitals:    22 0333   BP: 157/70   Pulse: 96   Resp: 18   Temp: 97 °F (36.1 °C)   SpO2: 93%      Temp (24hrs), Av.5 °F (36.4 °C), Min:96 °F (35.6 °C), Max:98.4 °F (36.9 °C)      Exam:  Posterior splint is intact.  She can wiggle her fingers.  She has good capillary refill.  Calves are soft and nontender.    ASSESSMENT:  Active Hospital Problems    Diagnosis  POA   • Closed fracture of right olecranon process [S52.021A]  Yes     Added automatically from request for surgery [id number]     • Diabetic peripheral neuropathy (HCC) [E11.42]  Yes     Added automatically from request for surgery [id number]     • Closed fracture of upper end of right radius with routine healing [S52.101D]  Not Applicable   • Type 2 diabetes mellitus with complication, without long-term current use of insulin (HCC) [E11.8]  Yes   • Essential hypertension [I10]  Yes   • Morbid obesity, unspecified obesity type (HCC) [E66.01]  Yes         PLAN:  Continue to observe at this point.  Assess for pain control once the block wears off.  Work with mobility today and ensure that she is safe for discharge.  Anticipate discharge home today.  Follow-up in 10 to 14 days in the orthopedic clinic.    22 at 09:15 CDT by Cisco Davenport MD

## 2022-06-11 NOTE — PLAN OF CARE
Goal Outcome Evaluation:  Plan of Care Reviewed With: patient, other (see comments) (patient's niece)           Outcome Evaluation: PT evaluation completed as a co-evaluation with OT. The patient presented requiring supervision with bed mobility tasks (supine to sit and scooting), CGA going sit to stand with a cane (pt's own hurrycane). CGA gait training 5'x2, then 30'x 1 with a hurrycane, steady step through gait pattern. Scored 19/28 on the Tinetti balance test, demonstrating a moderate fall risk. Would benefit from skilled PT to address improving the patient balance and improved independence with functional transfers and gait training. Anticipate short acute care stay. Excellent caregiver support. No home health required at this time. Would benefit from outpatient PT services in the near future.

## 2022-06-11 NOTE — THERAPY EVALUATION
Acute Care - Occupational Therapy Initial Evaluation  [facility]     Patient Name: Prema Curtis  : 1960  MRN: [id number]  Today's Date: 2022  Onset of Illness/Injury or Date of Surgery: 06/10/22  Date of Referral to OT: 22  Referring Physician: Issac Romero MD    Admit Date: 6/10/2022       ICD-10-CM ICD-9-CM   1. Essential hypertension  I10 401.9   2. Diabetic peripheral neuropathy (HCC)  E11.42 250.60     357.2   3. Morbid obesity with BMI of 40.0-44.9, adult ([facility])  E66.01 278.01    Z68.41 V85.41   4. Closed fracture of proximal end of right radius with routine healing, unspecified fracture morphology, subsequent encounter  S52.101D V54.12   5. Type 2 diabetes mellitus with complication, without long-term current use of insulin ([facility])  E11.8 250.90   6. Closed fracture of olecranon process of right ulna with routine healing, subsequent encounter  S52.021D V54.12   7. Impaired functional mobility, balance, gait, and endurance  Z74.09 V49.89   8. Impaired mobility and activities of daily living  Z74.09 V49.89    Z78.9      Patient Active Problem List   Diagnosis   • Allergic reaction   • Vitamin B12 deficiency (non anemic)   • Tinea corporis   • Tenderness over spine   • Superficial injury of breast without infection   • Skin rash   • Psoriasis of scalp   • Plantar fasciitis   • Morbid obesity, unspecified obesity type (HCC)   • Myalgia   • Knee pain   • Hypertensive disorder   • Hidradenitis suppurativa   • H/O mammogram   • VETO (generalized anxiety disorder)   • Foot pain   • Fatigue   • Essential hypertension   • Enlarged lymph nodes   • Drug therapy   • Cutaneous abscess   • Community acquired pneumonia   • Anxious mood   • Ankle joint pain   • Chronic pain of right knee   • Multiple joint pain   • Presence of right artificial knee joint   • Bilateral low back pain without sciatica   • Type 2 diabetes mellitus with complication, without long-term current use of insulin (HCC)   • Right  "elbow pain   • Closed fracture of upper end of right radius with routine healing   • Closed fracture of right olecranon process   • Diabetic peripheral neuropathy (HCC)     Past Medical History:   Diagnosis Date   • Ankle joint pain    • Anxious mood    • Arthritis    • Cellulitis and abscess     L Axilla   • Chronic pain of right knee 11/01/2016   • Community acquired pneumonia    • Cutaneous abscess     of limb   • Development delay     niece states pt is \"slow\" and lives with brother who is guardian   • Diabetes mellitus (HCC)    • Disease of thyroid gland    • Drug therapy     long-term   • Enlarged lymph nodes     L Axilla   • Essential hypertension    • Fatigue    • Foot pain    • VETO (generalized anxiety disorder)    • H/O mammogram 02/12/2015   • Hidradenitis suppurativa    • Knee pain    • Myalgia    • Obesity    • Plantar fasciitis    • Psoriasis of scalp    • Skin rash    • Superficial injury of breast without infection    • Tenderness over spine     lumbar region   • Tinea corporis    • Vitamin B12 deficiency (non anemic)      Past Surgical History:   Procedure Laterality Date   • BREAST BIOPSY Left     Negative   • TOTAL KNEE ARTHROPLASTY Right 11/11/2013         OT ASSESSMENT FLOWSHEET (last 12 hours)     OT Evaluation and Treatment     Row Name 06/11/22 0900                   OT Time and Intention    Subjective Information complains of;pain  -RB        Document Type evaluation  -RB        Mode of Treatment co-treatment;physical therapy;occupational therapy  -RB        Patient Effort good  -RB                  General Information    Patient Profile Reviewed yes  -RB        Onset of Illness/Injury or Date of Surgery 06/10/22  -RB        Referring Physician Issac Romero MD  -RB        Patient/Family/Caregiver Comments/Observations Niece in room.  -RB        Prior Level of Function independent:;gait;transfer;ADL's  -RB        Equipment Currently Used at Home cane, straight  -RB        Existing " Precautions/Restrictions fall;weight bearing  Non weight bearing on R UE.  -RB        Equipment Issued to Patient gait belt  -RB        Risks Reviewed patient:;LOB  -RB                  Living Environment    Current Living Arrangements home  -RB        Home Accessibility stairs to enter home  -RB        People in Home sibling(s)  -RB        Primary Care Provided by other (see comments)  Brother who lives with pt.  -RB                  Home Main Entrance    Number of Stairs, Main Entrance one  -RB        Stair Railings, Main Entrance none  -RB                  Stairs Within Home, Primary    Stairs, Within Home, Primary 1  -RB                  Home Use of Assistive/Adaptive Equipment    Equipment Currently Used at Home grab bar;cane, straight  Has S/W, streight cane and quad cane.  -RB                  Pain Assessment    Pretreatment Pain Rating 3/10  -RB        Posttreatment Pain Rating 3/10  -RB        Pain Location - Side/Orientation Right  -RB        Pain Location - elbow  -RB        Pain Intervention(s) Medication (See MAR)  -RB                  Cognition    Affect/Mental Status (Cognition) WNL  -RB        Orientation Status (Cognition) oriented x 4  -RB                  Range of Motion Comprehensive    General Range of Motion upper extremity range of motion deficits identified  -RB        Comment, General Range of Motion R digit motion as tolerated.  Rest of R UE casted and in sling when up.   -RB                  General Upper Extremity Assessment (Range of Motion)    Upper Extremity: Range of Motion LUE ROM WNL  -RB        Comment: Upper Extremity ROM R splint on when out of bed.  -RB                  Strength Comprehensive (MMT)    General Manual Muscle Testing (MMT) Assessment other (see comments)  -RB        Comment, General Manual Muscle Testing (MMT) Assessment L UE strength was 4+/5 grossly.  -RB                  Activities of Daily Living    BADL Assessment/Intervention lower body dressing  -RB                   Lower Body Dressing Assessment/Training    [place] Level (Lower Body Dressing) lower body dressing skills;doff;socks;maximum assist (25% patient effort)  -RB        Position (Lower Body Dressing) edge of bed sitting  -RB                  Bed Mobility    Bed Mobility supine-sit  -RB        Supine-Sit [place] (Bed Mobility) supervision  -RB        Assistive Device (Bed Mobility) head of bed elevated;bed rails  -RB                  Functional Mobility    Functional Mobility- Ind. Level contact guard assist  -RB        Functional Mobility- Device cane, straight  -RB        Functional Mobility-Distance (Feet) 40  -RB        Functional Mobility- Safety Issues balance decreased during turns;step length decreased  -RB                  Transfer Assessment/Treatment    Transfers sit-stand transfer;stand-sit transfer;toilet transfer  -RB                  Transfers    Sit-Stand [place] (Transfers) contact guard  -RB        Stand-Sit [place] (Transfers) contact guard  -RB        [place] Level (Toilet Transfer) contact guard  -RB                  Sit-Stand Transfer    Assistive Device (Sit-Stand Transfers) cane, straight  -RB                  Stand-Sit Transfer    Assistive Device (Stand-Sit Transfers) cane, straight  -RB                  Toilet Transfer    Type (Toilet Transfer) sit-stand;stand-sit  -RB                  Wound 06/10/22 1748 Right posterior elbow Incision    Wound - Properties Group Placement Date: 06/10/22  -SW Placement Time: 1748 -SW Side: Right  -SW Orientation: posterior  -SW Location: elbow  -SW Primary Wound Type: Incision  -SW        Retired Wound - Properties Group Placement Date: 06/10/22  -SW Placement Time: 1748 -SW Side: Right  -SW Orientation: posterior  -SW Location: elbow  -SW Primary Wound Type: Incision  -SW        Retired Wound - Properties Group Date first assessed: 06/10/22  -SW Time first assessed: 1748 -SW Side: Right  -SW Location: elbow  -SW Primary  Wound Type: Incision  -SW                  Plan of Care Review    Plan of Care Reviewed With patient  -RB                  Vital Signs    Pre Systolic BP Rehab 147  -RB        Pre Treatment Diastolic BP 87  -RB        Post Systolic BP Rehab 157  -RB        Post Treatment Diastolic BP 82  -RB        Pretreatment Heart Rate (beats/min) 93  -RB        Posttreatment Heart Rate (beats/min) 95  -RB        Pre SpO2 (%) 96  -RB        O2 Delivery Pre Treatment supplemental O2  2 L  -RB        Post SpO2 (%) 95  -RB        O2 Delivery Post Treatment trach collar  -RB        Pre Patient Position Supine  -RB        Intra Patient Position Standing  -RB        Post Patient Position Sitting  -RB                  Positioning and Restraints    Pre-Treatment Position in bed  -RB        Post Treatment Position chair  -RB        In Chair sitting;encouraged to call for assist;with family/caregiver  -RB                  Therapy Assessment/Plan (OT)    Date of Referral to OT 06/11/22  -RB        Functional Level at Time of Evaluation (OT) Imp mob and ADLs.  -RB        OT Diagnosis Imp mob and ADLs.  -RB        Rehab Potential (OT) good, to achieve stated therapy goals  -RB        Criteria for Skilled Therapeutic Interventions Met (OT) yes;meets criteria  -RB        Therapy Frequency (OT) daily   -RB        Predicted Duration of Therapy Intervention (OT) Until D/C.  -RB        Problem List (OT) problems related to;balance;coordination;mobility;strength;inability to direct their own care  -RB        Activity Limitations Related to Problem List (OT) unable to ambulate safely;unable to transfer safely;BADLs not performed adequately or safely;IADLs not performed adequately or safely  -RB        Planned Therapy Interventions (OT) activity tolerance training;adaptive equipment training;BADL retraining;functional balance retraining;occupation/activity based interventions;patient/caregiver education/training;ROM/therapeutic exercise;strengthening  exercise;transfer/mobility retraining  -RB                  Evaluation Complexity (OT)    Review Occupational Profile/Medical/Therapy History Complexity expanded/moderate complexity  -RB        Assessment, Occupational Performance/Identification of Deficit Complexity 3-5 performance deficits  -RB        Clinical Decision Making Complexity (OT) detailed assessment/moderate complexity  -RB        Overall Complexity of Evaluation (OT) moderate complexity  -RB                  Therapy Plan Review/Discharge Plan (OT)    Anticipated Discharge Disposition (OT) home with 24/7 care;home with home health;home with assist  -RB                  OT Goals    Transfer Goal Selection (OT) transfer, OT goal 1  -RB        Bathing Goal Selection (OT) bathing, OT goal 1  -RB        Dressing Goal Selection (OT) dressing, OT goal 1  -RB        Toileting Goal Selection (OT) toileting, OT goal 1  -RB                  Transfer Goal 1 (OT)    Activity/Assistive Device (Transfer Goal 1, OT) transfers, all  -RB        [place] Level/Cues Needed (Transfer Goal 1, OT) modified independence  -RB        Time Frame (Transfer Goal 1, OT) long term goal (LTG)  -RB        Progress/Outcome (Transfer Goal 1, OT) goal not met  -RB                  Bathing Goal 1 (OT)    Activity/Device (Bathing Goal 1, OT) bathing skills, all  -RB        [place] Level/Cues Needed (Bathing Goal 1, OT) contact guard required  -RB        Time Frame (Bathing Goal 1, OT) long term goal (LTG)  -RB        Progress/Outcomes (Bathing Goal 1, OT) goal not met  -RB                  Dressing Goal 1 (OT)    Activity/Device (Dressing Goal 1, OT) dressing skills, all  -RB        [place]/Cues Needed (Dressing Goal 1, OT) contact guard required  -RB        Time Frame (Dressing Goal 1, OT) long term goal (LTG)  -RB        Progress/Outcome (Dressing Goal 1, OT) goal not met  -RB                  Toileting Goal 1 (OT)    Activity/Device (Toileting Goal 1, OT) toileting skills,  all  -RB        [place] Level/Cues Needed (Toileting Goal 1, OT) contact guard required  -RB        Time Frame (Toileting Goal 1, OT) long term goal (LTG)  -RB        Progress/Outcome (Toileting Goal 1, OT) goal not met  -RB              User Key  (r) = Recorded By, (t) = Taken By, (c) = Cosigned By    Initials Name Effective Dates    RB Kyler Cardenas OT 06/16/21 -     Kiera Garcia RN 01/19/22 -                  Occupational Therapy Education                 Title: PT OT SLP Therapies (In Progress)     Topic: Occupational Therapy (In Progress)     Point: ADL training (Not Started)     Description:   Instruct learner(s) on proper safety adaptation and remediation techniques during self care or transfers.   Instruct in proper use of assistive devices.              Learner Progress:  Not documented in this visit.          Point: Home exercise program (Not Started)     Description:   Instruct learner(s) on appropriate technique for monitoring, assisting and/or progressing therapeutic exercises/activities.              Learner Progress:  Not documented in this visit.          Point: Precautions (Done)     Description:   Instruct learner(s) on prescribed precautions during self-care and functional transfers.              Learning Progress Summary           Patient Acceptance, E, VU by RB at 6/11/2022 1328    Comment: Edu pt on use of gait belt and non skid socks when OOB and no OOB without assist.                   Point: Body mechanics (Not Started)     Description:   Instruct learner(s) on proper positioning and spine alignment during self-care, functional mobility activities and/or exercises.              Learner Progress:  Not documented in this visit.                      User Key     Initials Effective Dates Name Provider Type Discipline     06/16/21 -  Kyler Cardenas OT Occupational Therapist OT                  OT Recommendation and Plan  Planned Therapy Interventions (OT): activity tolerance  training, adaptive equipment training, BADL retraining, functional balance retraining, occupation/activity based interventions, patient/caregiver education/training, ROM/therapeutic exercise, strengthening exercise, transfer/mobility retraining  Therapy Frequency (OT): (S) daily  Plan of Care Review  Plan of Care Reviewed With: patient  Outcome Evaluation: OT eval on this date as co-eval with PT.  Supervision for bed mobility.  CGA required for ~40' ambulation with streight cane.  Sit to stand and toilet transfer with with CGA.  Mod A needed for LB dressing with sock.  Pt could benefit from OT services to increase safety and independence with ADLs and functional mobility.  She could also benefit from OP OT at later date.  Plan of Care Reviewed With: patient  Outcome Evaluation: OT eval on this date as co-eval with PT.  Supervision for bed mobility.  CGA required for ~40' ambulation with streight cane.  Sit to stand and toilet transfer with with CGA.  Mod A needed for LB dressing with sock.  Pt could benefit from OT services to increase safety and independence with ADLs and functional mobility.  She could also benefit from OP OT at later date.     Outcome Measures     Row Name 06/11/22 0900             How much help from another is currently needed...    Putting on and taking off regular lower body clothing? 2  -RB      Bathing (including washing, rinsing, and drying) 2  -RB      Toileting (which includes using toilet bed pan or urinal) 2  -RB      Putting on and taking off regular upper body clothing 2  -RB      Taking care of personal grooming (such as brushing teeth) 2  -RB      Eating meals 3  -RB      AM-PAC 6 Clicks Score (OT) 13  -RB              Functional Assessment    Outcome Measure Options AM-PAC 6 Clicks Daily Activity (OT)  -RB            User Key  (r) = Recorded By, (t) = Taken By, (c) = Cosigned By    Initials Name Provider Type    RB Kyler Cardenas, OT Occupational Therapist                Time  Calculation:    Time Calculation- OT     Row Name 06/11/22 1334             Time Calculation- OT    OT Start Time 0900  -RB      OT Stop Time 0947  -RB      OT Time Calculation (min) 47 min  -RB      OT Received On 06/11/22  -RB      OT Goal Re-Cert Due Date 06/24/22  -RB            User Key  (r) = Recorded By, (t) = Taken By, (c) = Cosigned By    Initials Name Provider Type    RB Kyler Cardenas OT Occupational Therapist              Therapy Charges for Today     Code Description Service Date Service Provider Modifiers Qty    [id number]  OT EVAL MOD COMPLEXITY 3 6/11/2022 Kyler Cardenas OT GO 1               Kyler Cardenas OT  6/11/2022

## 2022-06-11 NOTE — PLAN OF CARE
Goal Outcome Evaluation:  Plan of Care Reviewed With: patient           Outcome Evaluation: OT eval on this date as co-eval with PT.  Supervision for bed mobility.  CGA required for ~40' ambulation with streight cane.  Sit to stand and toilet transfer with with CGA.  Mod A needed for LB dressing with sock.  Pt could benefit from OT services to increase safety and independence with ADLs and functional mobility.  She could also benefit from OP OT at later date.

## 2022-06-15 DIAGNOSIS — M54.9 MID BACK PAIN: ICD-10-CM

## 2022-06-15 RX ORDER — HYDROCODONE BITARTRATE AND ACETAMINOPHEN 10; 325 MG/1; MG/1
1 TABLET ORAL EVERY 6 HOURS PRN
Qty: 120 TABLET | Refills: 0 | Status: SHIPPED | OUTPATIENT
Start: 2022-06-15 | End: 2022-07-12 | Stop reason: SDUPTHER

## 2022-06-17 NOTE — DISCHARGE SUMMARY
Patient Name: Prema Curtis  Patient YOB: 1960    Date of Admission:  6/10/2022  Date of Discharge:  6/11/2022  Discharge Diagnosis:   Essential hypertension    Diabetic peripheral neuropathy (HCC)    Morbid obesity with BMI of 40.0-44.9, adult (HCC)    Closed fracture of proximal end of right radius with routine healing, unspecified fracture morphology, subsequent encounter    Type 2 diabetes mellitus with complication, without long-term current use of insulin (HCC)    Closed fracture of olecranon process of right ulna with routine healing, subsequent encounter    Impaired functional mobility, balance, gait, and endurance    Impaired mobility and activities of daily living    Presenting Problem/History of Present Illness: Closed fracture of proximal end of right radius with routine healing, unspecified fracture morphology, subsequent encounter [S52.101D]  Closed fracture of olecranon process of right ulna with routine healing, subsequent encounter [S52.021D]  Type 2 diabetes mellitus with complication, without long-term current use of insulin (HCC) [E11.8]  Essential hypertension [I10]  Morbid obesity with BMI of 40.0-44.9, adult (HCC) [E66.01, Z68.41]  Diabetic peripheral neuropathy (HCC) [E11.42]    Procedure:  Procedure(s) (LRB):  RIGHT ELBOW OPEN REDUCTION INTERNAL FIXATION (Right)    Admitting Physician:  Cisco Davenport MD    Consults:   Consults     No orders found from 5/12/2022 to 6/11/2022.          DETAILS OF HOSPITAL STAY:  Patient is a 62 y.o. female was admitted to the floor following the above procedure and underwent an uncomplicated hospital stay.  Patient did well with physical therapy and was ambulating well with minimal assistance.  Patient was tolerating diet without problems.  Patient was discharged home.    Condition on Discharge:  Stable      LABS:      Admission on 06/10/2022, Discharged on 06/11/2022   Component Date Value Ref Range Status   • QT Interval  06/10/2022 370  ms Final   • QTC Interval 06/10/2022 442  ms Final   • Glucose 06/10/2022 147 (A) 65 - 99 mg/dL Final   • BUN 06/10/2022 13  8 - 23 mg/dL Final   • Creatinine 06/10/2022 0.75  0.57 - 1.00 mg/dL Final   • Sodium 06/10/2022 139  136 - 145 mmol/L Final   • Potassium 06/10/2022 4.0  3.5 - 5.2 mmol/L Final   • Chloride 06/10/2022 102  98 - 107 mmol/L Final   • CO2 06/10/2022 26.0  22.0 - 29.0 mmol/L Final   • Calcium 06/10/2022 9.6  8.6 - 10.5 mg/dL Final   • BUN/Creatinine Ratio 06/10/2022 17.3  7.0 - 25.0 Final   • Anion Gap 06/10/2022 11.0  5.0 - 15.0 mmol/L Final   • eGFR 06/10/2022 89.6  >60.0 mL/min/1.73 Final    National Kidney Foundation and American Society of Nephrology (ASN) Task Force recommended calculation based on the Chronic Kidney Disease Epidemiology Collaboration (CKD-EPI) equation refit without adjustment for race.   • MRSA, PCR 06/10/2022 Negative  Negative Final   • COVID19 06/10/2022 Not Detected  Not Detected - Ref. Range Final   • Glucose 06/10/2022 160 (A) 70 - 130 mg/dL Final    Notify DoctorOperator: [id number] SULEIMAN CALLAWAYLEYMeter ID: [id number]   • Glucose 06/10/2022 159 (A) 70 - 130 mg/dL Final    RN NotifiedOperator: [id number] CASSANDRA SMALLSIMIEMeter ID: [id number]   • Glucose 06/11/2022 124  70 - 130 mg/dL Final    RN NotifiedOperator: [id number] COREY LE'ANNAMeter ID: [id number]   • Glucose 06/11/2022 139 (A) 70 - 130 mg/dL Final    RN NotifiedOperator: [id number] COREY LE'ANNAMeter ID: [id number]       XR Elbow 3+ View Right    Result Date: 6/9/2022  Narrative: Three view right elbow HISTORY: Pain. Fracture follow-up. AP, lateral and oblique views obtained. COMPARISON: May 16, 2022 FINDINGS: Healing mildly displaced transverse fracture radial neck. Minimally displaced and diastatic fracture olecranon process. Small spur coronoid process proximal ulna. No dislocation. No other osseous or articular abnormality.     Impression: CONCLUSION: Healing mildly displaced  transverse fracture radial neck. Minimally displaced and diastatic fracture olecranon process. Small spur coronoid process proximal ulna. [id number] Electronically signed by:  Noam Hall MD  6/9/2022 4:42 PM CDT Workstation: [id number]    Peripheral Block    Result Date: 6/10/2022  Narrative: Emerson Kyler BRITNI Gomez     6/10/2022  5:03 PM Peripheral Block Patient location during procedure: OR Start time: 6/10/2022 4:20 PM Stop time: 6/10/2022 4:39 PM Reason for block: procedure for pain and post-op pain management Preanesthetic Checklist Completed: patient identified, IV checked, site marked, risks and benefits discussed, surgical consent, monitors and equipment checked, pre-op evaluation and timeout performed Prep: Pt Position: supine Sterile barriers:cap, gloves, mask and washed/disinfected hands Prep: ChloraPrep Patient monitoring: blood pressure monitoring, continuous pulse oximetry and EKG Procedure Sedation: yes Performed under: PNB Guidance:ultrasound guided and landmark technique ULTRASOUND INTERPRETATION. Using ultrasound guidance a 21 G gauge needle was placed in close proximity to the nerve, at which point, under ultrasound guidance anesthetic was injected in the area of the nerve and spread of the anesthesia was seen on ultrasound in close proximity thereto.  There were no abnormalities seen on ultrasound; a digital image was taken; and the patient tolerated the procedure with no complications. Images:still images obtained, printed/placed on chart Laterality:right Block Type:supraclavicular Injection Technique:single-shot Needle Type:echogenic Needle Gauge:22 G Resistance on Injection: none Medications Used: ropivacaine (NAROPIN) 0.5 % injection, 20 mL Med administered at 6/10/2022 4:34 PM Medications Comment:Needle visualized throughout. Pt tolerated well. Post Assessment Injection Assessment: negative aspiration for heme, no paresthesia on injection and incremental injection Patient Tolerance:comfortable  throughout block Complications:no       Discharge Medications     Discharge Medications      Changes to Medications      Instructions Start Date   mupirocin 2 % ointment  Commonly known as: Bactroban  What changed: how much to take   Topical, 3 Times Daily         Continue These Medications      Instructions Start Date   albuterol sulfate  (90 Base) MCG/ACT inhaler  Commonly known as: PROVENTIL HFA;VENTOLIN HFA;PROAIR HFA   2 puffs, Inhalation, Every 4 Hours PRN      azelastine 0.1 % nasal spray  Commonly known as: ASTELIN   2 sprays, 2 Times Daily      Contour Next Test test strip  Generic drug: glucose blood   Test FSBS TID and prn due to labile blood sugars      Easy Touch Lancets 28G/Twist misc   Use as directed at least once daily and prn      empagliflozin 25 MG tablet tablet  Commonly known as: JARDIANCE   25 mg, Oral, Daily      ibuprofen 800 MG tablet  Commonly known as: ADVIL,MOTRIN   800 mg, Oral, 3 Times Daily PRN      levothyroxine 25 MCG tablet  Commonly known as: SYNTHROID, LEVOTHROID   25 mcg, Oral, Daily      lisinopril 10 MG tablet  Commonly known as: PRINIVIL,ZESTRIL   10 mg, Oral, Daily      montelukast 10 MG tablet  Commonly known as: SINGULAIR   10 mg, Oral, Nightly      nystatin 171139 UNIT/GM powder  Commonly known as: MYCOSTATIN   1 application, Topical, 4 Times Daily      promethazine 25 MG tablet  Commonly known as: PHENERGAN   25 mg, Oral, Every 6 Hours PRN      rosuvastatin 5 MG tablet  Commonly known as: CRESTOR   5 mg, Oral, Daily      triamcinolone 0.1 % cream  Commonly known as: KENALOG   Topical, 2 Times Daily             Discharge Diet:   Diet Instructions     Diet:      Diet Texture / Consistency: Regular          Activity at Discharge:   Activity Instructions     Patient May Shower; No Tub Baths, Pools or Hot Tubs      Splint must remain clean and dry    Number of Days: 2          Discharge Instructions: Patient is to continue with physical therapy exercises daily and  continue working with the physical therapist as ordered.  She was going to continue use of the splint until her office visit.  She is going to continue with elevation and use of the sling.  Mobilization as tolerated.  We discussed being careful with activity to avoid further injury to the arm.  Finger motion as tolerated.  She was to call the office with any problems.  Follow-up in 2 weeks.        Discharge Diagnosis:    Patient Active Problem List   Diagnosis   • Allergic reaction   • Vitamin B12 deficiency (non anemic)   • Tinea corporis   • Tenderness over spine   • Superficial injury of breast without infection   • Skin rash   • Psoriasis of scalp   • Plantar fasciitis   • Morbid obesity, unspecified obesity type (HCC)   • Myalgia   • Knee pain   • Hypertensive disorder   • Hidradenitis suppurativa   • H/O mammogram   • VETO (generalized anxiety disorder)   • Foot pain   • Fatigue   • Essential hypertension   • Enlarged lymph nodes   • Drug therapy   • Cutaneous abscess   • Community acquired pneumonia   • Anxious mood   • Ankle joint pain   • Chronic pain of right knee   • Multiple joint pain   • Presence of right artificial knee joint   • Bilateral low back pain without sciatica   • Type 2 diabetes mellitus with complication, without long-term current use of insulin ([facility])   • Right elbow pain   • Closed fracture of upper end of right radius with routine healing   • Closed fracture of right olecranon process   • Diabetic peripheral neuropathy ([facility])       Follow-up Appointments  Future Appointments   Date Time Provider Department Center   6/27/2022  3:00 PM Cindy Watts APRN [facility]   8/16/2022  1:00 PM Olga Rojas MD [facility]     Additional Instructions for the Follow-ups that You Need to Schedule     Apply Ice to Affected Extremity Every 2 Hours   As directed      Discharge Follow-up with Specified Provider: CIRA Patrick, or JENNIFER Bazan in 10-14 days   As directed       To: CIRA Patrick, or JENNIFER Bazan in 10-14 days         Elevate Affected Extremity At or Above Level of Heart   As directed               Cisco Davenport MD  06/16/22  23:04 CDT

## 2022-06-23 DIAGNOSIS — S52.101D CLOSED FRACTURE OF PROXIMAL END OF RIGHT RADIUS WITH ROUTINE HEALING, UNSPECIFIED FRACTURE MORPHOLOGY, SUBSEQUENT ENCOUNTER: Primary | ICD-10-CM

## 2022-06-27 ENCOUNTER — OFFICE VISIT (OUTPATIENT)
Dept: ORTHOPEDIC SURGERY | Facility: CLINIC | Age: 62
End: 2022-06-27

## 2022-06-27 VITALS — HEIGHT: 60 IN | BODY MASS INDEX: 44.11 KG/M2 | WEIGHT: 224.7 LBS

## 2022-06-27 DIAGNOSIS — S52.101D CLOSED FRACTURE OF PROXIMAL END OF RIGHT RADIUS WITH ROUTINE HEALING, UNSPECIFIED FRACTURE MORPHOLOGY, SUBSEQUENT ENCOUNTER: ICD-10-CM

## 2022-06-27 DIAGNOSIS — Z98.890 S/P ORIF (OPEN REDUCTION INTERNAL FIXATION) FRACTURE: Primary | ICD-10-CM

## 2022-06-27 DIAGNOSIS — M25.521 RIGHT ELBOW PAIN: ICD-10-CM

## 2022-06-27 DIAGNOSIS — S52.021D CLOSED FRACTURE OF OLECRANON PROCESS OF RIGHT ULNA WITH ROUTINE HEALING, SUBSEQUENT ENCOUNTER: ICD-10-CM

## 2022-06-27 DIAGNOSIS — Z87.81 S/P ORIF (OPEN REDUCTION INTERNAL FIXATION) FRACTURE: Primary | ICD-10-CM

## 2022-06-27 PROCEDURE — 99024 POSTOP FOLLOW-UP VISIT: CPT | Performed by: NURSE PRACTITIONER

## 2022-06-27 NOTE — PROGRESS NOTES
"Prema Curtis is a 62 y.o. female is s/p  Right Elbow Open Reduction Internal Fixation      Chief Complaint   Patient presents with   • Right Elbow - Follow-up       HISTORY OF PRESENT ILLNESS: This 62-year-old female patient presents today for 2-week follow-up status post right elbow ORIF on 6/10/2022 by Dr. Davenport.  This patient has no complaints this visit.  The patient is progressing appropriately.  The patient reports she has been wearing the splint the entire time has not removed it since surgery.  The patient reports some tingling denies numbness.  Denies signs and symptoms of infection.  Sent for x-ray upon arrival.       Allergies   Allergen Reactions   • Apple Other (See Comments)     Alpha gal   • Beef-Derived Products Other (See Comments)     Alpha gal   • Milk-Related Compounds Other (See Comments)     Alpha gal   • Onion Other (See Comments)     Alpha gal   • Penicillins Other (See Comments)     \"dont know\"   • Pork-Derived Products Other (See Comments)     Alpha gal   • Ragwitek [Short Ragweed Pollen Ext] Other (See Comments)     Nasal congestion   • Shellfish-Derived Products Other (See Comments)     Alpha gal   • Strawberry Extract Other (See Comments)     Alpha gal   • Sulfa Antibiotics Other (See Comments)     \"dont know\"   • Tomato Other (See Comments)     Alpha gal   • Latex Rash         Current Outpatient Medications:   •  albuterol sulfate  (90 Base) MCG/ACT inhaler, Inhale 2 puffs Every 4 (Four) Hours As Needed for Wheezing., Disp: , Rfl:   •  azelastine (ASTELIN) 0.1 % nasal spray, 2 sprays 2 (Two) Times a Day., Disp: , Rfl: 11  •  Easy Touch Lancets 28G/Twist misc, Use as directed at least once daily and prn, Disp: 100 each, Rfl: 5  •  empagliflozin (JARDIANCE) 25 MG tablet tablet, Take 25 mg by mouth Daily., Disp: , Rfl:   •  glucose blood (Contour Next Test) test strip, Test FSBS TID and prn due to labile blood sugars, Disp: 100 each, Rfl: 5  •  HYDROcodone-acetaminophen " (Norco)  MG per tablet, Take 1 tablet by mouth Every 6 (Six) Hours As Needed for Moderate Pain ., Disp: 120 tablet, Rfl: 0  •  ibuprofen (ADVIL,MOTRIN) 800 MG tablet, Take 1 tablet by mouth 3 (Three) Times a Day As Needed for Mild Pain ., Disp: 90 tablet, Rfl: 3  •  levothyroxine (SYNTHROID, LEVOTHROID) 25 MCG tablet, Take 25 mcg by mouth Daily., Disp: , Rfl:   •  lisinopril (PRINIVIL,ZESTRIL) 10 MG tablet, Take 1 tablet by mouth Daily., Disp: 30 tablet, Rfl: 5  •  montelukast (SINGULAIR) 10 MG tablet, Take 10 mg by mouth Every Night., Disp: , Rfl:   •  mupirocin (Bactroban) 2 % ointment, Apply  topically to the appropriate area as directed 3 (Three) Times a Day. (Patient taking differently: Apply 1 application topically to the appropriate area as directed 3 (Three) Times a Day.), Disp: 30 g, Rfl: 5  •  nystatin (MYCOSTATIN) 824259 UNIT/GM powder, Apply 1 application topically to the appropriate area as directed 4 (Four) Times a Day., Disp: , Rfl:   •  promethazine (PHENERGAN) 25 MG tablet, Take 1 tablet by mouth Every 6 (Six) Hours As Needed for Nausea or Vomiting., Disp: 30 tablet, Rfl: 2  •  rosuvastatin (CRESTOR) 5 MG tablet, Take 5 mg by mouth Daily., Disp: , Rfl:   •  triamcinolone (KENALOG) 0.1 % cream, Apply  topically to the appropriate area as directed 2 (Two) Times a Day., Disp: 80 g, Rfl: 5    No fevers or chills.  No nausea or vomiting.      PHYSICAL EXAMINATION:       Prema Curtis is a 62 y.o. female    Patient is awake and alert, answers questions appropriately and is in no apparent distress.    GAIT:     []  Normal  []  Antalgic    Assistive device: []  None  []  Walker     []  Crutches  []  Cane     []  Wheelchair  []  Stretcher    Right Elbow Exam     Comments:  Deferred range of motion due to known fracture and repair.  Surgical incision healing appropriately.  Well approximated.  Surrounding skin warm, dry and intact.  No signs of infection.  Good wrist and finger mobility.  Good  radial pulse.  Good finger mobility.                XR Elbow 3+ View Right    Result Date: 6/9/2022  Narrative: Three view right elbow HISTORY: Pain. Fracture follow-up. AP, lateral and oblique views obtained. COMPARISON: May 16, 2022 FINDINGS: Healing mildly displaced transverse fracture radial neck. Minimally displaced and diastatic fracture olecranon process. Small spur coronoid process proximal ulna. No dislocation. No other osseous or articular abnormality.     Impression: CONCLUSION: Healing mildly displaced transverse fracture radial neck. Minimally displaced and diastatic fracture olecranon process. Small spur coronoid process proximal ulna. [id number] Electronically signed by:  Noam Hall MD  6/9/2022 4:42 PM CDT Workstation: [id number]    Peripheral Block    Result Date: 6/10/2022  Narrative: Kyler Norman CRNA     6/10/2022  5:03 PM Peripheral Block Patient location during procedure: OR Start time: 6/10/2022 4:20 PM Stop time: 6/10/2022 4:39 PM Reason for block: procedure for pain and post-op pain management Preanesthetic Checklist Completed: patient identified, IV checked, site marked, risks and benefits discussed, surgical consent, monitors and equipment checked, pre-op evaluation and timeout performed Prep: Pt Position: supine Sterile barriers:cap, gloves, mask and washed/disinfected hands Prep: ChloraPrep Patient monitoring: blood pressure monitoring, continuous pulse oximetry and EKG Procedure Sedation: yes Performed under: PNB Guidance:ultrasound guided and landmark technique ULTRASOUND INTERPRETATION. Using ultrasound guidance a 21 G gauge needle was placed in close proximity to the nerve, at which point, under ultrasound guidance anesthetic was injected in the area of the nerve and spread of the anesthesia was seen on ultrasound in close proximity thereto.  There were no abnormalities seen on ultrasound; a digital image was taken; and the patient tolerated the procedure with no complications.  Images:still images obtained, printed/placed on chart Laterality:right Block Type:supraclavicular Injection Technique:single-shot Needle Type:echogenic Needle Gauge:22 G Resistance on Injection: none Medications Used: ropivacaine (NAROPIN) 0.5 % injection, 20 mL Med administered at 6/10/2022 4:34 PM Medications Comment:Needle visualized throughout. Pt tolerated well. Post Assessment Injection Assessment: negative aspiration for heme, no paresthesia on injection and incremental injection Patient Tolerance:comfortable throughout block Complications:no           ASSESSMENT:    Diagnoses and all orders for this visit:    S/P ORIF (open reduction internal fixation) fracture    Closed fracture of proximal end of right radius with routine healing, unspecified fracture morphology, subsequent encounter  -     Ambulatory Referral to Physical Therapy    Closed fracture of olecranon process of right ulna with routine healing, subsequent encounter  -     Ambulatory Referral to Physical Therapy    Right elbow pain  -     Ambulatory Referral to Physical Therapy          PLAN  Reviewed x-ray and discussed with patient and family member present.  Staples removed and Steri-Strips applied.  Steri-Strip education provided.  Surgical incision care education provided.  Educated on signs and symptoms of infection.  Instructed to follow-up with physical therapy.  Begin gentle PROM only with shoulder, fingers, hand, and wrist motion.   No resistance, force or strengthening of elbow until after the 6 week follow-up and x-ray has been completed.  May remove the splint for showers, home exercises provided by PT and PT for short periods of time.  Follow-up in 3 weeks on a day Dr. Davenport is available for repeat x-ray of right elbow.    All questions and concerns are addressed with understanding noted. They are aware and are in agreement to this plan.    Return in about 3 weeks (around 7/18/2022) for Recheck.    LM Ni     This  document has been electronically signed by LM Ni on June 28, 2022 12:31 CDT      EMR Dragon/Transciption Disclaimer: Some of this note may be an electronic transcription/translation of spoken language to printed text using the Dragon Dictation System.

## 2022-07-12 DIAGNOSIS — M54.9 MID BACK PAIN: ICD-10-CM

## 2022-07-12 RX ORDER — HYDROCODONE BITARTRATE AND ACETAMINOPHEN 10; 325 MG/1; MG/1
1 TABLET ORAL EVERY 6 HOURS PRN
Qty: 120 TABLET | Refills: 0 | Status: SHIPPED | OUTPATIENT
Start: 2022-07-12 | End: 2022-08-10 | Stop reason: SDUPTHER

## 2022-07-20 DIAGNOSIS — M25.521 RIGHT ELBOW PAIN: Primary | ICD-10-CM

## 2022-07-27 ENCOUNTER — OFFICE VISIT (OUTPATIENT)
Dept: ORTHOPEDIC SURGERY | Facility: CLINIC | Age: 62
End: 2022-07-27

## 2022-07-27 VITALS — WEIGHT: 224 LBS | BODY MASS INDEX: 43.98 KG/M2 | HEIGHT: 60 IN
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
## 2022-07-27 DIAGNOSIS — Z87.81 S/P ORIF (OPEN REDUCTION INTERNAL FIXATION) FRACTURE: ICD-10-CM

## 2022-07-27 DIAGNOSIS — M25.521 RIGHT ELBOW PAIN: Primary | ICD-10-CM

## 2022-07-27 DIAGNOSIS — S52.101D CLOSED FRACTURE OF PROXIMAL END OF RIGHT RADIUS WITH ROUTINE HEALING, UNSPECIFIED FRACTURE MORPHOLOGY, SUBSEQUENT ENCOUNTER: ICD-10-CM

## 2022-07-27 DIAGNOSIS — S52.021D CLOSED FRACTURE OF OLECRANON PROCESS OF RIGHT ULNA WITH ROUTINE HEALING, SUBSEQUENT ENCOUNTER: ICD-10-CM

## 2022-07-27 DIAGNOSIS — Z98.890 S/P ORIF (OPEN REDUCTION INTERNAL FIXATION) FRACTURE: ICD-10-CM
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
## 2022-07-27 PROCEDURE — 99024 POSTOP FOLLOW-UP VISIT: CPT | Performed by: NURSE PRACTITIONER

## 2022-07-27 NOTE — PROGRESS NOTES
Prema Curtis is a 62 y.o. female is s/p     06/10/22 (6w 5d) Cisco Davenport MD    Right Elbow Open Reduction Internal Fixation - Right     Chief Complaint   Patient presents with   • Right Elbow - Follow-up     Postop- ORIF     HISTORY OF PRESENT ILLNESS: Patient presents to office accompanied by her niece for postoperative follow-up status post ORIF of right olecranon fracture performed with Dr. Davenport on 6/10/2022.  Patient is doing well postoperatively and reports that her right elbow pain has continued to progressively improve.  Patient sustained an initial injury on 5/16/2022 when she was leaving her doctor's office and she sustained a mechanical fall from a standing height while walking.  X-rays performed at that time showed evidence of a nondisplaced right radial head fracture.  Patient established care with Dr. Davenport on 5/19/2022 and was placed in a sling at that time for immobilization.  Patient was seen in the [place] [facility] for follow-up with orthopedics on 6/8/2022 and her repeat x-ray showed evidence of a displaced olecranon fracture that was not visible or noted on prior x-rays.  Overall, patient states she is doing well.  She is currently doing physical therapy at [facility].  She continues to have issues with extension but overall states that her range of motion is improving.  Surgical incision is well-healed.  She is currently wearing a sling to the right arm for immobilization.  No new complaints or concerns noted since last office visit.  No new falls or injuries reported.  Patient takes Norco 10 mg 4 times daily for control of her chronic pain as prescribed by her primary care physician.  X-rays are repeated in office today.  Pain scale currently is 0/10.    Allergies   Allergen Reactions   • Apple Other (See Comments)     Alpha gal   • Beef-Derived Products Other (See Comments)     Alpha gal   • Milk-Related Compounds Other (See Comments)     Alpha gal   • Onion Other (See  "Comments)     Alpha gal   • Penicillins Other (See Comments)     \"dont know\"   • Pork-Derived Products Other (See Comments)     Alpha gal   • Ragwitek [Short Ragweed Pollen Ext] Other (See Comments)     Nasal congestion   • Shellfish-Derived Products Other (See Comments)     Alpha gal   • Strawberry Extract Other (See Comments)     Alpha gal   • Sulfa Antibiotics Other (See Comments)     \"dont know\"   • Tomato Other (See Comments)     Alpha gal   • Latex Rash         Current Outpatient Medications:   •  albuterol sulfate  (90 Base) MCG/ACT inhaler, Inhale 2 puffs Every 4 (Four) Hours As Needed for Wheezing., Disp: , Rfl:   •  azelastine (ASTELIN) 0.1 % nasal spray, 2 sprays 2 (Two) Times a Day., Disp: , Rfl: 11  •  Easy Touch Lancets 28G/Twist misc, Use as directed at least once daily and prn, Disp: 100 each, Rfl: 5  •  empagliflozin (JARDIANCE) 25 MG tablet tablet, Take 25 mg by mouth Daily., Disp: , Rfl:   •  glucose blood (Contour Next Test) test strip, Test FSBS TID and prn due to labile blood sugars, Disp: 100 each, Rfl: 5  •  HYDROcodone-acetaminophen (Norco)  MG per tablet, Take 1 tablet by mouth Every 6 (Six) Hours As Needed for Moderate Pain ., Disp: 120 tablet, Rfl: 0  •  ibuprofen (ADVIL,MOTRIN) 800 MG tablet, Take 1 tablet by mouth 3 (Three) Times a Day As Needed for Mild Pain ., Disp: 90 tablet, Rfl: 3  •  levothyroxine (SYNTHROID, LEVOTHROID) 25 MCG tablet, Take 25 mcg by mouth Daily., Disp: , Rfl:   •  lisinopril (PRINIVIL,ZESTRIL) 10 MG tablet, Take 1 tablet by mouth Daily., Disp: 30 tablet, Rfl: 5  •  montelukast (SINGULAIR) 10 MG tablet, Take 10 mg by mouth Every Night., Disp: , Rfl:   •  mupirocin (Bactroban) 2 % ointment, Apply  topically to the appropriate area as directed 3 (Three) Times a Day. (Patient taking differently: Apply 1 application topically to the appropriate area as directed 3 (Three) Times a Day.), Disp: 30 g, Rfl: 5  •  nystatin (MYCOSTATIN) 227993 UNIT/GM powder, " Apply 1 application topically to the appropriate area as directed 4 (Four) Times a Day., Disp: , Rfl:   •  promethazine (PHENERGAN) 25 MG tablet, Take 1 tablet by mouth Every 6 (Six) Hours As Needed for Nausea or Vomiting., Disp: 30 tablet, Rfl: 2  •  rosuvastatin (CRESTOR) 5 MG tablet, Take 5 mg by mouth Daily., Disp: , Rfl:   •  triamcinolone (KENALOG) 0.1 % cream, Apply  topically to the appropriate area as directed 2 (Two) Times a Day., Disp: 80 g, Rfl: 5    No fevers or chills.  No nausea or vomiting.      PHYSICAL EXAMINATION:       Prema Curtis is a 62 y.o. female    Patient is awake and alert, answers questions appropriately and is in no apparent distress.    GAIT:     [x]  Normal  []  Antalgic    Assistive device: [x]  None  []  Walker     []  Crutches  []  Cane     []  Wheelchair  []  Stretcher    Right Elbow Exam     Tenderness   The patient is experiencing no tenderness.     Range of Motion   Extension: 20   Right elbow flexion: 125.   Pronation: abnormal   Supination: abnormal     Muscle Strength   Right elbow normal strength: deferred.    Other   Erythema: absent  Sensation: normal  Pulse: present    Comments:  Mild pain/discomfort and limitations with range of motion, primarily extension.  She only has approximately 20 degrees of extension.  She has much better flexion to about 125 degrees.  Mild/minimal generalized swelling but overall swelling is very well controlled.  No deformity.  Surgical incision is well-healed and well approximated with no erythema and no drainage.  No signs of infection noted.  Neurovascularly intact.            XR Elbow 3+ View Right    Result Date: 7/28/2022  Narrative: PROCEDURE: XR ELBOW 3+ VW RIGHT VIEWS: 3 INDICATION: Fracture COMPARISON: 6/27/2022 FINDINGS:   - fracture: olecranon fracture with plate and screw fixation. Transversely oriented radial head fracture also present. There appears to be slight interval increase in callus at the radial fracture. No  evidence of hardware failure or loosening The fracture itself is less conspicuous than on the previous study  - alignment: Unchanged   - misc: No significant soft tissue abnormality identified     Impression: Healing radial fracture and olecranon fracture with intact hardware. Fracture with Electronically signed by:  Lynn Rivera MD  7/28/2022 1:25 PM CDT Workstation: [id number]        ASSESSMENT:    Diagnoses and all orders for this visit:    Right elbow pain    S/P ORIF (open reduction internal fixation) fracture    Closed fracture of proximal end of right radius with routine healing, unspecified fracture morphology, subsequent encounter    Closed fracture of olecranon process of right ulna with routine healing, subsequent encounter    Injury due to fall, subsequent encounter    PLAN    X-rays of the right elbow performed in office today and reviewed.  The previously noted minimally displaced radial head/neck fracture remains stable and well aligned with good evidence of progressive bony healing.  The olecranon fracture and surgical hardware remains stable and well aligned with good evidence of progressive bony healing noted.  The patient is doing well postoperatively and currently denies any right elbow pain.  The patient is no longer wearing a splint and is noted to have a sling in place to her right arm today in office.  She is now 6 weeks and 5 days postop.  She continues with her course of physical therapy at [facility] and she tells me today that they need updated PT orders.  Patient continues to have limitations in range of motion, primarily with extension.  She only has about 20 degrees of extension so hopefully this will continue to improve with physical therapy.  She also has limitations with supination and pronation.  She has overall good flexion of her elbow.  The surgical incision is well-healed and well approximated with no signs of infection noted.  Recommend to continue with her current  "course of physical therapy as well as her home exercises as instructed.  I have relayed to the patient and her niece today that I will fax new/updated PT orders to [facility] today prior to her scheduled appointment this afternoon.  Since she is now over 6 weeks postop, they can begin some gentle strengthening exercises and progress as the patient tolerates it.  They also need to continue with range of motion exercises and progress as tolerated.  We also discussed that she can transition out of her sling over the next couple of weeks.  I have encouraged her to continue wearing the sling during activity and when she is out in public, primarily for protection and less for immobilization.  However, we discussed that in a controlled environment, such as when she is at home, she can remove the sling and start using her right arm for simple daily activities but have encouraged her to avoid \"overdoing it\" and to avoid any activities that cause pain and certainly exertional use or heavy lifting for now.  She is instructed to start using her right arm/elbow for nonweightbearing daily activities of living, which we discussed will also help to improve her range of motion.    The patient already takes Norco 10 mg 4 times daily as needed for control of her chronic pain as prescribed by her primary care physician.  She currently denies any right elbow pain.  Continue with use of ice therapy as needed to control swelling and minimize pain/inflammation.    Follow-up in 4 weeks for recheck with repeat x-rays at that time.    EMR Dragon/Transciption Disclaimer: Some of this note may be an electronic transcription/translation of spoken language to printed text using the Dragon Dictation System.     Return in about 4 weeks (around 8/24/2022) for Recheck.        This document has been electronically signed by LM Jesus on July 28, 2022 14:13 CDT      LM Jesus      "

## 2022-08-10 DIAGNOSIS — M54.9 MID BACK PAIN: ICD-10-CM

## 2022-08-10 RX ORDER — HYDROCODONE BITARTRATE AND ACETAMINOPHEN 10; 325 MG/1; MG/1
1 TABLET ORAL EVERY 6 HOURS PRN
Qty: 120 TABLET | Refills: 0 | Status: SHIPPED | OUTPATIENT
Start: 2022-08-10 | End: 2022-09-08 | Stop reason: SDUPTHER

## 2022-08-16 ENCOUNTER — OFFICE VISIT (OUTPATIENT)
Dept: FAMILY MEDICINE CLINIC | Facility: CLINIC | Age: 62
End: 2022-08-16

## 2022-08-16 VITALS
SYSTOLIC BLOOD PRESSURE: 132 MMHG | HEIGHT: 60 IN | WEIGHT: 225 LBS | DIASTOLIC BLOOD PRESSURE: 84 MMHG | HEART RATE: 89 BPM | TEMPERATURE: 97.3 F | OXYGEN SATURATION: 99 % | BODY MASS INDEX: 44.17 KG/M2 | RESPIRATION RATE: 16 BRPM

## 2022-08-16 DIAGNOSIS — E11.8 TYPE 2 DIABETES MELLITUS WITH COMPLICATION, WITHOUT LONG-TERM CURRENT USE OF INSULIN: Primary | ICD-10-CM

## 2022-08-16 DIAGNOSIS — S52.101D CLOSED FRACTURE OF PROXIMAL END OF RIGHT RADIUS WITH ROUTINE HEALING, UNSPECIFIED FRACTURE MORPHOLOGY, SUBSEQUENT ENCOUNTER: ICD-10-CM

## 2022-08-16 DIAGNOSIS — E66.01 MORBID OBESITY, UNSPECIFIED OBESITY TYPE: ICD-10-CM

## 2022-08-16 DIAGNOSIS — E11.42 DIABETIC PERIPHERAL NEUROPATHY: ICD-10-CM

## 2022-08-16 DIAGNOSIS — M54.9 MID BACK PAIN: ICD-10-CM

## 2022-08-16 PROCEDURE — 99214 OFFICE O/P EST MOD 30 MIN: CPT | Performed by: FAMILY MEDICINE

## 2022-08-16 RX ORDER — SYRING-NEEDL,DISP,INSUL,0.3 ML 30 GX5/16"
SYRINGE, EMPTY DISPOSABLE MISCELLANEOUS
Qty: 100 EACH | Refills: 6 | Status: SHIPPED | OUTPATIENT
Start: 2022-08-16

## 2022-08-16 RX ORDER — BLOOD-GLUCOSE METER
1 KIT MISCELLANEOUS AS NEEDED
Qty: 1 EACH | Refills: 0 | Status: SHIPPED | OUTPATIENT
Start: 2022-08-16 | End: 2023-02-23 | Stop reason: SDUPTHER

## 2022-08-16 NOTE — PROGRESS NOTES
"Subjective   Prema Curtis is a 62 y.o. female.  Patient with type 2 diabetes with peripheral neuropathy, chronic low back and knee pain.  She is here for follow-up, refills of medications.  She is on hydrocodone for pain.  She lives with her brother who does help administer her medications.  She recently fractured her right distal radius and has had surgery.  She is still not up to 100% use.  She is in physical therapy which had to be delayed because she got COVID after the surgery evidently.    Blood sugars are doing well and she is still trying to be careful watching her diet.    History of Present Illness   The following portions of the patient's history were reviewed and updated as appropriate: allergies, current medications, past family history, past medical history, past social history, past surgical history and problem list.    Review of Systems   Constitutional: Negative.    HENT: Negative for postnasal drip and sneezing.    Cardiovascular: Negative.    Gastrointestinal: Negative.    Musculoskeletal: Positive for arthralgias.   Allergic/Immunologic: Positive for food allergies. Negative for immunocompromised state.   Neurological: Negative for syncope.   Hematological: Negative.    Psychiatric/Behavioral: Negative for agitation, dysphoric mood and sleep disturbance.   All other systems reviewed and are negative.    Objective    Vitals:    08/16/22 1251   BP: 132/84   BP Location: Left arm   Patient Position: Sitting   Cuff Size: Adult   Pulse: 89   Resp: 16   Temp: 97.3 °F (36.3 °C)   SpO2: 99%   Weight: 102 kg (225 lb)   Height: 152.4 cm (60\")     Body mass index is 43.94 kg/m².    Physical Exam   Constitutional: She is oriented to person, place, and time. She appears well-developed.   HENT:   Head: Normocephalic and atraumatic.   Nose: Nose normal.   Eyes: Pupils are equal, round, and reactive to light. Conjunctivae are normal.   Neck: No JVD present. No tracheal deviation present. No thyromegaly " present.   Cardiovascular: Normal rate, regular rhythm and normal heart sounds.   No murmur heard.  Pulmonary/Chest: Effort normal and breath sounds normal. She has no wheezes.   Abdominal: Soft. Bowel sounds are normal. She exhibits no distension. There is no abdominal tenderness.   Musculoskeletal: Tenderness present.      Comments: Tender over the right elbow   Lymphadenopathy:     She has no cervical adenopathy.   Neurological: She is alert and oriented to person, place, and time. She displays normal reflexes. Coordination abnormal.   Skin: Skin is warm and dry. No rash noted.   Left fifth toenail shows an avulsion of lateral half of the nail, callus on the lateral left toe.  Overall the foot is slightly deformed with the fifth toe rolled inward   Nursing note and vitals reviewed.        Assessment & Plan   Diagnoses and all orders for this visit:    1. Type 2 diabetes mellitus with complication, without long-term current use of insulin (HCC) (Primary)  -     glucose monitor monitoring kit; 1 each As Needed (3 tid).  Dispense: 1 each; Refill: 0  -     Lancet Device misc; Test three times a day  Dispense: 100 each; Refill: 6  -     glucose blood test strip; Use as instructed  Dispense: 100 each; Refill: 6    2. Diabetic peripheral neuropathy (HCC)    3. Morbid obesity, unspecified obesity type (HCC)    4. Mid back pain    5. Closed fracture of proximal end of right radius with routine healing, unspecified fracture morphology, subsequent encounter    Discussed the patient's BMI with her. BMI is above normal parameters. Follow-up plan includes:  educational material and nutrition counseling.    Continue hydrocodone for the back pain.  She has been compliant. The patient has read and signed the [facility] Controlled Substance Contract.  I will continue to see patient for regular follow up appointments. Patient is well controlled on the medication.  SHAYLA has been reviewed by me and is updated every 3 months.  The patient is aware of the potential for addiction and dependence.     Continue with current diabetes medications and testing of blood sugars at least daily and prn.  Encourage compliance with diabetic diet.     Continue hydrocodone for pain issues, she will let me know when refill is needed.    Continue also ibuprofen for inflammation and pain.    The patient has read and signed the [facility] Controlled Substance Contract.  I will continue to see patient for regular follow up appointments. Patient is well controlled on the medication.  SHAYLA has been reviewed by me and is updated every 3 months. The patient is aware of the potential for addiction and dependence.     Follow-up with orthopedics regarding radius fracture, continuing PT        This document has been electronically signed by Olga Rojas MD on August 16, 2022 13:08 CDT

## 2022-08-23 DIAGNOSIS — M25.521 RIGHT ELBOW PAIN: Primary | ICD-10-CM

## 2022-08-24 ENCOUNTER — OFFICE VISIT (OUTPATIENT)
Dept: ORTHOPEDIC SURGERY | Facility: CLINIC | Age: 62
End: 2022-08-24

## 2022-08-24 VITALS — WEIGHT: 225 LBS | BODY MASS INDEX: 44.17 KG/M2 | HEIGHT: 60 IN

## 2022-08-24 DIAGNOSIS — Z87.81 S/P ORIF (OPEN REDUCTION INTERNAL FIXATION) FRACTURE: Primary | ICD-10-CM

## 2022-08-24 DIAGNOSIS — S52.021D CLOSED FRACTURE OF OLECRANON PROCESS OF RIGHT ULNA WITH ROUTINE HEALING, SUBSEQUENT ENCOUNTER: ICD-10-CM

## 2022-08-24 DIAGNOSIS — Z98.890 S/P ORIF (OPEN REDUCTION INTERNAL FIXATION) FRACTURE: Primary | ICD-10-CM
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
## 2022-08-24 DIAGNOSIS — S52.101D CLOSED FRACTURE OF PROXIMAL END OF RIGHT RADIUS WITH ROUTINE HEALING, UNSPECIFIED FRACTURE MORPHOLOGY, SUBSEQUENT ENCOUNTER: ICD-10-CM

## 2022-08-24 PROCEDURE — 99024 POSTOP FOLLOW-UP VISIT: CPT | Performed by: NURSE PRACTITIONER

## 2022-08-24 NOTE — PROGRESS NOTES
"Prema Curtis is a 62 y.o. female is s/p       Chief Complaint   Patient presents with   • Right Elbow - Post-op, Follow-up     ORIF 6/8/2022     HISTORY OF PRESENT ILLNESS: Patient presents to office accompanied by her niece for postoperative follow-up status post ORIF of right olecranon fracture performed with Dr. Davenport on 6/10/2022. Patient sustained an initial injury on 5/16/2022 when she was leaving her doctor's office and she sustained a mechanical fall from a standing height while walking.  X-rays performed at that time showed evidence of a nondisplaced right radial head fracture.  Patient established care with Dr. Davenport on 5/19/2022 and was placed in a sling at that time for immobilization.  Patient was seen in the [facility] for follow-up with orthopedics on 6/8/2022 and her repeat x-ray showed evidence of a displaced olecranon fracture that was not visible or noted on prior x-rays.     Patient is doing well postoperatively and has continued to have progressive improvement in her right elbow pain.  She has continued with her course of physical therapy at [facility] and has continued to have progressive improvement in her range of motion and strength.  She has attended 23 visits thus far. She also has improved right  strength and improved opposition of the fingers.  She has transitioned out of her sling as we discussed and has been increasing the use of her right arm/elbow for daily activities.  Patient reports some intermittent right elbow pain that she describes as \"throbbing\" with activity and use of her right arm but overall states her pain has been progressively improving.  She is pleased with her progress and is motivated to continue with her course of physical therapy in an effort to improve her range of motion and strength is much as possible.  No new complaints or concerns noted since last office visit.  No new falls or injuries reported. Patient takes Norco 10 mg 4 times daily " "for control of her chronic pain as prescribed by her primary care physician.  X-rays are repeated in office today.     06/10/22 (10w 5d) Cisco Davenport MD   Right Elbow Open Reduction Internal Fixation - Right     Allergies   Allergen Reactions   • Apple Other (See Comments)     Alpha gal   • Beef-Derived Products Other (See Comments)     Alpha gal   • Milk-Related Compounds Other (See Comments)     Alpha gal   • Onion Other (See Comments)     Alpha gal   • Penicillins Other (See Comments)     \"dont know\"   • Pork-Derived Products Other (See Comments)     Alpha gal   • Ragwitek [Short Ragweed Pollen Ext] Other (See Comments)     Nasal congestion   • Shellfish-Derived Products Other (See Comments)     Alpha gal   • Strawberry Extract Other (See Comments)     Alpha gal   • Sulfa Antibiotics Other (See Comments)     \"dont know\"   • Tomato Other (See Comments)     Alpha gal   • Latex Rash         Current Outpatient Medications:   •  albuterol sulfate  (90 Base) MCG/ACT inhaler, Inhale 2 puffs Every 4 (Four) Hours As Needed for Wheezing., Disp: , Rfl:   •  azelastine (ASTELIN) 0.1 % nasal spray, 2 sprays 2 (Two) Times a Day., Disp: , Rfl: 11  •  Easy Touch Lancets 28G/Twist misc, Use as directed at least once daily and prn, Disp: 100 each, Rfl: 5  •  empagliflozin (JARDIANCE) 25 MG tablet tablet, Take 25 mg by mouth Daily., Disp: , Rfl:   •  glucose blood (Contour Next Test) test strip, Test FSBS TID and prn due to labile blood sugars, Disp: 100 each, Rfl: 5  •  glucose blood test strip, Use as instructed, Disp: 100 each, Rfl: 6  •  glucose monitor monitoring kit, 1 each As Needed (3 tid)., Disp: 1 each, Rfl: 0  •  HYDROcodone-acetaminophen (Norco)  MG per tablet, Take 1 tablet by mouth Every 6 (Six) Hours As Needed for Moderate Pain ., Disp: 120 tablet, Rfl: 0  •  ibuprofen (ADVIL,MOTRIN) 800 MG tablet, Take 1 tablet by mouth 3 (Three) Times a Day As Needed for Mild Pain ., Disp: 90 tablet, Rfl: " 3  •  Lancet Device misc, Test three times a day, Disp: 100 each, Rfl: 6  •  levothyroxine (SYNTHROID, LEVOTHROID) 25 MCG tablet, Take 25 mcg by mouth Daily., Disp: , Rfl:   •  lisinopril (PRINIVIL,ZESTRIL) 10 MG tablet, Take 1 tablet by mouth Daily., Disp: 30 tablet, Rfl: 5  •  montelukast (SINGULAIR) 10 MG tablet, Take 10 mg by mouth Every Night., Disp: , Rfl:   •  mupirocin (Bactroban) 2 % ointment, Apply  topically to the appropriate area as directed 3 (Three) Times a Day. (Patient taking differently: Apply 1 application topically to the appropriate area as directed 3 (Three) Times a Day.), Disp: 30 g, Rfl: 5  •  nystatin (MYCOSTATIN) 270293 UNIT/GM powder, Apply 1 application topically to the appropriate area as directed 4 (Four) Times a Day., Disp: , Rfl:   •  promethazine (PHENERGAN) 25 MG tablet, Take 1 tablet by mouth Every 6 (Six) Hours As Needed for Nausea or Vomiting., Disp: 30 tablet, Rfl: 2  •  rosuvastatin (CRESTOR) 5 MG tablet, Take 5 mg by mouth Daily., Disp: , Rfl:   •  triamcinolone (KENALOG) 0.1 % cream, Apply  topically to the appropriate area as directed 2 (Two) Times a Day., Disp: 80 g, Rfl: 5    No fevers or chills.  No nausea or vomiting.  Right elbow pain.      PHYSICAL EXAMINATION:       Prema Curtis is a 62 y.o. female    Patient is awake and alert, answers questions appropriately and is in no apparent distress.    GAIT:     [x]  Normal  []  Antalgic    Assistive device: [x]  None  []  Walker     []  Crutches  []  Cane     []  Wheelchair  []  Stretcher    Right Elbow Exam     Tenderness   The patient is experiencing no tenderness.     Range of Motion   Right elbow extension: 15.   Right elbow flexion: 125.   Pronation: abnormal   Supination: abnormal     Muscle Strength   Pronation:  3/5   Supination:  3/5     Other   Erythema: absent  Sensation: normal  Pulse: present    Comments:  Mild pain/discomfort and limitations with range of motion, primarily extension, improved from prior  exam.  No swelling appreciated.  No deformity.  Surgical incision is well-healed and well approximated with no erythema and no drainage.  No signs of infection noted.  Neurovascularly intact.   strength is also improving and she has good opposition of all fingers, improved from prior exam.              XR Elbow 3+ View Right    Result Date: 8/27/2022  Narrative: EXAM DESCRIPTION:  XR ELBOW 3 VIEWS CLINICAL INDICATION: pain, M25.521 Pain in right elbow COMPARISON: 7/27/2022 FINDINGS: Plate and screw hardware spans the known healing olecranon fracture. Hardware appears intact and well seated. Continued increased callus formation and transverse sclerosis about the known radial neck fracture. No new fracture. No dislocation. No evidence for elbow effusion.     Impression: 1. Intact hardware spans a healing olecranon fracture. 2. Continued healing of the known radial neck fracture. Electronically signed by:  Beau Burch MD  8/27/2022 6:40 AM CDT Workstation: [id number]        ASSESSMENT:    Diagnoses and all orders for this visit:    S/P ORIF (open reduction internal fixation) fracture    Injury due to fall, subsequent encounter    Closed fracture of olecranon process of right ulna with routine healing, subsequent encounter    Closed fracture of proximal end of right radius with routine healing, unspecified fracture morphology, subsequent encounter    PLAN    X-rays of the right elbow repeated in office today and reviewed with no acute changes or findings noted.  There is good evidence of progressive bony healing noted at the radial head/neck fracture with near complete bony consolidation noted of the fracture line.  The postsurgical hardware at the olecranon fracture appears stable and unchanged with good evidence of progressive bony healing noted at the olecranon fracture with partial bony consolidation.  Anatomic alignment remains normal and unchanged.  The patient is doing well postoperatively and has continued  "with her course of physical therapy.  The patient has continued to have progressive improvement in her range of motion and strength.  She continues to have some issues with full extension but overall she has progressively improved and she is pleased with her progress.  Patient has also experienced some progressive improvement in her  strength and has much better motion and opposition of her fingers now.  She reports some \"throbbing\" right elbow pain that occurs intermittently with certain activities and use of her right elbow/arm.  She currently rates her pain 8 but is smiling and talkative in office and appears quite comfortable.  She states that it time she has no pain at all in the right elbow.  She also has chronic pain and has been treated with opioids for this for a long period of time.  The surgical incision is well-healed with no signs of infection noted.  The patient has transitioned out of her splint and sling as directed and has been gradually increasing the use of her right arm for daily activities.  She is doing well and is not having any reported complications or issues.    Recommend to continue with her current course of physical therapy as well as home exercises as instructed.  The patient wants to return to driving soon and we discussed that she can return to driving at any point as long as she feels safe and can perform the required activities.    The patient already takes Norco 10 mg 4 times daily as needed for control of her chronic pain as prescribed by her primary care physician. She currently denies any right elbow pain.  Continue with use of ice therapy as needed to control swelling and minimize pain/inflammation.     Follow-up as needed for any new, worsening or persistent symptoms.     EMR Dragon/Transciption Disclaimer: Some of this note may be an electronic transcription/translation of spoken language to printed text using the Dragon Dictation System.     Return if symptoms worsen or " fail to improve.        This document has been electronically signed by LM Jesus on August 28, 2022 10:05 CDT      LM Jesus

## 2022-08-29 ENCOUNTER — TELEPHONE (OUTPATIENT)
Dept: ORTHOPEDIC SURGERY | Facility: CLINIC | Age: 62
End: 2022-08-29

## 2022-08-29 DIAGNOSIS — S52.101D CLOSED FRACTURE OF PROXIMAL END OF RIGHT RADIUS WITH ROUTINE HEALING, UNSPECIFIED FRACTURE MORPHOLOGY, SUBSEQUENT ENCOUNTER: ICD-10-CM

## 2022-08-29 DIAGNOSIS — Z98.890 S/P ORIF (OPEN REDUCTION INTERNAL FIXATION) FRACTURE: Primary | ICD-10-CM

## 2022-08-29 DIAGNOSIS — S52.021D CLOSED FRACTURE OF OLECRANON PROCESS OF RIGHT ULNA WITH ROUTINE HEALING, SUBSEQUENT ENCOUNTER: ICD-10-CM
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
## 2022-08-29 DIAGNOSIS — Z87.81 S/P ORIF (OPEN REDUCTION INTERNAL FIXATION) FRACTURE: Primary | ICD-10-CM

## 2022-09-08 ENCOUNTER — TELEPHONE (OUTPATIENT)
Dept: FAMILY MEDICINE CLINIC | Facility: CLINIC | Age: 62
End: 2022-09-08

## 2022-09-08 DIAGNOSIS — M54.9 MID BACK PAIN: ICD-10-CM

## 2022-09-08 RX ORDER — HYDROCODONE BITARTRATE AND ACETAMINOPHEN 10; 325 MG/1; MG/1
1 TABLET ORAL EVERY 6 HOURS PRN
Qty: 120 TABLET | Refills: 0 | Status: SHIPPED | OUTPATIENT
Start: 2022-09-08 | End: 2022-10-06 | Stop reason: SDUPTHER

## 2022-09-08 NOTE — TELEPHONE ENCOUNTER
HYDROcodone-acetaminophen (Norco)  MG per tablet [12781] (Order [id number])    [facility]

## 2022-09-08 NOTE — TELEPHONE ENCOUNTER
Rx Refill Note  Requested Prescriptions      No prescriptions requested or ordered in this encounter      Last office visit with prescribing clinician: 8/16/2022      Next office visit with prescribing clinician: 9/8/2022            Fadi Ray LPN  09/08/22, 16:17 CDT

## 2022-09-21 RX ORDER — ROSUVASTATIN CALCIUM 5 MG/1
TABLET, COATED ORAL
Qty: 30 TABLET | Refills: 5 | Status: SHIPPED | OUTPATIENT
Start: 2022-09-21

## 2022-10-06 DIAGNOSIS — M54.9 MID BACK PAIN: ICD-10-CM

## 2022-10-06 RX ORDER — HYDROCODONE BITARTRATE AND ACETAMINOPHEN 10; 325 MG/1; MG/1
1 TABLET ORAL EVERY 6 HOURS PRN
Qty: 120 TABLET | Refills: 0 | Status: SHIPPED | OUTPATIENT
Start: 2022-10-06 | End: 2022-11-03 | Stop reason: SDUPTHER

## 2022-11-03 DIAGNOSIS — M54.9 MID BACK PAIN: ICD-10-CM

## 2022-11-03 RX ORDER — HYDROCODONE BITARTRATE AND ACETAMINOPHEN 10; 325 MG/1; MG/1
1 TABLET ORAL EVERY 6 HOURS PRN
Qty: 120 TABLET | Refills: 0 | Status: SHIPPED | OUTPATIENT
Start: 2022-11-03 | End: 2022-12-05 | Stop reason: SDUPTHER

## 2022-11-07 ENCOUNTER — LAB (OUTPATIENT)
Dept: LAB | Facility: OTHER | Age: 62
End: 2022-11-07

## 2022-11-07 DIAGNOSIS — E11.8 TYPE 2 DIABETES MELLITUS WITH COMPLICATION, WITHOUT LONG-TERM CURRENT USE OF INSULIN: ICD-10-CM

## 2022-11-07 DIAGNOSIS — I10 ESSENTIAL HYPERTENSION: Primary | ICD-10-CM

## 2022-11-07 DIAGNOSIS — I10 ESSENTIAL HYPERTENSION: ICD-10-CM

## 2022-11-07 LAB
ALBUMIN SERPL-MCNC: 4.3 G/DL (ref 3.5–5)
ALBUMIN/GLOB SERPL: 1.3 G/DL (ref 1.1–1.8)
ALP SERPL-CCNC: 98 U/L (ref 38–126)
ALT SERPL W P-5'-P-CCNC: 16 U/L
ANION GAP SERPL CALCULATED.3IONS-SCNC: 7 MMOL/L (ref 5–15)
AST SERPL-CCNC: 16 U/L (ref 14–36)
BASOPHILS # BLD AUTO: 0.04 10*3/MM3 (ref 0–0.2)
BASOPHILS NFR BLD AUTO: 0.5 % (ref 0–1.5)
BILIRUB SERPL-MCNC: 0.6 MG/DL (ref 0.2–1.3)
BUN SERPL-MCNC: 16 MG/DL (ref 7–23)
BUN/CREAT SERPL: 21.6 (ref 7–25)
CALCIUM SPEC-SCNC: 9.1 MG/DL (ref 8.4–10.2)
CHLORIDE SERPL-SCNC: 108 MMOL/L (ref 101–112)
CHOLEST SERPL-MCNC: 169 MG/DL (ref 150–200)
CO2 SERPL-SCNC: 27 MMOL/L (ref 22–30)
CREAT SERPL-MCNC: 0.74 MG/DL (ref 0.52–1.04)
DEPRECATED RDW RBC AUTO: 45.3 FL (ref 37–54)
EGFRCR SERPLBLD CKD-EPI 2021: 91 ML/MIN/1.73
EOSINOPHIL # BLD AUTO: 0.29 10*3/MM3 (ref 0–0.4)
EOSINOPHIL NFR BLD AUTO: 3.8 % (ref 0.3–6.2)
ERYTHROCYTE [DISTWIDTH] IN BLOOD BY AUTOMATED COUNT: 14 % (ref 12.3–15.4)
GLOBULIN UR ELPH-MCNC: 3.2 GM/DL (ref 2.3–3.5)
GLUCOSE SERPL-MCNC: 176 MG/DL (ref 70–99)
HBA1C MFR BLD: 7.3 % (ref 4.8–5.6)
HCT VFR BLD AUTO: 44.4 % (ref 34–46.6)
HDLC SERPL-MCNC: 52 MG/DL (ref 40–59)
HGB BLD-MCNC: 13.9 G/DL (ref 12–15.9)
LDLC SERPL CALC-MCNC: 88 MG/DL
LDLC/HDLC SERPL: 1.6 {RATIO} (ref 0–3.22)
LYMPHOCYTES # BLD AUTO: 1.97 10*3/MM3 (ref 0.7–3.1)
LYMPHOCYTES NFR BLD AUTO: 26.1 % (ref 19.6–45.3)
MCH RBC QN AUTO: 28.3 PG (ref 26.6–33)
MCHC RBC AUTO-ENTMCNC: 31.3 G/DL (ref 31.5–35.7)
MCV RBC AUTO: 90.2 FL (ref 79–97)
MONOCYTES # BLD AUTO: 0.71 10*3/MM3 (ref 0.1–0.9)
MONOCYTES NFR BLD AUTO: 9.4 % (ref 5–12)
NEUTROPHILS NFR BLD AUTO: 4.53 10*3/MM3 (ref 1.7–7)
NEUTROPHILS NFR BLD AUTO: 60.2 % (ref 42.7–76)
PLATELET # BLD AUTO: 274 10*3/MM3 (ref 140–450)
PMV BLD AUTO: 10.5 FL (ref 6–12)
POTASSIUM SERPL-SCNC: 4 MMOL/L (ref 3.4–5)
PROT SERPL-MCNC: 7.5 G/DL (ref 6.3–8.6)
RBC # BLD AUTO: 4.92 10*6/MM3 (ref 3.77–5.28)
SODIUM SERPL-SCNC: 142 MMOL/L (ref 137–145)
T4 FREE SERPL-MCNC: 1.16 NG/DL (ref 0.93–1.7)
TRIGL SERPL-MCNC: 168 MG/DL
TSH SERPL DL<=0.05 MIU/L-ACNC: 3.37 UIU/ML (ref 0.27–4.2)
VLDLC SERPL-MCNC: 29 MG/DL (ref 5–40)
WBC NRBC COR # BLD: 7.54 10*3/MM3 (ref 3.4–10.8)

## 2022-11-07 PROCEDURE — 80053 COMPREHEN METABOLIC PANEL: CPT | Performed by: FAMILY MEDICINE

## 2022-11-07 PROCEDURE — 85025 COMPLETE CBC W/AUTO DIFF WBC: CPT | Performed by: FAMILY MEDICINE

## 2022-11-07 PROCEDURE — 80061 LIPID PANEL: CPT | Performed by: FAMILY MEDICINE

## 2022-11-07 PROCEDURE — 83036 HEMOGLOBIN GLYCOSYLATED A1C: CPT | Performed by: FAMILY MEDICINE

## 2022-11-07 PROCEDURE — 84439 ASSAY OF FREE THYROXINE: CPT | Performed by: FAMILY MEDICINE

## 2022-11-07 PROCEDURE — 84443 ASSAY THYROID STIM HORMONE: CPT | Performed by: FAMILY MEDICINE

## 2022-11-07 PROCEDURE — 36415 COLL VENOUS BLD VENIPUNCTURE: CPT | Performed by: FAMILY MEDICINE

## 2022-11-16 ENCOUNTER — OFFICE VISIT (OUTPATIENT)
Dept: FAMILY MEDICINE CLINIC | Facility: CLINIC | Age: 62
End: 2022-11-16

## 2022-11-16 VITALS
DIASTOLIC BLOOD PRESSURE: 78 MMHG | WEIGHT: 231.6 LBS | TEMPERATURE: 98.2 F | HEIGHT: 60 IN | HEART RATE: 86 BPM | BODY MASS INDEX: 45.47 KG/M2 | OXYGEN SATURATION: 99 % | RESPIRATION RATE: 16 BRPM | SYSTOLIC BLOOD PRESSURE: 130 MMHG

## 2022-11-16 DIAGNOSIS — I10 ESSENTIAL HYPERTENSION: ICD-10-CM

## 2022-11-16 DIAGNOSIS — E78.2 MIXED HYPERLIPIDEMIA: ICD-10-CM

## 2022-11-16 DIAGNOSIS — Z12.31 ENCOUNTER FOR SCREENING MAMMOGRAM FOR MALIGNANT NEOPLASM OF BREAST: ICD-10-CM

## 2022-11-16 DIAGNOSIS — E03.9 HYPOTHYROIDISM, UNSPECIFIED TYPE: ICD-10-CM

## 2022-11-16 DIAGNOSIS — R11.0 NAUSEA: ICD-10-CM

## 2022-11-16 DIAGNOSIS — E11.8 TYPE 2 DIABETES MELLITUS WITH COMPLICATION, WITHOUT LONG-TERM CURRENT USE OF INSULIN: Primary | ICD-10-CM

## 2022-11-16 DIAGNOSIS — Z78.0 POSTMENOPAUSAL: ICD-10-CM

## 2022-11-16 PROCEDURE — 99214 OFFICE O/P EST MOD 30 MIN: CPT | Performed by: FAMILY MEDICINE

## 2022-11-16 RX ORDER — PROMETHAZINE HYDROCHLORIDE 25 MG/1
25 TABLET ORAL EVERY 6 HOURS PRN
Qty: 30 TABLET | Refills: 2 | Status: SHIPPED | OUTPATIENT
Start: 2022-11-16

## 2022-11-16 RX ORDER — LEVOTHYROXINE SODIUM 0.03 MG/1
25 TABLET ORAL DAILY
Qty: 30 TABLET | Refills: 5 | Status: SHIPPED | OUTPATIENT
Start: 2022-11-16

## 2022-11-16 NOTE — PROGRESS NOTES
Subjective   Prema Curtis is a 62 y.o. female.  Patient with type 2 diabetes with peripheral neuropathy, chronic low back and knee pain here today for follow-up and refills of medications.  She is on hydrocodone for pain has been very compliant with medication regimen and monitoring.    She has gained a bit of weight and is disappointed about this but feels that she has made some negative diet changes and can do better    Blood sugars are doing well and she is still trying to be careful watching her diet.    Arm has healed well, she has been released by the orthopedic surgeon, completed PT.  She is very pleased with the range of motion she has now.    Had recent labs to review.  A1c has gone up just a bit at 7.3.  Lipids remain excellent.  She is on rosuvastatin for this.    Blood pressures have been staying at goal.    Diabetes  She presents for her follow-up diabetic visit. She has type 2 diabetes mellitus. Her disease course has been fluctuating. There are no hypoglycemic associated symptoms. There are no diabetic associated symptoms. There are no hypoglycemic complications. Symptoms are stable. There are no diabetic complications. Risk factors for coronary artery disease include family history, obesity, hypertension, post-menopausal, sedentary lifestyle, dyslipidemia and diabetes mellitus. Current diabetic treatment includes oral agent (monotherapy). She is compliant with treatment most of the time. Her weight is fluctuating minimally. She is following a generally healthy diet. Meal planning includes avoidance of concentrated sweets and carbohydrate counting. She has not had a previous visit with a dietitian. She rarely participates in exercise. Her home blood glucose trend is fluctuating minimally. Her breakfast blood glucose range is generally 110-130 mg/dl. Her lunch blood glucose range is generally  mg/dl. Her dinner blood glucose range is generally  mg/dl. Her bedtime blood glucose range  "is generally 110-130 mg/dl. Her overall blood glucose range is 110-130 mg/dl. An ACE inhibitor/angiotensin II receptor blocker is being taken. She does not see a podiatrist.Eye exam is current.    Hypertension   This is a chronic problem. The current episode started more than 1 year ago. The problem has been waxing and waning since onset. Associated symptoms include anxiety, headaches and malaise/fatigue. Pertinent negatives include no blurred vision, chest pain, neck pain, orthopnea, palpitations, peripheral edema, PND, shortness of breath or sweats. There are no associated agents to hypertension. Risk factors for coronary artery disease include dyslipidemia and family history. Past treatments include ACE inhibitor.  The current treatment provides moderate improvement. There are no compliance problems.      The following portions of the patient's history were reviewed and updated as appropriate: allergies, current medications, past family history, past medical history, past social history, past surgical history and problem list.    Review of Systems   Constitutional: Negative.    HENT: Negative for postnasal drip and sneezing.    Cardiovascular: Negative.    Gastrointestinal: Negative.    Musculoskeletal: Positive for arthralgias.   Allergic/Immunologic: Positive for food allergies. Negative for immunocompromised state.   Neurological: Negative for syncope.   Hematological: Negative.    Psychiatric/Behavioral: Negative for agitation, dysphoric mood and sleep disturbance.   All other systems reviewed and are negative.    Objective    Vitals:    11/16/22 1306   BP: 130/78   BP Location: Left arm   Patient Position: Sitting   Cuff Size: Adult   Pulse: 86   Resp: 16   Temp: 98.2 °F (36.8 °C)   SpO2: 99%   Weight: 105 kg (231 lb 9.6 oz)   Height: 152.4 cm (60\")     Body mass index is 45.23 kg/m².    Physical Exam   Constitutional: She is oriented to person, place, and time. She appears well-developed.   HENT:   Head: " Normocephalic and atraumatic.   Nose: Nose normal.   Eyes: Pupils are equal, round, and reactive to light. Conjunctivae are normal.   Neck: No JVD present. No tracheal deviation present. No thyromegaly present.   Cardiovascular: Normal rate, regular rhythm and normal heart sounds.   No murmur heard.  Pulmonary/Chest: Effort normal and breath sounds normal. She has no wheezes.   Abdominal: Soft. Bowel sounds are normal. She exhibits no distension. There is no abdominal tenderness.   Musculoskeletal: Tenderness present.      Comments: Tender over the right elbow   Lymphadenopathy:     She has no cervical adenopathy.   Neurological: She is alert and oriented to person, place, and time. She displays normal reflexes. Coordination abnormal.   Skin: Skin is warm and dry. No rash noted.   Psychiatric: Her behavior is normal. Mood, judgment and thought content normal.   Nursing note and vitals reviewed.    Lab on 11/07/2022   Component Date Value Ref Range Status   • Total Cholesterol 11/07/2022 169  150 - 200 mg/dL Final   • Triglycerides 11/07/2022 168 (H)  <=150 mg/dL Final   • HDL Cholesterol 11/07/2022 52  40 - 59 mg/dL Final   • LDL Cholesterol  11/07/2022 88  <=100 mg/dL Final   • VLDL Cholesterol 11/07/2022 29  5 - 40 mg/dL Final   • LDL/HDL Ratio 11/07/2022 1.60  0.00 - 3.22 Final   • Glucose 11/07/2022 176 (H)  70 - 99 mg/dL Final   • BUN 11/07/2022 16  7 - 23 mg/dL Final   • Creatinine 11/07/2022 0.74  0.52 - 1.04 mg/dL Final   • Sodium 11/07/2022 142  137 - 145 mmol/L Final   • Potassium 11/07/2022 4.0  3.4 - 5.0 mmol/L Final   • Chloride 11/07/2022 108  101 - 112 mmol/L Final   • CO2 11/07/2022 27.0  22.0 - 30.0 mmol/L Final   • Calcium 11/07/2022 9.1  8.4 - 10.2 mg/dL Final   • Total Protein 11/07/2022 7.5  6.3 - 8.6 g/dL Final   • Albumin 11/07/2022 4.30  3.50 - 5.00 g/dL Final   • ALT (SGPT) 11/07/2022 16  <=35 U/L Final   • AST (SGOT) 11/07/2022 16  14 - 36 U/L Final   • Alkaline Phosphatase 11/07/2022 98   38 - 126 U/L Final   • Total Bilirubin 11/07/2022 0.6  0.2 - 1.3 mg/dL Final   • Globulin 11/07/2022 3.2  2.3 - 3.5 gm/dL Final   • A/G Ratio 11/07/2022 1.3  1.1 - 1.8 g/dL Final   • BUN/Creatinine Ratio 11/07/2022 21.6  7.0 - 25.0 Final   • Anion Gap 11/07/2022 7.0  5.0 - 15.0 mmol/L Final   • eGFR 11/07/2022 91.0  >60.0 mL/min/1.73 Final    National Kidney Foundation and American Society of Nephrology (ASN) Task Force recommended calculation based on the Chronic Kidney Disease Epidemiology Collaboration (CKD-EPI) equation refit without adjustment for race.   • Hemoglobin A1C 11/07/2022 7.30 (H)  4.80 - 5.60 % Final   • TSH 11/07/2022 3.370  0.270 - 4.200 uIU/mL Final   • Free T4 11/07/2022 1.16  0.93 - 1.70 ng/dL Final   • WBC 11/07/2022 7.54  3.40 - 10.80 10*3/mm3 Final   • RBC 11/07/2022 4.92  3.77 - 5.28 10*6/mm3 Final   • Hemoglobin 11/07/2022 13.9  12.0 - 15.9 g/dL Final   • Hematocrit 11/07/2022 44.4  34.0 - 46.6 % Final   • MCV 11/07/2022 90.2  79.0 - 97.0 fL Final   • MCH 11/07/2022 28.3  26.6 - 33.0 pg Final   • MCHC 11/07/2022 31.3 (L)  31.5 - 35.7 g/dL Final   • RDW 11/07/2022 14.0  12.3 - 15.4 % Final   • RDW-SD 11/07/2022 45.3  37.0 - 54.0 fl Final   • MPV 11/07/2022 10.5  6.0 - 12.0 fL Final   • Platelets 11/07/2022 274  140 - 450 10*3/mm3 Final   • Neutrophil % 11/07/2022 60.2  42.7 - 76.0 % Final   • Lymphocyte % 11/07/2022 26.1  19.6 - 45.3 % Final   • Monocyte % 11/07/2022 9.4  5.0 - 12.0 % Final   • Eosinophil % 11/07/2022 3.8  0.3 - 6.2 % Final   • Basophil % 11/07/2022 0.5  0.0 - 1.5 % Final   • Neutrophils, Absolute 11/07/2022 4.53  1.70 - 7.00 10*3/mm3 Final   • Lymphocytes, Absolute 11/07/2022 1.97  0.70 - 3.10 10*3/mm3 Final   • Monocytes, Absolute 11/07/2022 0.71  0.10 - 0.90 10*3/mm3 Final   • Eosinophils, Absolute 11/07/2022 0.29  0.00 - 0.40 10*3/mm3 Final   • Basophils, Absolute 11/07/2022 0.04  0.00 - 0.20 10*3/mm3 Final       Assessment & Plan   Diagnoses and all orders for this  visit:    1. Type 2 diabetes mellitus with complication, without long-term current use of insulin (HCC) (Primary)  -     Diabetic Supplies    2. Nausea  -     promethazine (PHENERGAN) 25 MG tablet; Take 1 tablet by mouth Every 6 (Six) Hours As Needed for Nausea or Vomiting.  Dispense: 30 tablet; Refill: 2    3. Postmenopausal  -     DEXA Bone Density Axial; Future    4. Encounter for screening mammogram for malignant neoplasm of breast  -     Mammo Screening Bilateral With CAD; Future    5. Essential hypertension    6. Hypothyroidism, unspecified type    7. Mixed hyperlipidemia    Other orders  -     levothyroxine (SYNTHROID, LEVOTHROID) 25 MCG tablet; Take 1 tablet by mouth Daily.  Dispense: 30 tablet; Refill: 5  -     empagliflozin (JARDIANCE) 25 MG tablet tablet; Take 1 tablet by mouth Daily.  Dispense: 30 tablet; Refill: 5    Discussed the patient's BMI with her. BMI is above normal parameters. Follow-up plan includes:  educational material and nutrition counseling.    Continue hydrocodone for the back pain.  She has been compliant. The patient has read and signed the [facility] Controlled Substance Contract.  I will continue to see patient for regular follow up appointments. Patient is well controlled on the medication.  SHAYLA has been reviewed by me and is updated every 3 months. The patient is aware of the potential for addiction and dependence.     Discussed A1c being elevated.  Discussed some diet changes.  Continue test blood sugars daily and when needed and follow-up in 3 months    Refilled hydrocodone to take for chronic pain issues including arthritis and joint pain.  She is very compliant with medication regimen and monitoring will reassess in 3 months.    Continue also ibuprofen for inflammation and pain, but with other health issues advised against regular daily use and take this only when needed.    Continue current dose of Synthroid    Continue on Crestor for hyperlipidemia    She is due for  mammogram and bone density which are ordered    The patient has read and signed the [facility] Controlled Substance Contract.  I will continue to see patient for regular follow up appointments. Patient is well controlled on the medication.  SHAYLA has been reviewed by me and is updated every 3 months. The patient is aware of the potential for addiction and dependence.             This document has been electronically signed by Olga Rojas MD on November 16, 2022 13:19 CST

## 2022-12-05 DIAGNOSIS — M54.9 MID BACK PAIN: ICD-10-CM

## 2022-12-05 RX ORDER — HYDROCODONE BITARTRATE AND ACETAMINOPHEN 10; 325 MG/1; MG/1
1 TABLET ORAL EVERY 6 HOURS PRN
Qty: 120 TABLET | Refills: 0 | Status: SHIPPED | OUTPATIENT
Start: 2022-12-05 | End: 2023-01-03 | Stop reason: SDUPTHER

## 2023-01-03 DIAGNOSIS — M54.9 MID BACK PAIN: ICD-10-CM

## 2023-01-03 RX ORDER — HYDROCODONE BITARTRATE AND ACETAMINOPHEN 10; 325 MG/1; MG/1
1 TABLET ORAL EVERY 6 HOURS PRN
Qty: 120 TABLET | Refills: 0 | Status: SHIPPED | OUTPATIENT
Start: 2023-01-03 | End: 2023-02-01

## 2023-01-17 DIAGNOSIS — M81.0 AGE RELATED OSTEOPOROSIS, UNSPECIFIED PATHOLOGICAL FRACTURE PRESENCE: Primary | ICD-10-CM

## 2023-01-17 RX ORDER — PHENOL 1.4 %
600 AEROSOL, SPRAY (ML) MUCOUS MEMBRANE 2 TIMES DAILY WITH MEALS
Qty: 30 TABLET | Refills: 5 | Status: SHIPPED | OUTPATIENT
Start: 2023-01-17 | End: 2023-03-24

## 2023-02-01 DIAGNOSIS — E03.9 HYPOTHYROIDISM, UNSPECIFIED: ICD-10-CM

## 2023-02-01 DIAGNOSIS — M54.9 MID BACK PAIN: ICD-10-CM

## 2023-02-01 RX ORDER — LEVOTHYROXINE SODIUM 0.03 MG/1
TABLET ORAL
Qty: 30 TABLET | Refills: 11 | OUTPATIENT
Start: 2023-02-01

## 2023-02-01 RX ORDER — HYDROCODONE BITARTRATE AND ACETAMINOPHEN 10; 325 MG/1; MG/1
TABLET ORAL
Qty: 120 TABLET | Refills: 0 | Status: SHIPPED | OUTPATIENT
Start: 2023-02-01 | End: 2023-02-28 | Stop reason: SDUPTHER

## 2023-02-16 ENCOUNTER — OFFICE VISIT (OUTPATIENT)
Dept: FAMILY MEDICINE CLINIC | Facility: CLINIC | Age: 63
End: 2023-02-16
Payer: MEDICARE

## 2023-02-16 VITALS
WEIGHT: 235 LBS | OXYGEN SATURATION: 95 % | TEMPERATURE: 98.4 F | HEART RATE: 95 BPM | HEIGHT: 60 IN | SYSTOLIC BLOOD PRESSURE: 142 MMHG | BODY MASS INDEX: 46.13 KG/M2 | DIASTOLIC BLOOD PRESSURE: 70 MMHG

## 2023-02-16 DIAGNOSIS — E11.8 TYPE 2 DIABETES MELLITUS WITH COMPLICATION, WITHOUT LONG-TERM CURRENT USE OF INSULIN: Primary | ICD-10-CM

## 2023-02-16 DIAGNOSIS — M54.9 MID BACK PAIN: ICD-10-CM

## 2023-02-16 DIAGNOSIS — E78.2 MIXED HYPERLIPIDEMIA: ICD-10-CM

## 2023-02-16 DIAGNOSIS — L30.4 INTERTRIGO: ICD-10-CM

## 2023-02-16 DIAGNOSIS — R26.81 UNSTEADY GAIT: ICD-10-CM

## 2023-02-16 DIAGNOSIS — E66.01 OBESITY, CLASS III, BMI 40-49.9 (MORBID OBESITY): ICD-10-CM

## 2023-02-16 DIAGNOSIS — I10 ESSENTIAL HYPERTENSION: ICD-10-CM

## 2023-02-16 DIAGNOSIS — M85.89 OSTEOPENIA OF MULTIPLE SITES: ICD-10-CM

## 2023-02-16 PROCEDURE — 99214 OFFICE O/P EST MOD 30 MIN: CPT | Performed by: FAMILY MEDICINE

## 2023-02-16 NOTE — PROGRESS NOTES
Subjective   Prema Curtis is a 62 y.o. female.  Patient with type 2 diabetes with peripheral neuropathy, chronic low back and knee pain here today for for 9-day medication review and follow-up.    She has gained some weight and has admitted to having some struggles with the diabetic diet but recently has gone off of white bread and pop and is drinking more water and feels that she can get better control of her blood sugars.  Would rather not add more medicines right now she has been able to successfully control the sugars with diet and weight loss and would like to have a little more time to try this.    She her bone density showed osteoporosis.  Discussed her taking calcium and vitamin D and I recommended this.    She is having some pain across her upper back and in her low back.  She is on hydrocodone which does help with the pain.    She wonders if she should be walking with a cane as she feels wobbly at times.    She needs a new glucose meter.    She has some skin irritation in the skin folds under the pannus and under the breast    Diabetes  She presents for her follow-up diabetic visit. She has type 2 diabetes mellitus. Her disease course has been fluctuating. There are no hypoglycemic associated symptoms. There are no diabetic associated symptoms. There are no hypoglycemic complications. Symptoms are stable. There are no diabetic complications. Risk factors for coronary artery disease include family history, obesity, hypertension, post-menopausal, sedentary lifestyle, dyslipidemia and diabetes mellitus. Current diabetic treatment includes oral agent (monotherapy). She is compliant with treatment most of the time. Her weight is fluctuating minimally. She is following a generally healthy diet. Meal planning includes avoidance of concentrated sweets and carbohydrate counting. She has not had a previous visit with a dietitian. She rarely participates in exercise. Her home blood glucose trend is fluctuating  minimally. Her breakfast blood glucose range is generally 110-130 mg/dl. Her lunch blood glucose range is generally  mg/dl. Her dinner blood glucose range is generally  mg/dl. Her bedtime blood glucose range is generally 110-130 mg/dl. Her overall blood glucose range is 110-130 mg/dl. An ACE inhibitor/angiotensin II receptor blocker is being taken. She does not see a podiatrist.Eye exam is current.    Hypertension   This is a chronic problem. The current episode started more than 1 year ago. The problem has been waxing and waning since onset. Associated symptoms include anxiety, headaches and malaise/fatigue. Pertinent negatives include no blurred vision, chest pain, neck pain, orthopnea, palpitations, peripheral edema, PND, shortness of breath or sweats. There are no associated agents to hypertension. Risk factors for coronary artery disease include dyslipidemia and family history. Past treatments include ACE inhibitor.  The current treatment provides moderate improvement. There are no compliance problems.      The following portions of the patient's history were reviewed and updated as appropriate: allergies, current medications, past family history, past medical history, past social history, past surgical history and problem list.    Review of Systems   Constitutional: Negative.    HENT: Negative for postnasal drip and sneezing.    Cardiovascular: Negative.    Gastrointestinal: Negative.    Musculoskeletal: Positive for arthralgias.   Allergic/Immunologic: Positive for food allergies. Negative for immunocompromised state.   Neurological: Negative for syncope.   Hematological: Negative.    Psychiatric/Behavioral: Negative for agitation, dysphoric mood and sleep disturbance.   All other systems reviewed and are negative.    Objective    Vitals:    02/16/23 1256   BP: 142/70   BP Location: Left arm   Patient Position: Sitting   Cuff Size: Large Adult   Pulse: 95   Temp: 98.4 °F (36.9 °C)   TempSrc:  "Temporal   SpO2: 95%   Weight: 107 kg (235 lb)   Height: 152.4 cm (60\")     Body mass index is 45.9 kg/m².    Physical Exam   Constitutional: She is oriented to person, place, and time. She appears well-developed.   HENT:   Head: Normocephalic and atraumatic.   Nose: Nose normal.   Eyes: Pupils are equal, round, and reactive to light. Conjunctivae are normal.   Neck: No JVD present. No tracheal deviation present. No thyromegaly present.   Cardiovascular: Normal rate, regular rhythm and normal heart sounds.   No murmur heard.  Pulmonary/Chest: Effort normal and breath sounds normal. She has no wheezes.   Abdominal: Soft. Bowel sounds are normal. She exhibits no distension. There is no abdominal tenderness.   Musculoskeletal: Tenderness present.   Lymphadenopathy:     She has no cervical adenopathy.   Neurological: She is alert and oriented to person, place, and time. She displays normal reflexes. Coordination and gait abnormal.   Skin: Skin is warm and dry. No rash noted.   Slightly discolored reddish-brown rash in the skin folds under the pannus and under the breasts   Psychiatric: Her behavior is normal. Mood, judgment and thought content normal.   Nursing note and vitals reviewed.      Assessment & Plan   Diagnoses and all orders for this visit:    1. Type 2 diabetes mellitus with complication, without long-term current use of insulin ([facility]) (Primary)  -     Hemoglobin A1c  -     Comprehensive Metabolic Panel  -     LDL Cholesterol, Direct; Future    2. Obesity, Class III, BMI 40-49.9 (morbid obesity) (HCC)    3. Intertrigo    4. Osteopenia of multiple sites    5. Mid back pain    6. Essential hypertension    7. Mixed hyperlipidemia    8. Unsteady gait    Patient's (Body mass index is 45.9 kg/m².) indicates that they are morbidly obese (BMI > 40 or > 35 with obesity - related health condition) with health related conditions that include hypertension, diabetes mellitus and dyslipidemias . Weight is unchanged. BMI is " is above average; BMI management plan is completed. We discussed portion control and increasing exercise.     Discussed diet changes specifically, encouraging weight loss as well and we will reassess in 3 months with another A1c.  Consider adding more medication if not getting diabetes under control.    Continue current medications for hypertension and continue to monitor blood pressures regularly with goal of 130/80 or less    Continue hydrocodone for pain issues.  She is very compliant with medication regimen and monitoring and will reassess in 3 months.    Continue on Crestor for hyperlipidemia    We will start calcium, vitamin D for osteopenia and monitor another bone density in 1 to 2 years    Recommended Tinactin powder, and to keep the area dry as far as the intertrigo.    The patient has read and signed the [facility] Controlled Substance Contract.  I will continue to see patient for regular follow up appointments. Patient is well controlled on the medication.  SHAYLA has been reviewed by me and is updated every 3 months. The patient is aware of the potential for addiction and dependence.     She does have a cane and certainly recommend use of this when walking if she feels unsteady.  If she starts having more falls or near falls let me know and we will intervene further        This document has been electronically signed by Olga Rojas MD on February 16, 2023 13:06 CST

## 2023-02-23 ENCOUNTER — TELEPHONE (OUTPATIENT)
Dept: FAMILY MEDICINE CLINIC | Facility: CLINIC | Age: 63
End: 2023-02-23
Payer: MEDICARE

## 2023-02-23 DIAGNOSIS — E11.8 TYPE 2 DIABETES MELLITUS WITH COMPLICATION, WITHOUT LONG-TERM CURRENT USE OF INSULIN: ICD-10-CM

## 2023-02-23 RX ORDER — BLOOD-GLUCOSE METER
1 KIT MISCELLANEOUS AS NEEDED
Qty: 1 EACH | Refills: 0 | Status: SHIPPED | OUTPATIENT
Start: 2023-02-23

## 2023-02-28 DIAGNOSIS — M54.9 MID BACK PAIN: ICD-10-CM

## 2023-02-28 RX ORDER — HYDROCODONE BITARTRATE AND ACETAMINOPHEN 10; 325 MG/1; MG/1
1 TABLET ORAL EVERY 6 HOURS PRN
Qty: 120 TABLET | Refills: 0 | Status: SHIPPED | OUTPATIENT
Start: 2023-02-28 | End: 2023-03-29 | Stop reason: SDUPTHER

## 2023-03-02 ENCOUNTER — TELEPHONE (OUTPATIENT)
Dept: FAMILY MEDICINE CLINIC | Facility: CLINIC | Age: 63
End: 2023-03-02
Payer: MEDICARE

## 2023-03-02 DIAGNOSIS — E11.8 TYPE 2 DIABETES MELLITUS WITH COMPLICATION, WITHOUT LONG-TERM CURRENT USE OF INSULIN: Primary | ICD-10-CM

## 2023-03-02 RX ORDER — LANCETS 30 GAUGE
100 EACH MISCELLANEOUS 3 TIMES DAILY
Qty: 100 EACH | Refills: 11 | Status: SHIPPED | OUTPATIENT
Start: 2023-03-02

## 2023-03-02 RX ORDER — BLOOD-GLUCOSE METER
1 EACH MISCELLANEOUS 3 TIMES DAILY
Qty: 1 KIT | Refills: 0 | Status: SHIPPED | OUTPATIENT
Start: 2023-03-02

## 2023-03-21 ENCOUNTER — TELEPHONE (OUTPATIENT)
Dept: FAMILY MEDICINE CLINIC | Facility: CLINIC | Age: 63
End: 2023-03-21
Payer: MEDICARE

## 2023-03-21 DIAGNOSIS — E11.8 TYPE 2 DIABETES MELLITUS WITH COMPLICATION, WITHOUT LONG-TERM CURRENT USE OF INSULIN: Primary | ICD-10-CM

## 2023-03-21 RX ORDER — LANCETS
EACH MISCELLANEOUS
Qty: 100 EACH | Refills: 12 | Status: SHIPPED | OUTPATIENT
Start: 2023-03-21

## 2023-03-21 RX ORDER — BLOOD-GLUCOSE METER
1 EACH MISCELLANEOUS 3 TIMES DAILY
Qty: 1 KIT | Refills: 0 | Status: SHIPPED | OUTPATIENT
Start: 2023-03-21

## 2023-03-21 NOTE — TELEPHONE ENCOUNTER
Pt called stating that insurance will not pay for glucose monitor, strips, lancets.  [facility] said that if a new script is sent in, insurance will pay.

## 2023-03-24 DIAGNOSIS — M81.0 AGE RELATED OSTEOPOROSIS, UNSPECIFIED PATHOLOGICAL FRACTURE PRESENCE: ICD-10-CM

## 2023-03-29 DIAGNOSIS — M54.9 MID BACK PAIN: ICD-10-CM

## 2023-03-29 RX ORDER — HYDROCODONE BITARTRATE AND ACETAMINOPHEN 10; 325 MG/1; MG/1
1 TABLET ORAL EVERY 6 HOURS PRN
Qty: 120 TABLET | Refills: 0 | Status: SHIPPED | OUTPATIENT
Start: 2023-03-29

## 2023-04-25 RX ORDER — ROSUVASTATIN CALCIUM 5 MG/1
TABLET, COATED ORAL
Qty: 30 TABLET | Refills: 0 | Status: SHIPPED | OUTPATIENT
Start: 2023-04-25

## 2023-04-25 NOTE — TELEPHONE ENCOUNTER
Rx Refill Note  Requested Prescriptions     Pending Prescriptions Disp Refills   • rosuvastatin (CRESTOR) 5 MG tablet [Pharmacy Med Name: rosuvastatin 5 mg tablet] 30 tablet 5     Sig: TAKE 1 TABLET BY MOUTH once daily      Last office visit with prescribing clinician: 2/16/2023   Last telemedicine visit with prescribing clinician: 5/16/2023   Next office visit with prescribing clinician: 5/16/2023     Abdelrahman Ray RN  04/25/23, 17:21 CDT

## 2023-05-01 ENCOUNTER — TELEPHONE (OUTPATIENT)
Dept: FAMILY MEDICINE CLINIC | Facility: CLINIC | Age: 63
End: 2023-05-01
Payer: MEDICARE

## 2023-05-01 DIAGNOSIS — M54.9 MID BACK PAIN: ICD-10-CM

## 2023-05-01 RX ORDER — HYDROCODONE BITARTRATE AND ACETAMINOPHEN 10; 325 MG/1; MG/1
1 TABLET ORAL EVERY 6 HOURS PRN
Qty: 120 TABLET | Refills: 0 | Status: SHIPPED | OUTPATIENT
Start: 2023-05-01

## 2023-05-10 ENCOUNTER — LAB (OUTPATIENT)
Dept: LAB | Facility: OTHER | Age: 63
End: 2023-05-10
Payer: MEDICARE

## 2023-05-10 DIAGNOSIS — E11.8 TYPE 2 DIABETES MELLITUS WITH COMPLICATION, WITHOUT LONG-TERM CURRENT USE OF INSULIN: ICD-10-CM

## 2023-05-10 LAB
ALBUMIN SERPL-MCNC: 4.1 G/DL (ref 3.5–5)
ALBUMIN/GLOB SERPL: 1.3 G/DL (ref 1.1–1.8)
ALP SERPL-CCNC: 81 U/L (ref 38–126)
ALT SERPL W P-5'-P-CCNC: 27 U/L
ANION GAP SERPL CALCULATED.3IONS-SCNC: 9 MMOL/L (ref 5–15)
ARTICHOKE IGE QN: 76 MG/DL (ref 0–100)
AST SERPL-CCNC: 19 U/L (ref 14–36)
BILIRUB SERPL-MCNC: 0.5 MG/DL (ref 0.2–1.3)
BUN SERPL-MCNC: 15 MG/DL (ref 7–23)
BUN/CREAT SERPL: 19.5 (ref 7–25)
CALCIUM SPEC-SCNC: 9.2 MG/DL (ref 8.4–10.2)
CHLORIDE SERPL-SCNC: 109 MMOL/L (ref 101–112)
CO2 SERPL-SCNC: 25 MMOL/L (ref 22–30)
CREAT SERPL-MCNC: 0.77 MG/DL (ref 0.52–1.04)
EGFRCR SERPLBLD CKD-EPI 2021: 86.3 ML/MIN/1.73
GLOBULIN UR ELPH-MCNC: 3.1 GM/DL (ref 2.3–3.5)
GLUCOSE SERPL-MCNC: 150 MG/DL (ref 70–99)
HBA1C MFR BLD: 7.3 % (ref 4.8–5.6)
POTASSIUM SERPL-SCNC: 3.9 MMOL/L (ref 3.4–5)
PROT SERPL-MCNC: 7.2 G/DL (ref 6.3–8.6)
SODIUM SERPL-SCNC: 143 MMOL/L (ref 137–145)

## 2023-05-10 PROCEDURE — 83036 HEMOGLOBIN GLYCOSYLATED A1C: CPT | Performed by: FAMILY MEDICINE

## 2023-05-10 PROCEDURE — 83721 ASSAY OF BLOOD LIPOPROTEIN: CPT | Performed by: FAMILY MEDICINE

## 2023-05-16 ENCOUNTER — OFFICE VISIT (OUTPATIENT)
Dept: FAMILY MEDICINE CLINIC | Facility: CLINIC | Age: 63
End: 2023-05-16
Payer: MEDICARE

## 2023-05-16 VITALS
HEART RATE: 96 BPM | SYSTOLIC BLOOD PRESSURE: 130 MMHG | HEIGHT: 60 IN | DIASTOLIC BLOOD PRESSURE: 76 MMHG | RESPIRATION RATE: 18 BRPM | OXYGEN SATURATION: 97 % | BODY MASS INDEX: 44.88 KG/M2 | WEIGHT: 228.6 LBS | TEMPERATURE: 97 F

## 2023-05-16 DIAGNOSIS — E78.2 MIXED HYPERLIPIDEMIA: ICD-10-CM

## 2023-05-16 DIAGNOSIS — M85.89 OSTEOPENIA OF MULTIPLE SITES: ICD-10-CM

## 2023-05-16 DIAGNOSIS — I10 ESSENTIAL HYPERTENSION: ICD-10-CM

## 2023-05-16 DIAGNOSIS — E66.01 OBESITY, CLASS III, BMI 40-49.9 (MORBID OBESITY): ICD-10-CM

## 2023-05-16 DIAGNOSIS — E11.8 TYPE 2 DIABETES MELLITUS WITH COMPLICATION, WITHOUT LONG-TERM CURRENT USE OF INSULIN: Primary | ICD-10-CM

## 2023-05-16 NOTE — PROGRESS NOTES
Subjective   Prema Curtis is a 63 y.o. female.  Patient with type 2 diabetes with peripheral neuropathy, chronic low back and osteoarthritis here today for for 9-day medication review and follow-up.    Tries to keep a close check on her blood sugar and her blood pressure.    She takes hydrocodone for chronic low back pain as she is not a good candidate for NSAIDs with other health issues.  She has been very compliant with medication regimen and monitoring.    A1c is still running around 7.3.  She feels like she has been trying to make some better food choices but we discussed some specific changes in her diet.  She has lost 7 pounds.    She recently saw her optometrist and was started on some eyedrops for increased pressure in the left eye.  It is feeling better.    Diabetes  She presents for her follow-up diabetic visit. She has type 2 diabetes mellitus. Her disease course has been fluctuating. There are no hypoglycemic associated symptoms. There are no diabetic associated symptoms. There are no hypoglycemic complications. Symptoms are stable. There are no diabetic complications. Risk factors for coronary artery disease include family history, obesity, hypertension, post-menopausal, sedentary lifestyle, dyslipidemia and diabetes mellitus. Current diabetic treatment includes oral agent (monotherapy). She is compliant with treatment most of the time. Her weight is fluctuating minimally. She is following a generally healthy diet. Meal planning includes avoidance of concentrated sweets and carbohydrate counting. She has not had a previous visit with a dietitian. She rarely participates in exercise. Her home blood glucose trend is fluctuating minimally. Her breakfast blood glucose range is generally 110-130 mg/dl. Her lunch blood glucose range is generally  mg/dl. Her dinner blood glucose range is generally  mg/dl. Her bedtime blood glucose range is generally 110-130 mg/dl. Her overall blood glucose  "range is 110-130 mg/dl. An ACE inhibitor/angiotensin II receptor blocker is being taken. She does not see a podiatrist.Eye exam is current.    Hypertension   This is a chronic problem. The current episode started more than 1 year ago. The problem has been waxing and waning since onset. Associated symptoms include anxiety, headaches and malaise/fatigue. Pertinent negatives include no blurred vision, chest pain, neck pain, orthopnea, palpitations, peripheral edema, PND, shortness of breath or sweats. There are no associated agents to hypertension. Risk factors for coronary artery disease include dyslipidemia and family history. Past treatments include ACE inhibitor.  The current treatment provides moderate improvement. There are no compliance problems.      The following portions of the patient's history were reviewed and updated as appropriate: allergies, current medications, past family history, past medical history, past social history, past surgical history and problem list.    Review of Systems   Constitutional: Negative.    HENT: Negative for postnasal drip and sneezing.    Cardiovascular: Negative.    Gastrointestinal: Negative.    Musculoskeletal: Positive for arthralgias.   Allergic/Immunologic: Positive for food allergies. Negative for immunocompromised state.   Neurological: Negative for syncope.   Hematological: Negative.    Psychiatric/Behavioral: Negative for agitation, dysphoric mood and sleep disturbance.   All other systems reviewed and are negative.    Objective    Vitals:    05/16/23 1254   BP: 130/76   Pulse: 96   Resp: 18   Temp: 97 °F (36.1 °C)   SpO2: 97%   Weight: 104 kg (228 lb 9.6 oz)   Height: 152.4 cm (60\")     Body mass index is 44.65 kg/m².    Physical Exam   Constitutional: She is oriented to person, place, and time. She appears well-developed.   HENT:   Head: Normocephalic and atraumatic.   Nose: Nose normal.   Eyes: Pupils are equal, round, and reactive to light. Conjunctivae are " normal.   Neck: No JVD present. No tracheal deviation present. No thyromegaly present.   Cardiovascular: Normal rate, regular rhythm and normal heart sounds.   No murmur heard.  Pulmonary/Chest: Effort normal and breath sounds normal. She has no wheezes.   Abdominal: Soft. Bowel sounds are normal. She exhibits no distension. There is no abdominal tenderness.   Musculoskeletal: Tenderness present.   Lymphadenopathy:     She has no cervical adenopathy.   Neurological: She is alert and oriented to person, place, and time. She displays normal reflexes. Coordination and gait abnormal.   Skin: Skin is warm and dry. No rash noted.   Psychiatric: Her behavior is normal. Mood, judgment and thought content normal.   Nursing note and vitals reviewed.    Lab on 05/10/2023   Component Date Value Ref Range Status   • LDL Cholesterol  05/10/2023 76  0 - 100 mg/dL Final   ]  Assessment & Plan   Diagnoses and all orders for this visit:    1. Type 2 diabetes mellitus with complication, without long-term current use of insulin (Primary)  -     Diabetic Supplies    2. Obesity, Class III, BMI 40-49.9 (morbid obesity)    3. Mixed hyperlipidemia    4. Essential hypertension    5. Osteopenia of multiple sites    Patient's (Body mass index is 44.65 kg/m².) indicates that they are morbidly obese (BMI > 40 or > 35 with obesity - related health condition) with health related conditions that include hypertension, diabetes mellitus and dyslipidemias . Weight is unchanged. BMI is is above average; BMI management plan is completed. We discussed portion control and increasing exercise.     Continue rosuvastatin for lipids and recheck labs prior to next visit    Continue current medications for hypertension and continue to monitor blood pressures regularly with goal of 130/80 or less    Refilled hydrocodone for chronic pain issues earlier this month in anticipation of this visit.  Reassess in 3 months.    The patient has read and signed the [facility] Controlled Substance Contract.  I will continue to see patient for regular follow up appointments. Patient is well controlled on the medication.  SHAYLA has been reviewed by me and is updated every 3 months. The patient is aware of the potential for addiction and dependence.           This document has been electronically signed by Olga Rojas MD on May 16, 2023 13:04 CDT

## 2023-05-17 ENCOUNTER — TELEPHONE (OUTPATIENT)
Dept: FAMILY MEDICINE CLINIC | Facility: CLINIC | Age: 63
End: 2023-05-17
Payer: MEDICARE

## 2023-05-17 NOTE — TELEPHONE ENCOUNTER
She called to let you know the eyedrops she takes is latanoprost 0.05%.  One drop in the left eye at bedtime

## 2023-05-30 DIAGNOSIS — M54.9 MID BACK PAIN: ICD-10-CM

## 2023-05-30 RX ORDER — HYDROCODONE BITARTRATE AND ACETAMINOPHEN 10; 325 MG/1; MG/1
1 TABLET ORAL EVERY 6 HOURS PRN
Qty: 120 TABLET | Refills: 0 | Status: SHIPPED | OUTPATIENT
Start: 2023-05-30

## 2023-07-24 ENCOUNTER — TELEPHONE (OUTPATIENT)
Dept: FAMILY MEDICINE CLINIC | Facility: CLINIC | Age: 63
End: 2023-07-24
Payer: MEDICARE

## 2023-07-24 DIAGNOSIS — E11.8 TYPE 2 DIABETES MELLITUS WITH COMPLICATION, WITHOUT LONG-TERM CURRENT USE OF INSULIN: ICD-10-CM

## 2023-07-25 DIAGNOSIS — M81.0 AGE RELATED OSTEOPOROSIS, UNSPECIFIED PATHOLOGICAL FRACTURE PRESENCE: ICD-10-CM

## 2023-07-25 RX ORDER — ROSUVASTATIN CALCIUM 5 MG/1
TABLET, COATED ORAL
Qty: 30 TABLET | Refills: 0 | Status: SHIPPED | OUTPATIENT
Start: 2023-07-25

## 2023-07-25 RX ORDER — EMPAGLIFLOZIN 25 MG/1
TABLET, FILM COATED ORAL
Qty: 30 TABLET | Refills: 5 | Status: SHIPPED | OUTPATIENT
Start: 2023-07-25

## 2023-07-25 RX ORDER — LEVOTHYROXINE SODIUM 0.03 MG/1
TABLET ORAL
Qty: 30 TABLET | Refills: 5 | Status: SHIPPED | OUTPATIENT
Start: 2023-07-25

## 2023-07-26 DIAGNOSIS — M54.9 MID BACK PAIN: ICD-10-CM

## 2023-07-26 RX ORDER — HYDROCODONE BITARTRATE AND ACETAMINOPHEN 10; 325 MG/1; MG/1
1 TABLET ORAL EVERY 6 HOURS PRN
Qty: 120 TABLET | Refills: 0 | Status: SHIPPED | OUTPATIENT
Start: 2023-07-26

## 2023-07-31 ENCOUNTER — TELEPHONE (OUTPATIENT)
Dept: FAMILY MEDICINE CLINIC | Facility: CLINIC | Age: 63
End: 2023-07-31
Payer: MEDICARE

## 2023-07-31 DIAGNOSIS — E78.2 MIXED HYPERLIPIDEMIA: Primary | ICD-10-CM

## 2023-08-07 ENCOUNTER — LAB (OUTPATIENT)
Dept: LAB | Facility: OTHER | Age: 63
End: 2023-08-07
Payer: MEDICARE

## 2023-08-07 DIAGNOSIS — I10 ESSENTIAL HYPERTENSION: ICD-10-CM

## 2023-08-07 DIAGNOSIS — I10 ESSENTIAL HYPERTENSION: Primary | ICD-10-CM

## 2023-08-07 DIAGNOSIS — E78.2 MIXED HYPERLIPIDEMIA: ICD-10-CM

## 2023-08-07 DIAGNOSIS — E03.9 HYPOTHYROIDISM, UNSPECIFIED TYPE: ICD-10-CM

## 2023-08-07 LAB
ALBUMIN SERPL-MCNC: 4.1 G/DL (ref 3.5–5)
ALBUMIN/GLOB SERPL: 1.3 G/DL (ref 1.1–1.8)
ALP SERPL-CCNC: 72 U/L (ref 38–126)
ALT SERPL W P-5'-P-CCNC: 20 U/L
ANION GAP SERPL CALCULATED.3IONS-SCNC: 4 MMOL/L (ref 5–15)
AST SERPL-CCNC: 20 U/L (ref 14–36)
BASOPHILS # BLD AUTO: 0.08 10*3/MM3 (ref 0–0.2)
BASOPHILS NFR BLD AUTO: 1.2 % (ref 0–1.5)
BILIRUB SERPL-MCNC: 0.9 MG/DL (ref 0–1.2)
BUN SERPL-MCNC: 11 MG/DL (ref 7–23)
BUN/CREAT SERPL: 13.4 (ref 7–25)
CALCIUM SPEC-SCNC: 9.2 MG/DL (ref 8.4–10.2)
CHLORIDE SERPL-SCNC: 102 MMOL/L (ref 101–112)
CHOLEST SERPL-MCNC: 159 MG/DL (ref 150–200)
CO2 SERPL-SCNC: 32 MMOL/L (ref 22–30)
CREAT SERPL-MCNC: 0.82 MG/DL (ref 0.52–1.04)
DEPRECATED RDW RBC AUTO: 46.1 FL (ref 37–54)
EGFRCR SERPLBLD CKD-EPI 2021: 80 ML/MIN/1.73
EOSINOPHIL # BLD AUTO: 0.32 10*3/MM3 (ref 0–0.4)
EOSINOPHIL NFR BLD AUTO: 4.8 % (ref 0.3–6.2)
ERYTHROCYTE [DISTWIDTH] IN BLOOD BY AUTOMATED COUNT: 14 % (ref 12.3–15.4)
GLOBULIN UR ELPH-MCNC: 3.2 GM/DL (ref 2.3–3.5)
GLUCOSE SERPL-MCNC: 145 MG/DL (ref 70–99)
HCT VFR BLD AUTO: 44 % (ref 34–46.6)
HDLC SERPL-MCNC: 39 MG/DL (ref 40–59)
HGB BLD-MCNC: 14.2 G/DL (ref 12–15.9)
LDLC SERPL CALC-MCNC: 89 MG/DL
LDLC/HDLC SERPL: 2.14 {RATIO} (ref 0–3.22)
LYMPHOCYTES # BLD AUTO: 1.9 10*3/MM3 (ref 0.7–3.1)
LYMPHOCYTES NFR BLD AUTO: 28.7 % (ref 19.6–45.3)
MCH RBC QN AUTO: 30.1 PG (ref 26.6–33)
MCHC RBC AUTO-ENTMCNC: 32.3 G/DL (ref 31.5–35.7)
MCV RBC AUTO: 93.2 FL (ref 79–97)
MONOCYTES # BLD AUTO: 0.68 10*3/MM3 (ref 0.1–0.9)
MONOCYTES NFR BLD AUTO: 10.3 % (ref 5–12)
NEUTROPHILS NFR BLD AUTO: 3.64 10*3/MM3 (ref 1.7–7)
NEUTROPHILS NFR BLD AUTO: 55 % (ref 42.7–76)
PLATELET # BLD AUTO: 245 10*3/MM3 (ref 140–450)
PMV BLD AUTO: 10.4 FL (ref 6–12)
POTASSIUM SERPL-SCNC: 4.1 MMOL/L (ref 3.4–5)
PROT SERPL-MCNC: 7.3 G/DL (ref 6.3–8.6)
RBC # BLD AUTO: 4.72 10*6/MM3 (ref 3.77–5.28)
SODIUM SERPL-SCNC: 138 MMOL/L (ref 137–145)
TRIGL SERPL-MCNC: 182 MG/DL
VLDLC SERPL-MCNC: 31 MG/DL (ref 5–40)
WBC NRBC COR # BLD: 6.62 10*3/MM3 (ref 3.4–10.8)

## 2023-08-07 PROCEDURE — 36415 COLL VENOUS BLD VENIPUNCTURE: CPT | Performed by: FAMILY MEDICINE

## 2023-08-07 PROCEDURE — 84443 ASSAY THYROID STIM HORMONE: CPT | Performed by: FAMILY MEDICINE

## 2023-08-07 PROCEDURE — 85025 COMPLETE CBC W/AUTO DIFF WBC: CPT | Performed by: FAMILY MEDICINE

## 2023-08-07 PROCEDURE — 80061 LIPID PANEL: CPT | Performed by: FAMILY MEDICINE

## 2023-08-07 PROCEDURE — 84439 ASSAY OF FREE THYROXINE: CPT | Performed by: FAMILY MEDICINE

## 2023-08-07 PROCEDURE — 80053 COMPREHEN METABOLIC PANEL: CPT | Performed by: FAMILY MEDICINE

## 2023-08-08 LAB
T4 FREE SERPL-MCNC: 1.15 NG/DL (ref 0.93–1.7)
TSH SERPL DL<=0.05 MIU/L-ACNC: 3.65 UIU/ML (ref 0.27–4.2)

## 2023-08-16 ENCOUNTER — OFFICE VISIT (OUTPATIENT)
Dept: FAMILY MEDICINE CLINIC | Facility: CLINIC | Age: 63
End: 2023-08-16
Payer: MEDICARE

## 2023-08-16 VITALS
HEART RATE: 96 BPM | DIASTOLIC BLOOD PRESSURE: 80 MMHG | SYSTOLIC BLOOD PRESSURE: 136 MMHG | WEIGHT: 228 LBS | RESPIRATION RATE: 18 BRPM | HEIGHT: 60 IN | BODY MASS INDEX: 44.76 KG/M2 | OXYGEN SATURATION: 97 %

## 2023-08-16 DIAGNOSIS — E78.2 MIXED HYPERLIPIDEMIA: ICD-10-CM

## 2023-08-16 DIAGNOSIS — I10 ESSENTIAL HYPERTENSION: ICD-10-CM

## 2023-08-16 DIAGNOSIS — E66.01 OBESITY, CLASS III, BMI 40-49.9 (MORBID OBESITY): ICD-10-CM

## 2023-08-16 DIAGNOSIS — M54.9 MID BACK PAIN: ICD-10-CM

## 2023-08-16 DIAGNOSIS — E03.9 HYPOTHYROIDISM, UNSPECIFIED TYPE: ICD-10-CM

## 2023-08-16 DIAGNOSIS — E11.8 TYPE 2 DIABETES MELLITUS WITH COMPLICATION, WITHOUT LONG-TERM CURRENT USE OF INSULIN: Primary | ICD-10-CM

## 2023-08-16 RX ORDER — HYDROCODONE BITARTRATE AND ACETAMINOPHEN 10; 325 MG/1; MG/1
1 TABLET ORAL EVERY 6 HOURS PRN
Qty: 120 TABLET | Refills: 0 | Status: SHIPPED | OUTPATIENT
Start: 2023-08-16

## 2023-08-16 NOTE — PROGRESS NOTES
Subjective   Prema Curtis is a 63 y.o. female.  Patient with type 2 diabetes with peripheral neuropathy, chronic low back and osteoarthritis here today for for 90-day medication review and follow-up.    She takes hydrocodone for pain issues as she is not a good candidate for NSAIDs.  She is very compliant with medication regimen and monitoring.    Patient monitors blood sugars closely and brought in charts from the past 3 months.  Blood sugars are averaging over 150 consistently.  Recently had an eye exam and was told there is no sign of diabetic eye disease.    She does have a callus on the right foot laterally that is causing her some pain.    Had recent labs to review and overall labs are all excellent.  Diabetes  She presents for her follow-up diabetic visit. She has type 2 diabetes mellitus. The initial diagnosis of diabetes was made 3 years ago. Her disease course has been fluctuating. There are no hypoglycemic associated symptoms. There are no diabetic associated symptoms. There are no hypoglycemic complications. Symptoms are stable. There are no diabetic complications. Risk factors for coronary artery disease include family history, obesity, hypertension, post-menopausal, sedentary lifestyle, dyslipidemia and diabetes mellitus. Current diabetic treatment includes oral agent (monotherapy). She is compliant with treatment most of the time. Her weight is fluctuating minimally. She is following a generally healthy diet. Meal planning includes avoidance of concentrated sweets and carbohydrate counting. She has not had a previous visit with a dietitian. She rarely participates in exercise. Her home blood glucose trend is fluctuating minimally. Her breakfast blood glucose range is generally 110-130 mg/dl. Her lunch blood glucose range is generally  mg/dl. Her dinner blood glucose range is generally  mg/dl. Her bedtime blood glucose range is generally 110-130 mg/dl. Her overall blood glucose range  "is 110-130 mg/dl. An ACE inhibitor/angiotensin II receptor blocker is being taken. She does not see a podiatrist.Eye exam is current.      Hypertension   This is a chronic problem. The current episode started more than 1 year ago. The problem has been waxing and waning since onset. Associated symptoms include anxiety, headaches and malaise/fatigue. Pertinent negatives include no blurred vision, chest pain, neck pain, orthopnea, palpitations, peripheral edema, PND, shortness of breath or sweats. There are no associated agents to hypertension. Risk factors for coronary artery disease include dyslipidemia and family history. Past treatments include ACE inhibitor.  The current treatment provides moderate improvement. There are no compliance problems.      The following portions of the patient's history were reviewed and updated as appropriate: allergies, current medications, past family history, past medical history, past social history, past surgical history and problem list.    Review of Systems   Constitutional: Negative.    HENT:  Negative for postnasal drip and sneezing.    Cardiovascular: Negative.    Gastrointestinal: Negative.    Musculoskeletal:  Positive for arthralgias.   Allergic/Immunologic: Positive for food allergies. Negative for immunocompromised state.   Neurological:  Negative for syncope.   Hematological: Negative.    Psychiatric/Behavioral:  Negative for agitation, dysphoric mood and sleep disturbance.    All other systems reviewed and are negative.  Objective    Vitals:    08/16/23 1304   BP: 136/80   Pulse: 96   Resp: 18   SpO2: 97%   Weight: 103 kg (228 lb)   Height: 152.4 cm (60\")     Body mass index is 44.53 kg/mý.    Physical Exam   Constitutional: She is oriented to person, place, and time. She appears well-developed.   HENT:   Head: Normocephalic and atraumatic.   Nose: Nose normal.   Eyes: Pupils are equal, round, and reactive to light. Conjunctivae are normal.   Neck: No JVD present. No " tracheal deviation present. No thyromegaly present.   Cardiovascular: Normal rate, regular rhythm and normal heart sounds.   No murmur heard.  Pulmonary/Chest: Effort normal and breath sounds normal. She has no wheezes.   Abdominal: Soft. Bowel sounds are normal. She exhibits no distension. There is no abdominal tenderness.   Musculoskeletal: Tenderness present.   Lymphadenopathy:     She has no cervical adenopathy.   Neurological: She is alert and oriented to person, place, and time. She displays normal reflexes. Coordination and gait abnormal.   Skin: Skin is warm and dry. No rash noted.   Psychiatric: Her behavior is normal. Mood, judgment and thought content normal.   Nursing note and vitals reviewed.  Lab on 08/07/2023   Component Date Value Ref Range Status    Total Cholesterol 08/07/2023 159  150 - 200 mg/dL Final    Triglycerides 08/07/2023 182 (H)  <=150 mg/dL Final    HDL Cholesterol 08/07/2023 39 (L)  40 - 59 mg/dL Final    LDL Cholesterol  08/07/2023 89  <=100 mg/dL Final    VLDL Cholesterol 08/07/2023 31  5 - 40 mg/dL Final    LDL/HDL Ratio 08/07/2023 2.14  0.00 - 3.22 Final    Glucose 08/07/2023 145 (H)  70 - 99 mg/dL Final    BUN 08/07/2023 11  7 - 23 mg/dL Final    Creatinine 08/07/2023 0.82  0.52 - 1.04 mg/dL Final    Sodium 08/07/2023 138  137 - 145 mmol/L Final    Potassium 08/07/2023 4.1  3.4 - 5.0 mmol/L Final    Chloride 08/07/2023 102  101 - 112 mmol/L Final    CO2 08/07/2023 32.0 (H)  22.0 - 30.0 mmol/L Final    Calcium 08/07/2023 9.2  8.4 - 10.2 mg/dL Final    Total Protein 08/07/2023 7.3  6.3 - 8.6 g/dL Final    Albumin 08/07/2023 4.1  3.5 - 5.0 g/dL Final    ALT (SGPT) 08/07/2023 20  <=35 U/L Final    AST (SGOT) 08/07/2023 20  14 - 36 U/L Final    Alkaline Phosphatase 08/07/2023 72  38 - 126 U/L Final    Total Bilirubin 08/07/2023 0.9  0.0 - 1.2 mg/dL Final    Globulin 08/07/2023 3.2  2.3 - 3.5 gm/dL Final    A/G Ratio 08/07/2023 1.3  1.1 - 1.8 g/dL Final    BUN/Creatinine Ratio 08/07/2023  13.4  7.0 - 25.0 Final    Anion Gap 08/07/2023 4.0 (L)  5.0 - 15.0 mmol/L Final    eGFR 08/07/2023 80.0  >60.0 mL/min/1.73 Final    Free T4 08/07/2023 1.15  0.93 - 1.70 ng/dL Final    TSH 08/07/2023 3.650  0.270 - 4.200 uIU/mL Final    WBC 08/07/2023 6.62  3.40 - 10.80 10*3/mm3 Final    RBC 08/07/2023 4.72  3.77 - 5.28 10*6/mm3 Final    Hemoglobin 08/07/2023 14.2  12.0 - 15.9 g/dL Final    Hematocrit 08/07/2023 44.0  34.0 - 46.6 % Final    MCV 08/07/2023 93.2  79.0 - 97.0 fL Final    MCH 08/07/2023 30.1  26.6 - 33.0 pg Final    MCHC 08/07/2023 32.3  31.5 - 35.7 g/dL Final    RDW 08/07/2023 14.0  12.3 - 15.4 % Final    RDW-SD 08/07/2023 46.1  37.0 - 54.0 fl Final    MPV 08/07/2023 10.4  6.0 - 12.0 fL Final    Platelets 08/07/2023 245  140 - 450 10*3/mm3 Final    Neutrophil % 08/07/2023 55.0  42.7 - 76.0 % Final    Lymphocyte % 08/07/2023 28.7  19.6 - 45.3 % Final    Monocyte % 08/07/2023 10.3  5.0 - 12.0 % Final    Eosinophil % 08/07/2023 4.8  0.3 - 6.2 % Final    Basophil % 08/07/2023 1.2  0.0 - 1.5 % Final    Neutrophils, Absolute 08/07/2023 3.64  1.70 - 7.00 10*3/mm3 Final    Lymphocytes, Absolute 08/07/2023 1.90  0.70 - 3.10 10*3/mm3 Final    Monocytes, Absolute 08/07/2023 0.68  0.10 - 0.90 10*3/mm3 Final    Eosinophils, Absolute 08/07/2023 0.32  0.00 - 0.40 10*3/mm3 Final    Basophils, Absolute 08/07/2023 0.08  0.00 - 0.20 10*3/mm3 Final   ]  Assessment & Plan   Diagnoses and all orders for this visit:    1. Type 2 diabetes mellitus with complication, without long-term current use of insulin (Primary)    2. Mid back pain  -     HYDROcodone-acetaminophen (NORCO)  MG per tablet; Take 1 tablet by mouth Every 6 (Six) Hours As Needed for Moderate Pain.  Dispense: 120 tablet; Refill: 0    3. Essential hypertension    4. Mixed hyperlipidemia    5. Obesity, Class III, BMI 40-49.9 (morbid obesity)    6. Hypothyroidism, unspecified type    Patient's (Body mass index is 44.53 kg/mý.) indicates that they are morbidly  obese (BMI > 40 or > 35 with obesity - related health condition) with health related conditions that include hypertension, diabetes mellitus and dyslipidemias . Weight is unchanged. BMI is is above average; BMI management plan is completed. We discussed portion control and increasing exercise.     Continue rosuvastatin for lipids and will repeat labs in 6 months.    Continue current medications for hypertension and continue to monitor blood pressures regularly with goal of 130/80 or less    Continue with current diabetes medications and testing of blood sugars at least daily and prn.  Encourage compliance with diabetic diet.     Refilled hydrocodone for chronic pain issues including back and joint pain.  Follow-up with me in 3 months    Continue current dose of levothyroxine and recheck in 6 months    The patient has read and signed the [facility] Controlled Substance Contract.  I will continue to see patient for regular follow up appointments. Patient is well controlled on the medication.  SHAYLA has been reviewed by me and is updated every 3 months. The patient is aware of the potential for addiction and dependence.           This document has been electronically signed by Olga Rojas MD on August 16, 2023 13:39 CDT

## 2023-08-21 ENCOUNTER — TELEPHONE (OUTPATIENT)
Dept: FAMILY MEDICINE CLINIC | Facility: CLINIC | Age: 63
End: 2023-08-21
Payer: MEDICARE

## 2023-08-21 RX ORDER — ROSUVASTATIN CALCIUM 5 MG/1
5 TABLET, COATED ORAL DAILY
Qty: 30 TABLET | Refills: 5 | Status: SHIPPED | OUTPATIENT
Start: 2023-08-21

## 2023-08-21 NOTE — TELEPHONE ENCOUNTER
Incoming Refill Request      Medication requested (name and dose):   rosuvastatin (CRESTOR) 5 MG tablet     Pharmacy where request should be sent:   [facility]     Additional details provided by patient:     Best call back number:     Does the patient have less than a 3 day supply:  [] Yes  [] No    Zoraida Pearson  08/21/23, 10:43 CDT

## 2023-08-30 ENCOUNTER — TELEPHONE (OUTPATIENT)
Dept: FAMILY MEDICINE CLINIC | Facility: CLINIC | Age: 63
End: 2023-08-30
Payer: MEDICARE

## 2023-08-30 NOTE — TELEPHONE ENCOUNTER
Pt called back she talked to an RN and they told her it was just a pulled muscle but if it didn't get better go to the ER

## 2023-08-30 NOTE — TELEPHONE ENCOUNTER
Pt called stating she was having pain in her chest on Sunday that was going all the way through to her shoulder blade. She said she was still having it today she was advised to go to the ER

## 2023-09-26 DIAGNOSIS — M54.9 MID BACK PAIN: ICD-10-CM

## 2023-09-26 RX ORDER — HYDROCODONE BITARTRATE AND ACETAMINOPHEN 10; 325 MG/1; MG/1
1 TABLET ORAL EVERY 6 HOURS PRN
Qty: 120 TABLET | Refills: 0 | Status: SHIPPED | OUTPATIENT
Start: 2023-09-26

## (undated) DEVICE — BNDG ELAS CO-FLEX SLF ADHR 4IN5YD LF STRL

## (undated) DEVICE — STERILE POLYISOPRENE POWDER-FREE SURGICAL GLOVES WITH EMOLLIENT COATING: Brand: PROTEXIS

## (undated) DEVICE — PAD UNDERCAST WYTEX 4IN 4YD LF STRL

## (undated) DEVICE — TBG PENCL TELESCP MEGADYNE SMOKE EVAC 10FT

## (undated) DEVICE — DISPOSABLE TOURNIQUET CUFF SINGLE BLADDER, DUAL PORT AND QUICK CONNECT CONNECTOR: Brand: COLOR CUFF

## (undated) DEVICE — DISPOSABLE BIPOLAR CODE, 12' (3.66 M): Brand: CONMED

## (undated) DEVICE — SOL IRR NACL 0.9PCT BT 1000ML

## (undated) DEVICE — DRSNG GZ CURAD XEROFORM NONADHS 5X9IN STRL

## (undated) DEVICE — SUT VIC 0 CT1 36IN J946H

## (undated) DEVICE — SCRW LK S/TAP T8STRDRV 2.7X18MM
Type: IMPLANTABLE DEVICE | Site: ELBOW | Status: NON-FUNCTIONAL
Removed: 2022-06-10

## (undated) DEVICE — GOWN,PREVENTION PLUS,XLONG/XLARGE,STRL: Brand: MEDLINE

## (undated) DEVICE — SUT VIC 2/0 CT1 36IN

## (undated) DEVICE — CVR SURG EQUIP BND RECTG 36X28

## (undated) DEVICE — BIT DRL QC CALIB 2.8X135X45MM NS

## (undated) DEVICE — GLV SURG TRIUMPH LT PF LTX 8 STRL

## (undated) DEVICE — PROXIMATE SKIN STAPLERS (35 WIDE) CONTAINS 35 STAINLESS STEEL STAPLES (FIXED HEAD): Brand: PROXIMATE

## (undated) DEVICE — SCRW LK S/TAP T8STRDRV 2.7X22MM
Type: IMPLANTABLE DEVICE | Site: ELBOW | Status: NON-FUNCTIONAL
Removed: 2022-06-10

## (undated) DEVICE — BIT DRL QC CALIB 2X140X60MM NS

## (undated) DEVICE — 3M™ IOBAN™ 2 ANTIMICROBIAL INCISE DRAPE 6651EZ: Brand: IOBAN™ 2

## (undated) DEVICE — PK EXTRM LF 60

## (undated) DEVICE — DRAPE,U/ SHT,SPLIT,PLAS,STERIL: Brand: MEDLINE

## (undated) DEVICE — CVR FLUOROSCOPE C/ARM W/TP 36X28IN

## (undated) DEVICE — PATIENT RETURN ELECTRODE, SINGLE-USE, CONTACT QUALITY MONITORING, ADULT, WITH 9FT CORD, FOR PATIENTS WEIGING OVER 33LBS. (15KG): Brand: MEGADYNE

## (undated) DEVICE — BIT DRL QC CALIB 2.5X170X80MM NS

## (undated) DEVICE — GAUZE,SPONGE,FLUFF,6"X6.75",STRL,5/TRAY: Brand: MEDLINE

## (undated) DEVICE — GLV SURG TRIUMPH PF LTX 7 STRL